# Patient Record
Sex: FEMALE | Race: WHITE | Employment: OTHER | URBAN - METROPOLITAN AREA
[De-identification: names, ages, dates, MRNs, and addresses within clinical notes are randomized per-mention and may not be internally consistent; named-entity substitution may affect disease eponyms.]

---

## 2017-03-08 ENCOUNTER — TELEPHONE (OUTPATIENT)
Dept: CARDIOLOGY CLINIC | Age: 62
End: 2017-03-08

## 2017-03-09 ENCOUNTER — OFFICE VISIT (OUTPATIENT)
Dept: INTERNAL MEDICINE CLINIC | Age: 62
End: 2017-03-09

## 2017-03-09 VITALS
DIASTOLIC BLOOD PRESSURE: 68 MMHG | WEIGHT: 215 LBS | RESPIRATION RATE: 15 BRPM | HEART RATE: 80 BPM | BODY MASS INDEX: 42.21 KG/M2 | OXYGEN SATURATION: 96 % | HEIGHT: 60 IN | SYSTOLIC BLOOD PRESSURE: 104 MMHG

## 2017-03-09 DIAGNOSIS — J44.1 ACUTE EXACERBATION OF CHRONIC OBSTRUCTIVE PULMONARY DISEASE (COPD) (HCC): Primary | Chronic | ICD-10-CM

## 2017-03-09 DIAGNOSIS — Z76.89 ENCOUNTER TO ESTABLISH CARE WITH NEW DOCTOR: ICD-10-CM

## 2017-03-09 DIAGNOSIS — I10 ESSENTIAL HYPERTENSION: ICD-10-CM

## 2017-03-09 DIAGNOSIS — B37.2 CANDIDAL INTERTRIGO: ICD-10-CM

## 2017-03-09 DIAGNOSIS — Z13.9 SCREENING: ICD-10-CM

## 2017-03-09 DIAGNOSIS — E78.00 PURE HYPERCHOLESTEROLEMIA: ICD-10-CM

## 2017-03-09 DIAGNOSIS — I25.10 CORONARY ARTERY DISEASE INVOLVING NATIVE CORONARY ARTERY OF NATIVE HEART WITHOUT ANGINA PECTORIS: ICD-10-CM

## 2017-03-09 DIAGNOSIS — E03.9 ACQUIRED HYPOTHYROIDISM: ICD-10-CM

## 2017-03-09 DIAGNOSIS — E11.8 TYPE 2 DIABETES MELLITUS WITH COMPLICATION, WITHOUT LONG-TERM CURRENT USE OF INSULIN (HCC): ICD-10-CM

## 2017-03-09 PROBLEM — E11.9 TYPE 2 DIABETES MELLITUS (HCC): Status: ACTIVE | Noted: 2017-03-09

## 2017-03-09 PROCEDURE — 3044F HG A1C LEVEL LT 7.0%: CPT | Performed by: NURSE PRACTITIONER

## 2017-03-09 PROCEDURE — 3017F COLORECTAL CA SCREEN DOC REV: CPT | Performed by: NURSE PRACTITIONER

## 2017-03-09 PROCEDURE — 1111F DSCHRG MED/CURRENT MED MERGE: CPT | Performed by: NURSE PRACTITIONER

## 2017-03-09 PROCEDURE — 3023F SPIROM DOC REV: CPT | Performed by: NURSE PRACTITIONER

## 2017-03-09 PROCEDURE — 1036F TOBACCO NON-USER: CPT | Performed by: NURSE PRACTITIONER

## 2017-03-09 PROCEDURE — G8926 SPIRO NO PERF OR DOC: HCPCS | Performed by: NURSE PRACTITIONER

## 2017-03-09 PROCEDURE — 3014F SCREEN MAMMO DOC REV: CPT | Performed by: NURSE PRACTITIONER

## 2017-03-09 PROCEDURE — G8417 CALC BMI ABV UP PARAM F/U: HCPCS | Performed by: NURSE PRACTITIONER

## 2017-03-09 PROCEDURE — G8427 DOCREV CUR MEDS BY ELIG CLIN: HCPCS | Performed by: NURSE PRACTITIONER

## 2017-03-09 PROCEDURE — 99205 OFFICE O/P NEW HI 60 MIN: CPT | Performed by: NURSE PRACTITIONER

## 2017-03-09 PROCEDURE — G8598 ASA/ANTIPLAT THER USED: HCPCS | Performed by: NURSE PRACTITIONER

## 2017-03-09 PROCEDURE — G8484 FLU IMMUNIZE NO ADMIN: HCPCS | Performed by: NURSE PRACTITIONER

## 2017-03-09 RX ORDER — CLOTRIMAZOLE 1 %
CREAM (GRAM) TOPICAL
Qty: 60 G | Refills: 0 | Status: SHIPPED | OUTPATIENT
Start: 2017-03-09 | End: 2017-08-28

## 2017-03-09 ASSESSMENT — ENCOUNTER SYMPTOMS
WHEEZING: 1
VOMITING: 0
DIARRHEA: 0
CHEST TIGHTNESS: 0
SHORTNESS OF BREATH: 0
CONSTIPATION: 1
COUGH: 0
BACK PAIN: 0
EYE DISCHARGE: 0
NAUSEA: 0
RHINORRHEA: 0

## 2017-03-13 ENCOUNTER — TELEPHONE (OUTPATIENT)
Dept: INTERNAL MEDICINE CLINIC | Age: 62
End: 2017-03-13

## 2017-03-17 ENCOUNTER — TELEPHONE (OUTPATIENT)
Dept: INTERNAL MEDICINE CLINIC | Age: 62
End: 2017-03-17

## 2017-03-17 DIAGNOSIS — E11.8 TYPE 2 DIABETES MELLITUS WITH COMPLICATION, WITHOUT LONG-TERM CURRENT USE OF INSULIN (HCC): Primary | ICD-10-CM

## 2017-03-17 RX ORDER — LORATADINE 10 MG/1
10 CAPSULE, LIQUID FILLED ORAL DAILY PRN
Qty: 30 CAPSULE | Refills: 0 | Status: SHIPPED | OUTPATIENT
Start: 2017-03-17 | End: 2017-04-10

## 2017-03-17 RX ORDER — AMITRIPTYLINE HYDROCHLORIDE 10 MG/1
10 TABLET, FILM COATED ORAL NIGHTLY
Qty: 30 TABLET | Refills: 0 | Status: SHIPPED | OUTPATIENT
Start: 2017-03-17 | End: 2017-04-10

## 2017-03-20 ENCOUNTER — TELEPHONE (OUTPATIENT)
Dept: INTERNAL MEDICINE CLINIC | Age: 62
End: 2017-03-20

## 2017-03-20 RX ORDER — GLUCOSAMINE HCL/CHONDROITIN SU 500-400 MG
CAPSULE ORAL
Qty: 35 STRIP | Refills: 3 | Status: SHIPPED | OUTPATIENT
Start: 2017-03-20 | End: 2017-04-10

## 2017-04-10 ENCOUNTER — OFFICE VISIT (OUTPATIENT)
Dept: INTERNAL MEDICINE CLINIC | Age: 62
End: 2017-04-10

## 2017-04-10 VITALS
HEART RATE: 86 BPM | DIASTOLIC BLOOD PRESSURE: 74 MMHG | BODY MASS INDEX: 43.59 KG/M2 | RESPIRATION RATE: 20 BRPM | SYSTOLIC BLOOD PRESSURE: 115 MMHG | HEIGHT: 60 IN | WEIGHT: 222 LBS

## 2017-04-10 DIAGNOSIS — J96.01 ACUTE RESPIRATORY FAILURE WITH HYPOXIA (HCC): ICD-10-CM

## 2017-04-10 DIAGNOSIS — Z87.891 H/O TOBACCO USE, PRESENTING HAZARDS TO HEALTH: ICD-10-CM

## 2017-04-10 DIAGNOSIS — M79.671 RIGHT FOOT PAIN: ICD-10-CM

## 2017-04-10 DIAGNOSIS — I10 ESSENTIAL HYPERTENSION: ICD-10-CM

## 2017-04-10 DIAGNOSIS — Z12.31 ENCOUNTER FOR SCREENING MAMMOGRAM FOR MALIGNANT NEOPLASM OF BREAST: ICD-10-CM

## 2017-04-10 DIAGNOSIS — E78.00 PURE HYPERCHOLESTEROLEMIA: ICD-10-CM

## 2017-04-10 DIAGNOSIS — Z90.710 H/O HYSTERECTOMY FOR BENIGN DISEASE: Primary | ICD-10-CM

## 2017-04-10 DIAGNOSIS — I25.9 ISCHEMIC HEART DISEASE DUE TO CORONARY ARTERY OBSTRUCTION (HCC): ICD-10-CM

## 2017-04-10 DIAGNOSIS — Z12.39 BREAST SCREENING: ICD-10-CM

## 2017-04-10 DIAGNOSIS — I24.0 ISCHEMIC HEART DISEASE DUE TO CORONARY ARTERY OBSTRUCTION (HCC): ICD-10-CM

## 2017-04-10 DIAGNOSIS — E11.8 TYPE 2 DIABETES MELLITUS WITH COMPLICATION, WITHOUT LONG-TERM CURRENT USE OF INSULIN (HCC): ICD-10-CM

## 2017-04-10 DIAGNOSIS — E03.9 ACQUIRED HYPOTHYROIDISM: ICD-10-CM

## 2017-04-10 DIAGNOSIS — J43.1 PANLOBULAR EMPHYSEMA (HCC): ICD-10-CM

## 2017-04-10 PROCEDURE — G8598 ASA/ANTIPLAT THER USED: HCPCS | Performed by: INTERNAL MEDICINE

## 2017-04-10 PROCEDURE — G8926 SPIRO NO PERF OR DOC: HCPCS | Performed by: INTERNAL MEDICINE

## 2017-04-10 PROCEDURE — G8417 CALC BMI ABV UP PARAM F/U: HCPCS | Performed by: INTERNAL MEDICINE

## 2017-04-10 PROCEDURE — 3044F HG A1C LEVEL LT 7.0%: CPT | Performed by: INTERNAL MEDICINE

## 2017-04-10 PROCEDURE — G8427 DOCREV CUR MEDS BY ELIG CLIN: HCPCS | Performed by: INTERNAL MEDICINE

## 2017-04-10 PROCEDURE — 3014F SCREEN MAMMO DOC REV: CPT | Performed by: INTERNAL MEDICINE

## 2017-04-10 PROCEDURE — 99215 OFFICE O/P EST HI 40 MIN: CPT | Performed by: INTERNAL MEDICINE

## 2017-04-10 PROCEDURE — 3017F COLORECTAL CA SCREEN DOC REV: CPT | Performed by: INTERNAL MEDICINE

## 2017-04-10 PROCEDURE — 1036F TOBACCO NON-USER: CPT | Performed by: INTERNAL MEDICINE

## 2017-04-10 PROCEDURE — 3023F SPIROM DOC REV: CPT | Performed by: INTERNAL MEDICINE

## 2017-06-14 ENCOUNTER — TELEPHONE (OUTPATIENT)
Dept: FAMILY MEDICINE CLINIC | Age: 62
End: 2017-06-14

## 2017-07-11 ENCOUNTER — TELEPHONE (OUTPATIENT)
Dept: CARDIOLOGY CLINIC | Age: 62
End: 2017-07-11

## 2017-07-11 RX ORDER — PRASUGREL 10 MG/1
10 TABLET, FILM COATED ORAL DAILY
Qty: 30 TABLET | Refills: 1 | Status: SHIPPED | OUTPATIENT
Start: 2017-07-11 | End: 2017-12-05 | Stop reason: SDUPTHER

## 2017-07-11 RX ORDER — CARVEDILOL 3.12 MG/1
3.12 TABLET ORAL 2 TIMES DAILY WITH MEALS
Qty: 60 TABLET | Refills: 1 | Status: SHIPPED | OUTPATIENT
Start: 2017-07-11 | End: 2017-12-05 | Stop reason: SDUPTHER

## 2017-07-11 RX ORDER — NITROGLYCERIN 0.4 MG/1
0.4 TABLET SUBLINGUAL EVERY 5 MIN PRN
Qty: 25 TABLET | Refills: 1 | Status: SHIPPED | OUTPATIENT
Start: 2017-07-11 | End: 2017-12-21 | Stop reason: SDUPTHER

## 2017-08-28 ENCOUNTER — OFFICE VISIT (OUTPATIENT)
Dept: INTERNAL MEDICINE CLINIC | Age: 62
End: 2017-08-28

## 2017-08-28 VITALS
SYSTOLIC BLOOD PRESSURE: 102 MMHG | BODY MASS INDEX: 43.98 KG/M2 | HEART RATE: 79 BPM | TEMPERATURE: 99.5 F | WEIGHT: 224 LBS | DIASTOLIC BLOOD PRESSURE: 69 MMHG | HEIGHT: 60 IN | RESPIRATION RATE: 15 BRPM | OXYGEN SATURATION: 94 %

## 2017-08-28 DIAGNOSIS — E03.9 ACQUIRED HYPOTHYROIDISM: ICD-10-CM

## 2017-08-28 DIAGNOSIS — I10 ESSENTIAL HYPERTENSION: ICD-10-CM

## 2017-08-28 DIAGNOSIS — J44.1 COPD EXACERBATION (HCC): ICD-10-CM

## 2017-08-28 DIAGNOSIS — Z12.31 ENCOUNTER FOR SCREENING MAMMOGRAM FOR MALIGNANT NEOPLASM OF BREAST: ICD-10-CM

## 2017-08-28 DIAGNOSIS — Z12.39 SCREENING BREAST EXAMINATION: ICD-10-CM

## 2017-08-28 DIAGNOSIS — J43.1 PANLOBULAR EMPHYSEMA (HCC): ICD-10-CM

## 2017-08-28 DIAGNOSIS — E11.8 TYPE 2 DIABETES MELLITUS WITH COMPLICATION, WITHOUT LONG-TERM CURRENT USE OF INSULIN (HCC): ICD-10-CM

## 2017-08-28 DIAGNOSIS — Z91.199 NONADHERENCE TO MEDICAL TREATMENT: ICD-10-CM

## 2017-08-28 DIAGNOSIS — Z12.4 SCREENING FOR CERVICAL CANCER: ICD-10-CM

## 2017-08-28 DIAGNOSIS — B96.89 BACTERIAL VAGINOSIS: Primary | ICD-10-CM

## 2017-08-28 DIAGNOSIS — N76.0 BACTERIAL VAGINOSIS: Primary | ICD-10-CM

## 2017-08-28 DIAGNOSIS — I25.9 ISCHEMIC HEART DISEASE DUE TO CORONARY ARTERY OBSTRUCTION (HCC): ICD-10-CM

## 2017-08-28 DIAGNOSIS — E78.00 PURE HYPERCHOLESTEROLEMIA: ICD-10-CM

## 2017-08-28 DIAGNOSIS — F33.1 MODERATE EPISODE OF RECURRENT MAJOR DEPRESSIVE DISORDER (HCC): ICD-10-CM

## 2017-08-28 DIAGNOSIS — I24.0 ISCHEMIC HEART DISEASE DUE TO CORONARY ARTERY OBSTRUCTION (HCC): ICD-10-CM

## 2017-08-28 LAB
CREATININE URINE POCT: 15
MICROALBUMIN/CREAT 24H UR: 5 MG/G{CREAT}
MICROALBUMIN/CREAT UR-RTO: NORMAL

## 2017-08-28 PROCEDURE — 3046F HEMOGLOBIN A1C LEVEL >9.0%: CPT | Performed by: INTERNAL MEDICINE

## 2017-08-28 PROCEDURE — G8427 DOCREV CUR MEDS BY ELIG CLIN: HCPCS | Performed by: INTERNAL MEDICINE

## 2017-08-28 PROCEDURE — 3014F SCREEN MAMMO DOC REV: CPT | Performed by: INTERNAL MEDICINE

## 2017-08-28 PROCEDURE — 3023F SPIROM DOC REV: CPT | Performed by: INTERNAL MEDICINE

## 2017-08-28 PROCEDURE — G8598 ASA/ANTIPLAT THER USED: HCPCS | Performed by: INTERNAL MEDICINE

## 2017-08-28 PROCEDURE — G8417 CALC BMI ABV UP PARAM F/U: HCPCS | Performed by: INTERNAL MEDICINE

## 2017-08-28 PROCEDURE — 1036F TOBACCO NON-USER: CPT | Performed by: INTERNAL MEDICINE

## 2017-08-28 PROCEDURE — 82044 UR ALBUMIN SEMIQUANTITATIVE: CPT | Performed by: INTERNAL MEDICINE

## 2017-08-28 PROCEDURE — G8926 SPIRO NO PERF OR DOC: HCPCS | Performed by: INTERNAL MEDICINE

## 2017-08-28 PROCEDURE — 99215 OFFICE O/P EST HI 40 MIN: CPT | Performed by: INTERNAL MEDICINE

## 2017-08-28 PROCEDURE — 3017F COLORECTAL CA SCREEN DOC REV: CPT | Performed by: INTERNAL MEDICINE

## 2017-08-28 RX ORDER — IBUPROFEN 800 MG/1
800 TABLET ORAL
COMMUNITY
Start: 2017-07-18 | End: 2017-08-28

## 2017-08-28 RX ORDER — CITALOPRAM 20 MG/1
20 TABLET ORAL DAILY
Qty: 30 TABLET | Refills: 11 | Status: SHIPPED | OUTPATIENT
Start: 2017-08-28 | End: 2017-12-21

## 2017-08-28 RX ORDER — LISINOPRIL 10 MG/1
10 TABLET ORAL NIGHTLY
Qty: 30 TABLET | Refills: 11 | Status: SHIPPED | OUTPATIENT
Start: 2017-08-28 | End: 2017-12-21 | Stop reason: SDUPTHER

## 2017-08-28 RX ORDER — ALBUTEROL SULFATE 90 UG/1
2 AEROSOL, METERED RESPIRATORY (INHALATION) EVERY 6 HOURS PRN
Qty: 1 INHALER | Refills: 11 | Status: SHIPPED | OUTPATIENT
Start: 2017-08-28 | End: 2017-12-21 | Stop reason: SDUPTHER

## 2017-08-28 RX ORDER — RANITIDINE 150 MG/1
TABLET ORAL
COMMUNITY
Start: 2017-02-02 | End: 2017-08-28

## 2017-08-28 RX ORDER — METRONIDAZOLE 500 MG/1
500 TABLET ORAL 3 TIMES DAILY
Qty: 30 TABLET | Refills: 0 | Status: SHIPPED | OUTPATIENT
Start: 2017-08-28 | End: 2017-09-07

## 2017-08-28 RX ORDER — FUROSEMIDE 40 MG/1
80 TABLET ORAL 2 TIMES DAILY
Qty: 60 TABLET | Refills: 3 | Status: SHIPPED | OUTPATIENT
Start: 2017-08-28 | End: 2017-12-21 | Stop reason: SDUPTHER

## 2017-08-28 RX ORDER — LEVOTHYROXINE SODIUM 0.05 MG/1
50 TABLET ORAL DAILY
Qty: 30 TABLET | Refills: 11 | Status: SHIPPED | OUTPATIENT
Start: 2017-08-28 | End: 2017-12-21 | Stop reason: SDUPTHER

## 2017-08-28 RX ORDER — POTASSIUM CHLORIDE 20 MEQ/1
20 TABLET, EXTENDED RELEASE ORAL 2 TIMES DAILY
Qty: 60 TABLET | Refills: 11 | Status: SHIPPED | OUTPATIENT
Start: 2017-08-28 | End: 2017-12-21 | Stop reason: SDUPTHER

## 2017-08-28 RX ORDER — GABAPENTIN 600 MG/1
600 TABLET ORAL
COMMUNITY
End: 2018-03-20

## 2017-08-28 ASSESSMENT — PATIENT HEALTH QUESTIONNAIRE - PHQ9
2. FEELING DOWN, DEPRESSED OR HOPELESS: 0
1. LITTLE INTEREST OR PLEASURE IN DOING THINGS: 0
SUM OF ALL RESPONSES TO PHQ QUESTIONS 1-9: 0
SUM OF ALL RESPONSES TO PHQ9 QUESTIONS 1 & 2: 0

## 2017-12-21 ENCOUNTER — OFFICE VISIT (OUTPATIENT)
Dept: INTERNAL MEDICINE CLINIC | Age: 62
End: 2017-12-21

## 2017-12-21 VITALS
HEART RATE: 69 BPM | OXYGEN SATURATION: 95 % | SYSTOLIC BLOOD PRESSURE: 122 MMHG | HEIGHT: 60 IN | DIASTOLIC BLOOD PRESSURE: 78 MMHG | WEIGHT: 210 LBS | BODY MASS INDEX: 41.23 KG/M2 | RESPIRATION RATE: 16 BRPM

## 2017-12-21 DIAGNOSIS — G47.33 OBSTRUCTIVE SLEEP APNEA SYNDROME: ICD-10-CM

## 2017-12-21 DIAGNOSIS — F33.1 MODERATE EPISODE OF RECURRENT MAJOR DEPRESSIVE DISORDER (HCC): ICD-10-CM

## 2017-12-21 DIAGNOSIS — R60.0 LOCALIZED EDEMA: ICD-10-CM

## 2017-12-21 DIAGNOSIS — E66.01 MORBID OBESITY WITH BMI OF 40.0-44.9, ADULT (HCC): ICD-10-CM

## 2017-12-21 DIAGNOSIS — I24.0 ISCHEMIC HEART DISEASE DUE TO CORONARY ARTERY OBSTRUCTION (HCC): ICD-10-CM

## 2017-12-21 DIAGNOSIS — E11.8 TYPE 2 DIABETES MELLITUS WITH COMPLICATION, WITHOUT LONG-TERM CURRENT USE OF INSULIN (HCC): ICD-10-CM

## 2017-12-21 DIAGNOSIS — I10 ESSENTIAL HYPERTENSION: ICD-10-CM

## 2017-12-21 DIAGNOSIS — E03.9 ACQUIRED HYPOTHYROIDISM: ICD-10-CM

## 2017-12-21 DIAGNOSIS — Z72.0 TOBACCO ABUSE: ICD-10-CM

## 2017-12-21 DIAGNOSIS — Z12.4 CERVICAL CANCER SCREENING: ICD-10-CM

## 2017-12-21 DIAGNOSIS — J44.1 COPD EXACERBATION (HCC): ICD-10-CM

## 2017-12-21 DIAGNOSIS — Z12.31 ENCOUNTER FOR SCREENING MAMMOGRAM FOR BREAST CANCER: Primary | ICD-10-CM

## 2017-12-21 DIAGNOSIS — I25.9 ISCHEMIC HEART DISEASE DUE TO CORONARY ARTERY OBSTRUCTION (HCC): ICD-10-CM

## 2017-12-21 DIAGNOSIS — J43.1 PANLOBULAR EMPHYSEMA (HCC): ICD-10-CM

## 2017-12-21 DIAGNOSIS — J01.90 ACUTE NON-RECURRENT SINUSITIS, UNSPECIFIED LOCATION: ICD-10-CM

## 2017-12-21 LAB
ALBUMIN SERPL-MCNC: 4.3 G/DL (ref 3.4–5)
ANION GAP SERPL CALCULATED.3IONS-SCNC: 12 MMOL/L (ref 3–16)
BUN BLDV-MCNC: 17 MG/DL (ref 7–20)
CALCIUM SERPL-MCNC: 9.8 MG/DL (ref 8.3–10.6)
CHLORIDE BLD-SCNC: 100 MMOL/L (ref 99–110)
CO2: 28 MMOL/L (ref 21–32)
CREAT SERPL-MCNC: 0.6 MG/DL (ref 0.6–1.2)
GFR AFRICAN AMERICAN: >60
GFR NON-AFRICAN AMERICAN: >60
GLUCOSE BLD-MCNC: 89 MG/DL (ref 70–99)
PHOSPHORUS: 3.6 MG/DL (ref 2.5–4.9)
POTASSIUM SERPL-SCNC: 4.1 MMOL/L (ref 3.5–5.1)
SODIUM BLD-SCNC: 140 MMOL/L (ref 136–145)

## 2017-12-21 PROCEDURE — 3017F COLORECTAL CA SCREEN DOC REV: CPT | Performed by: INTERNAL MEDICINE

## 2017-12-21 PROCEDURE — G8484 FLU IMMUNIZE NO ADMIN: HCPCS | Performed by: INTERNAL MEDICINE

## 2017-12-21 PROCEDURE — 1036F TOBACCO NON-USER: CPT | Performed by: INTERNAL MEDICINE

## 2017-12-21 PROCEDURE — 99215 OFFICE O/P EST HI 40 MIN: CPT | Performed by: INTERNAL MEDICINE

## 2017-12-21 PROCEDURE — 3023F SPIROM DOC REV: CPT | Performed by: INTERNAL MEDICINE

## 2017-12-21 PROCEDURE — G8417 CALC BMI ABV UP PARAM F/U: HCPCS | Performed by: INTERNAL MEDICINE

## 2017-12-21 PROCEDURE — 3014F SCREEN MAMMO DOC REV: CPT | Performed by: INTERNAL MEDICINE

## 2017-12-21 PROCEDURE — 3044F HG A1C LEVEL LT 7.0%: CPT | Performed by: INTERNAL MEDICINE

## 2017-12-21 PROCEDURE — G8427 DOCREV CUR MEDS BY ELIG CLIN: HCPCS | Performed by: INTERNAL MEDICINE

## 2017-12-21 PROCEDURE — G8598 ASA/ANTIPLAT THER USED: HCPCS | Performed by: INTERNAL MEDICINE

## 2017-12-21 PROCEDURE — G8926 SPIRO NO PERF OR DOC: HCPCS | Performed by: INTERNAL MEDICINE

## 2017-12-21 RX ORDER — AZITHROMYCIN 250 MG/1
TABLET, FILM COATED ORAL
Qty: 1 PACKET | Refills: 0 | Status: SHIPPED | OUTPATIENT
Start: 2017-12-21 | End: 2017-12-28

## 2017-12-21 RX ORDER — FUROSEMIDE 40 MG/1
40 TABLET ORAL 2 TIMES DAILY
Qty: 180 TABLET | Refills: 3 | Status: SHIPPED | OUTPATIENT
Start: 2017-12-21 | End: 2018-01-05 | Stop reason: SDUPTHER

## 2017-12-21 RX ORDER — NITROGLYCERIN 0.4 MG/1
0.4 TABLET SUBLINGUAL EVERY 5 MIN PRN
Qty: 25 TABLET | Refills: 1 | Status: SHIPPED | OUTPATIENT
Start: 2017-12-21 | End: 2018-07-24 | Stop reason: SDUPTHER

## 2017-12-21 RX ORDER — POTASSIUM CHLORIDE 20 MEQ/1
20 TABLET, EXTENDED RELEASE ORAL 2 TIMES DAILY
Qty: 90 TABLET | Refills: 3 | Status: SHIPPED | OUTPATIENT
Start: 2017-12-21 | End: 2018-01-05 | Stop reason: SDUPTHER

## 2017-12-21 RX ORDER — ATORVASTATIN CALCIUM 80 MG/1
80 TABLET, FILM COATED ORAL DAILY
Qty: 90 TABLET | Refills: 3 | Status: SHIPPED | OUTPATIENT
Start: 2017-12-21 | End: 2018-01-05 | Stop reason: SDUPTHER

## 2017-12-21 RX ORDER — CITALOPRAM 20 MG/1
20 TABLET ORAL DAILY
Qty: 30 TABLET | Refills: 11 | Status: SHIPPED | OUTPATIENT
Start: 2017-12-21 | End: 2018-03-21 | Stop reason: SDUPTHER

## 2017-12-21 RX ORDER — ESCITALOPRAM OXALATE 20 MG/1
20 TABLET ORAL DAILY
Qty: 30 TABLET | Refills: 3 | Status: SHIPPED | OUTPATIENT
Start: 2017-12-21 | End: 2018-03-20

## 2017-12-21 RX ORDER — LEVOTHYROXINE SODIUM 0.05 MG/1
50 TABLET ORAL DAILY
Qty: 30 TABLET | Refills: 11 | Status: SHIPPED | OUTPATIENT
Start: 2017-12-21 | End: 2018-03-21 | Stop reason: SDUPTHER

## 2017-12-21 RX ORDER — ALBUTEROL SULFATE 90 UG/1
2 AEROSOL, METERED RESPIRATORY (INHALATION) EVERY 6 HOURS PRN
Qty: 1 INHALER | Refills: 11 | Status: SHIPPED | OUTPATIENT
Start: 2017-12-21 | End: 2018-07-05 | Stop reason: SDUPTHER

## 2017-12-21 RX ORDER — LISINOPRIL 10 MG/1
10 TABLET ORAL NIGHTLY
Qty: 30 TABLET | Refills: 11 | Status: SHIPPED | OUTPATIENT
Start: 2017-12-21 | End: 2018-01-05 | Stop reason: SDUPTHER

## 2017-12-21 NOTE — PATIENT INSTRUCTIONS
Patient Education        Learning About Chronic Bronchitis  What is chronic bronchitis? Chronic bronchitis is long-term swelling and the buildup of mucus in the airways of your lungs. The airways (bronchial tubes) get inflamed and make a lot of mucus. This can narrow or block the airways, making it hard for you to breathe. It is a form of COPD (chronic obstructive pulmonary disease). Chronic bronchitis is usually caused by smoking. But chemical fumes, dust, or air pollution also can cause it over time. What can you expect when you have chronic bronchitis? Chronic bronchitis gets worse over time. You cannot undo the damage to your lungs. Over time, you may find that:  · You get short of breath even when you do simple things like get dressed or fix a meal.  · It is hard to eat or exercise. · You lose weight and feel weaker. Over many years, the swelling and mucus from chronic bronchitis make it more likely that you will get lung infections. But there are things you can do to prevent more damage and feel better. What are the symptoms? The main symptoms of chronic bronchitis are:  · A cough that will not go away. · Mucus that comes up when you cough. · Shortness of breath that gets worse when you exercise. At times, your symptoms may suddenly flare up and get much worse. This is a called an exacerbation (say \"egg-GLENN-er-BAY-ambrosio\"). When this happens, your usual symptoms quickly get worse and stay bad. This can be dangerous. You may have to go to the hospital.  How can you keep chronic bronchitis from getting worse? Don't smoke. That is the best way to keep chronic bronchitis from getting worse. If you already smoke, it is never too late to stop. If you need help quitting, talk to your doctor about stop-smoking programs and medicines. These can increase your chances of quitting for good. You can do other things to keep chronic bronchitis from getting worse:  · Avoid bad air.  Air pollution, chemical fumes, and dust also can make chronic bronchitis worse. · Get a flu shot every year. A shot may keep the flu from turning into something more serious, like pneumonia. A flu shot also may lower your chances of having a flare-up. · Get a pneumococcal shot. A shot can prevent some of the serious complications of pneumonia. Ask your doctor how often you should get this shot. How is chronic bronchitis treated? Chronic bronchitis is treated with medicines and oxygen. You also can take steps at home to stay healthy and keep your condition from getting worse. Medicines and oxygen therapy  · You may be taking medicines such as:  ¨ Bronchodilators. These help open your airways and make breathing easier. Bronchodilators are either short-acting (work for 6 to 9 hours) or long-acting (work for 24 hours). You inhale most bronchodilators, so they start to act quickly. Always carry your quick-relief inhaler with you in case you need it while you are away from home. ¨ Corticosteroids. These reduce airway inflammation. They come in pill or inhaled form. You must take these medicines every day for them to work well. ¨ Antibiotics. These medicines are used when you have a bacterial lung infection. · Take your medicines exactly as prescribed. Call your doctor if you think you are having a problem with your medicine. · Oxygen therapy boosts the amount of oxygen in your blood and helps you breathe easier. Use the flow rate your doctor has recommended, and do not change it without talking to your doctor first.  Other care at home  · If your doctor recommends it, get more exercise. Walking is a good choice. Bit by bit, increase the amount you walk every day. Try for at least 30 minutes on most days of the week. · Learn breathing methods-such as breathing through pursed lips-to help you become less short of breath.   · If your doctor has not set you up with a pulmonary rehabilitation program, talk to him or her about whether rehab plan that best meets your needs. You may want to attend a smoking cessation program to help you quit smoking. When you choose a program, look for one that has proven success. Ask your doctor for ideas. You will greatly increase your chances of success if you take medicine as well as get counseling or join a cessation program.  Some of the changes you feel when you first quit tobacco are uncomfortable. Your body will miss the nicotine at first, and you may feel short-tempered and grumpy. You may have trouble sleeping or concentrating. Medicine can help you deal with these symptoms. You may struggle with changing your smoking habits and rituals. The last step is the tricky one: Be prepared for the smoking urge to continue for a time. This is a lot to deal with, but keep at it. You will feel better. Follow-up care is a key part of your treatment and safety. Be sure to make and go to all appointments, and call your doctor if you are having problems. It's also a good idea to know your test results and keep a list of the medicines you take. How can you care for yourself at home? · Ask your family, friends, and coworkers for support. You have a better chance of quitting if you have help and support. · Join a support group, such as Nicotine Anonymous, for people who are trying to quit smoking. · Consider signing up for a smoking cessation program, such as the American [employer] Association's Freedom from Smoking program.  · Set a quit date. Pick your date carefully so that it is not right in the middle of a big deadline or stressful time. Once you quit, do not even take a puff. Get rid of all ashtrays and lighters after your last cigarette. Clean your house and your clothes so that they do not smell of smoke. · Learn how to be a nonsmoker. Think about ways you can avoid those things that make you reach for a cigarette. ¨ Avoid situations that put you at greatest risk for smoking.  For some people, it is hard to have a

## 2017-12-21 NOTE — PROGRESS NOTES
CONSTITUTIONAL: [x] All Symptoms Negative  [] Fever     [] Chills     [] Weight Loss  [] Fatigue     [] Sweating     [] Weakness  Comments:     EYE: [x] All Symptoms Negative  [] Blurred Vision     [] Double Vision     [] Light Sensitivity  [] Eye Pain     [] Eye Discharge     [] Eye Redness  Comments:     GASTROINTESTINAL: [x] All Symptoms Negative  [] Heart Burn     [] Nausea     [] Vomiting  [] Abdominal Pain     Diarrhea     [] Constipation  [] Blood in Stool     [] Black Tarry Stool  Comments:     ENDO: [x] All Symptoms Negative  [] Easy Bruising     [] Increased Thirst  Comments:     SKIN: [x] All Symptoms Negative  [] Rash     [] Itching  Comments:     NEUROLOGICAL: [x] All Symptoms Negative  [] Dizziness     [] Tingling     [] Tremor  [] Sensory Change     [] Speech Change     [] Focal Weakness  [] Seizures     [] LOC  Comments:     CARDIOVASCULAR: [x] All Symptoms Negative  [] Chest Pain     [] Palpitations     [] Orthopnea  [] Cramps When Walking     [] Leg Swelling  Comments:     URINARY: [x] All Symptoms Negative  [] Pain/Burning     [] Urgency     [] Frequency  [] Blood in Urine     [] Flank Pain  Comments:     PSYCHIATRIC: [x] All Symptoms Negative  [] Nervous/Anxious     [] Suicidal Ideas     [] Substance Use/Abuse  [] Hallucinations     [] Nervous/Anxious     [] Insomnia  [] Memory Loss  Comments:     HENT: [x] All Symptoms Negative  [] Headache     [] Hearing Loss     [] Ringing     [] Ear Pain  [] Ear Discharge     [] Nose Bleed     [] Congestion  [] Stridor     [] Sore Throat  Comments:     RESPIRATORY: [x] All Symptoms Negative  [] Cough     [] Bloody Mucous     [] Sputum Production  [] Shortness of Breath     [] Wheezing  Comments:     MUSKOSKELETAL: [x] All Symptoms Negative  [] Muscle Aches     [] Neck Pain     [] Back Pain  [] Joint Pain     [] Falls  Comments:

## 2017-12-21 NOTE — PROGRESS NOTES
Chief Complaint   Patient presents with    6 Month Follow-Up     htn,DM, hld-med refill-order for eye  and Jacey Freed    Back Pain     upper back-when coughing       HPI: Here for management of multiple chronic conditions as per the active problems list below, which I reviewed and updated with the patient today. States doing well with no new concerns except if noted below. I have reviewed the chart notes available from myself and other providers. I have addressed all active problems listed below, and created or updated the problems list as needed. C/o nonprod cough x months that hurts upper chest and upper back    Doesn't feel like depression medication is working well any more. Patient Active Problem List   Diagnosis    Tobacco abuse    Type 2 diabetes mellitus ([address]    Essential hypertension    Pure hypercholesterolemia    Acquired hypothyroidism    Ischemic heart disease due to coronary artery obstruction (HCC)    Panlobular emphysema (HCC)    Moderate episode of recurrent major depressive disorder ([address]    Obstructive sleep apnea syndrome       Panlobular emphysema ([address]  Despite current URI, no change in breathing. Using inhalers and oxygen mostly at bedtime.      Type 2 diabetes mellitus ([address]  States adherent    Acquired hypothyroidism  asx      Discussed all labs, other tests, and imaging if available   Lab Results   Component Value Date    WBC 20.0 (H) 03/05/2017    HGB 13.2 03/05/2017    HCT 41.8 03/05/2017    MCV 83.8 03/05/2017     03/05/2017    LYMPHOPCT 7.1 03/05/2017    RBC 4.99 03/05/2017    MCH 26.5 03/05/2017    MCHC 31.7 03/05/2017    RDW 14.1 03/05/2017     Lab Results   Component Value Date     12/21/2017    K 4.1 12/21/2017     12/21/2017    CO2 28 12/21/2017    BUN 17 12/21/2017    CREATININE 0.6 12/21/2017    GLUCOSE 89 12/21/2017    CALCIUM 9.8 12/21/2017    LABALBU 4.3 12/21/2017    LABGLOM >60 12/21/2017    GFRAA >60 12/21/2017     Lab Results Day 1, and take 1 tab (250 mg) on days 2 through 5. 12/21/17 12/31/17 Yes Colby Mcdaniel MD   prasugrel (EFFIENT) 10 MG TABS TAKE 1 TABLET BY MOUTH DAILY 12/7/17  Yes Kaylee Cason MD   carvedilol (COREG) 3.125 MG tablet TAKE 1 TABLET BY MOUTH TWICE A DAY WITH MEALS 12/7/17  Yes Kaylee Cason MD   gabapentin (NEURONTIN) 600 MG tablet Take 600 mg by mouth   Yes Historical Provider, MD   aspirin 81 MG chewable tablet Take 1 tablet by mouth daily 3/7/17   MD BUCK Flood  CONSTITUTIONAL: [x] All Symptoms Negative  [] Fever     [] Chills     [] Weight Loss  [] Fatigue     [] Sweating     [] Weakness  Comments:     EYE: [x] All Symptoms Negative  [] Blurred Vision     [] Double Vision     [] Light Sensitivity  [] Eye Pain     [] Eye Discharge     [] Eye Redness  Comments:     GASTROINTESTINAL: [x] All Symptoms Negative  [] Heart Burn     [] Nausea     [] Vomiting  [] Abdominal Pain     Diarrhea     [] Constipation  [] Blood in Stool     [] Black Tarry Stool  Comments:     ENDO: [x] All Symptoms Negative  [] Easy Bruising     [] Increased Thirst  Comments:     SKIN: [x] All Symptoms Negative  [] Rash     [] Itching  Comments:     NEUROLOGICAL: [x] All Symptoms Negative  [] Dizziness     [] Tingling     [] Tremor  [] Sensory Change     [] Speech Change     [] Focal Weakness  [] Seizures     [] LOC  Comments:     CARDIOVASCULAR: [x] All Symptoms Negative  [] Chest Pain     [] Palpitations     [] Orthopnea  [] Cramps When Walking     [] Leg Swelling  Comments:     URINARY: [x] All Symptoms Negative  [] Pain/Burning     [] Urgency     [] Frequency  [] Blood in Urine     [] Flank Pain  Comments:     PSYCHIATRIC: [x] All Symptoms Negative  [] Nervous/Anxious     [] Suicidal Ideas     [] Substance Use/Abuse  [] Hallucinations     [] Nervous/Anxious     [] Insomnia  [] Memory Loss  Comments:     HENT: [x] All Symptoms Negative  [] Headache     [] Hearing Loss     [] Ringing     [] Ear Pain  [] Ear Discharge     [] Nose m)   Wt 210 lb (95.3 kg)   SpO2 95%   Breastfeeding? No   BMI 41.01 kg/m²   Body mass index is 41.01 kg/m². Physical Exam   Constitutional: She is oriented to person, place, and time and well-developed, well-nourished, and in no distress. No distress. HENT:   Head: Normocephalic and atraumatic. Nose: Nose normal.   Mouth/Throat: No oropharyngeal exudate. Eyes: Conjunctivae and EOM are normal. Pupils are equal, round, and reactive to light. Right eye exhibits no discharge. Left eye exhibits no discharge. No scleral icterus. Neck: Neck supple. No JVD present. No tracheal deviation present. No thyromegaly present. Cardiovascular: Normal rate, regular rhythm, normal heart sounds and intact distal pulses. Exam reveals no gallop and no friction rub. No murmur heard. Pulmonary/Chest: Effort normal. No respiratory distress. She has wheezes. She has no rales. She exhibits no tenderness. Abdominal: Soft. Bowel sounds are normal. She exhibits no distension and no mass. There is no tenderness. There is no rebound and no guarding. Musculoskeletal: Normal range of motion. She exhibits no edema or tenderness. Lymphadenopathy:     She has no cervical adenopathy. Neurological: She is alert and oriented to person, place, and time. She has normal reflexes. No cranial nerve deficit. She exhibits normal muscle tone. Gait normal. Coordination normal. GCS score is 15. Skin: Skin is warm and dry. No rash noted. No erythema. No pallor.    Psychiatric: Mood, memory, affect and judgment normal.       ASSESSMENT AND PLANS:      Except as noted below, all chronic problems have been reviewed and are stable to continue medications or other therapy as previously documented in the patient's chart, with changes per orders or documentation below:        Assessment and Plan: Patient received counseling and, if relevant, printed instructions for all symptoms listed in CC and HPI, as well as for all diagnoses brought onto today's visit note below. Typical counseling includes, but is not limited to, non pharmacologic measures to manage listed symptoms and conditions; appropriate use, risks and benefits for all prescribed medications; potential interactions between medications both prescribed and OTC; diet; exercise; healthy behaviors; and goal setting to improve health. Pt.or responsible party was involved in shared decision making and had opportunity to have all questions answered. 1. COPD exacerbation (HCC)  - albuterol sulfate HFA (PROAIR HFA) 108 (90 Base) MCG/ACT inhaler; Inhale 2 puffs into the lungs every 6 hours as needed for Wheezing  Dispense: 1 Inhaler; Refill: 11  - mometasone-formoterol (DULERA) 200-5 MCG/ACT inhaler; Inhale 2 puffs into the lungs every 12 hours  Dispense: 1 Inhaler; Refill: 11  - azithromycin (ZITHROMAX) 250 MG tablet; Take 2 tabs (500 mg) on Day 1, and take 1 tab (250 mg) on days 2 through 5. Dispense: 1 packet; Refill: 0    2. Type 2 diabetes mellitus with complication, without long-term current use of insulin ([facility])  - lisinopril (PRINIVIL;ZESTRIL) 10 MG tablet; Take 1 tablet by mouth nightly  Dispense: 30 tablet; Refill: 11  - metFORMIN (GLUCOPHAGE) 1000 MG tablet; Take 1 tablet by mouth 2 times daily (with meals)  Dispense: 180 tablet; Refill: 3  - RENAL FUNCTION PANEL; Future    3. Essential hypertension  - lisinopril (PRINIVIL;ZESTRIL) 10 MG tablet; Take 1 tablet by mouth nightly  Dispense: 30 tablet; Refill: 11    4. Moderate episode of recurrent major depressive disorder (HCC)replace citalopram with escitalopram  -- escitalopram (LEXAPRO) 20 MG tablet; Take 1 tablet by mouth daily  Dispense: 30 tablet; Refill: 3    5. Ischemic heart disease due to coronary artery obstruction ([facility])  - levothyroxine (SYNTHROID) 50 MCG tablet; Take 1 tablet by mouth Daily  Dispense: 30 tablet; Refill: 11  - furosemide (LASIX) 40 MG tablet; Take 1 tablet by mouth 2 times daily  Dispense: 180 tablet;  Refill: asx         Relevant Medications    levothyroxine (SYNTHROID) 50 MCG tablet    Panlobular emphysema ([address]     Despite current URI, no change in breathing. Using inhalers and oxygen mostly at bedtime. Relevant Medications    methylPREDNISolone sodium (SOLU-MEDROL) injection 125 mg (Completed)    ipratropium-albuterol (DUONEB) nebulizer solution 1 ampule (Completed)    ipratropium-albuterol (DUONEB) nebulizer solution 1 ampule (Completed)    albuterol sulfate HFA (PROAIR HFA) 108 (90 Base) MCG/ACT inhaler    mometasone-formoterol (DULERA) 200-5 MCG/ACT inhaler    Moderate episode of recurrent major depressive disorder (HCC)    Relevant Medications    citalopram (CELEXA) 20 MG tablet    escitalopram (LEXAPRO) 20 MG tablet    Obstructive sleep apnea syndrome    Relevant Orders    [facility]        Orders Placed This Encounter   Procedures    MARIMAR Digital Screen Bilateral [[id number]]     Standing Status:   Future     Standing Expiration Date:   12/21/2018    RENAL FUNCTION PANEL     Standing Status:   Future     Number of Occurrences:   1     Standing Expiration Date:   12/21/2018   2708  Sid Miranda     Referral Priority:   Routine     Referral Type:   Consult for Advice and Opinion     Referral Reason:   Specialty Services Required     Requested Specialty:   Pulmonology     Number of Visits Requested:   1   Emmie Duran MD     Referral Priority:   Routine     Referral Type:   Consult for Advice and Opinion     Referral Reason:   Specialty Services Required     Referred to Provider:   Vic Krueger MD     Requested Specialty:   Gynecology     Number of Visits Requested:   1       I have reconciled the medications in chart with what patient reports to be taking, and reviewed action/ side effects and how to take any new medications. Patient/caregiver understands purpose and side effects.   A complete  list of medications was provided in their after-visit

## 2018-01-05 ENCOUNTER — OFFICE VISIT (OUTPATIENT)
Dept: CARDIOLOGY CLINIC | Age: 63
End: 2018-01-05

## 2018-01-05 VITALS
WEIGHT: 209.4 LBS | HEART RATE: 69 BPM | DIASTOLIC BLOOD PRESSURE: 70 MMHG | SYSTOLIC BLOOD PRESSURE: 110 MMHG | BODY MASS INDEX: 42.21 KG/M2 | OXYGEN SATURATION: 96 % | HEIGHT: 59 IN

## 2018-01-05 DIAGNOSIS — I10 ESSENTIAL HYPERTENSION: ICD-10-CM

## 2018-01-05 DIAGNOSIS — I25.10 CORONARY ARTERY DISEASE INVOLVING NATIVE CORONARY ARTERY OF NATIVE HEART WITHOUT ANGINA PECTORIS: Primary | ICD-10-CM

## 2018-01-05 DIAGNOSIS — Z72.0 TOBACCO ABUSE: ICD-10-CM

## 2018-01-05 DIAGNOSIS — E78.5 HYPERLIPIDEMIA LDL GOAL <70: ICD-10-CM

## 2018-01-05 DIAGNOSIS — G47.33 OBSTRUCTIVE SLEEP APNEA SYNDROME: ICD-10-CM

## 2018-01-05 PROCEDURE — 99214 OFFICE O/P EST MOD 30 MIN: CPT | Performed by: NURSE PRACTITIONER

## 2018-01-05 PROCEDURE — G8417 CALC BMI ABV UP PARAM F/U: HCPCS | Performed by: NURSE PRACTITIONER

## 2018-01-05 PROCEDURE — G8598 ASA/ANTIPLAT THER USED: HCPCS | Performed by: NURSE PRACTITIONER

## 2018-01-05 PROCEDURE — 4004F PT TOBACCO SCREEN RCVD TLK: CPT | Performed by: NURSE PRACTITIONER

## 2018-01-05 PROCEDURE — 3017F COLORECTAL CA SCREEN DOC REV: CPT | Performed by: NURSE PRACTITIONER

## 2018-01-05 PROCEDURE — G8427 DOCREV CUR MEDS BY ELIG CLIN: HCPCS | Performed by: NURSE PRACTITIONER

## 2018-01-05 PROCEDURE — 3014F SCREEN MAMMO DOC REV: CPT | Performed by: NURSE PRACTITIONER

## 2018-01-05 PROCEDURE — G8484 FLU IMMUNIZE NO ADMIN: HCPCS | Performed by: NURSE PRACTITIONER

## 2018-01-05 RX ORDER — PRASUGREL 10 MG/1
10 TABLET, FILM COATED ORAL DAILY
Qty: 30 TABLET | Refills: 5 | Status: SHIPPED | OUTPATIENT
Start: 2018-01-05 | End: 2018-03-21 | Stop reason: SDUPTHER

## 2018-01-05 RX ORDER — POTASSIUM CHLORIDE 20 MEQ/1
20 TABLET, EXTENDED RELEASE ORAL 2 TIMES DAILY
Qty: 180 TABLET | Refills: 2 | Status: SHIPPED | OUTPATIENT
Start: 2018-01-05 | End: 2019-09-18

## 2018-01-05 RX ORDER — LISINOPRIL 10 MG/1
10 TABLET ORAL NIGHTLY
Qty: 90 TABLET | Refills: 2 | Status: SHIPPED | OUTPATIENT
Start: 2018-01-05 | End: 2018-03-21 | Stop reason: SDUPTHER

## 2018-01-05 RX ORDER — FUROSEMIDE 40 MG/1
40 TABLET ORAL 2 TIMES DAILY
Qty: 180 TABLET | Refills: 2 | Status: SHIPPED | OUTPATIENT
Start: 2018-01-05 | End: 2018-03-21 | Stop reason: SDUPTHER

## 2018-01-05 RX ORDER — ATORVASTATIN CALCIUM 80 MG/1
80 TABLET, FILM COATED ORAL DAILY
Qty: 90 TABLET | Refills: 2 | Status: SHIPPED | OUTPATIENT
Start: 2018-01-05 | End: 2018-03-21 | Stop reason: SDUPTHER

## 2018-01-05 RX ORDER — ASPIRIN 81 MG/1
81 TABLET, CHEWABLE ORAL DAILY
Qty: 90 TABLET | Refills: 2 | Status: SHIPPED | OUTPATIENT
Start: 2018-01-05 | End: 2018-07-24 | Stop reason: SDUPTHER

## 2018-01-05 RX ORDER — CARVEDILOL 3.12 MG/1
3.12 TABLET ORAL 2 TIMES DAILY WITH MEALS
Qty: 180 TABLET | Refills: 2 | Status: SHIPPED | OUTPATIENT
Start: 2018-01-05 | End: 2018-03-21 | Stop reason: SDUPTHER

## 2018-01-05 NOTE — PROGRESS NOTES
[facility]  Office Visit    Meli Thomas  1955 January 5, 2018    CC:   Chief Complaint   Patient presents with    Follow-Up from [facility]     12/28/17 for nausea and congestion, h/o CHF AND 2 STENTS    Shortness of Breath     pt has COPD, on O2 at 3L     Other     deniee CP,palpitations,dizziness,fatigue and edema     HPI:  The patient is 58 y.o. female with a past medical history significant for CAD/prior stenting, HTN, DM, obesity, KATELYN and COPD with continued tobacco use who is here for follow up. Last seen in hospital in 3/2017 with exacerbation of her COPD. Seen in ED in late 12/2017 with exacerbation of her COPD and placed on abx and discharged. Overall doing okay from cardiac standpoint. Since seen in ED in late Dec 2017 and placed on abx feeling much better. Continues to smoke, trying to cut back. Has chronic SOB with stairs and uses O2 at home at night. She is going to be scheduling sleep study (had previously been on CPAP). Cough productive white phlegm (chronic). Chronically sleeps with HOB elevated. Denies chest pain/discomfort, PND,  palpitations, further dizziness, syncope, edema ,weight change or claudication. No regular exercise but active around the house. Review of Systems:  Constitutional: Denies  fatigue, weakness, night sweats or fever. HEENT: Denies new visual changes, ringing in ears, nosebleeds, nasal congestion  Respiratory: Denies new or change in SOB, PND, orthopnea or cough. Cardiovascular: see HPI  GI: Denies N/V, diarrhea, constipation, abdominal pain, change in bowel habits, melena or hematochezia  : Denies urinary frequency, urgency, incontinence, hematuria or dysuria. Skin: Denies rash, hives, or cyanosis  Musculoskeletal: Denies joint or muscle aches/pain  Neurological: Denies syncope or TIA-like symptoms.   Psychiatric: Denies anxiety, insomnia or depression     Past Medical History:   Diagnosis Date    Asthma     CAD in the past    4. Essential hypertension  -controlled  -continue medical management    5. Hyperlipidemia LDL goal <70  -on statin  -LDL 98 on last lipids from 3/2017    6. Diabetes Mellitus    7. Morbid obesity    8. COPD  -uses O2 at home at night      Plan:  Continue ASA, Effient, statin, carvedilol, lasix, lisinopril, potassium  Discussed low fat/low sodium diet, weight loss and reinforced regular aerobic exercise. Labs from 12/2017 and 3/2017 reviewed  Check echo in interest of LV function  Emphasized smoking cessation and discussed strategies for smoking cessation (> 5 minutes of counseling given)  Follow up with Dr. Maninder Rodriguez in 4-6 months or sooner if needed    Return in about 4 months (around 5/5/2018) for 4-6 months with Dr. Maninder Rodriguez. Thanks for allowing me to participate in the care of this patient.       Bart Ramirez, [address], [address], [address] [place] [address]  Office: [phone]  Fax: [phone]

## 2018-01-08 RX ORDER — BUDESONIDE AND FORMOTEROL FUMARATE DIHYDRATE 80; 4.5 UG/1; UG/1
2 AEROSOL RESPIRATORY (INHALATION) 2 TIMES DAILY
Qty: 1 INHALER | Refills: 11 | Status: SHIPPED | OUTPATIENT
Start: 2018-01-08 | End: 2019-02-01 | Stop reason: SDUPTHER

## 2018-01-24 ENCOUNTER — TELEPHONE (OUTPATIENT)
Dept: CARDIOLOGY CLINIC | Age: 63
End: 2018-01-24

## 2018-01-24 NOTE — TELEPHONE ENCOUNTER
Javi from [employer] called just wanting to let Lenora Alvarado, [facility] know that Zarina William missed her appt for her Echo on 1/16/18. Javi can not reschedule that because that Zarina William does not have a working phone.

## 2018-03-20 ENCOUNTER — OFFICE VISIT (OUTPATIENT)
Dept: INTERNAL MEDICINE CLINIC | Age: 63
End: 2018-03-20

## 2018-03-20 VITALS
BODY MASS INDEX: 44.19 KG/M2 | SYSTOLIC BLOOD PRESSURE: 114 MMHG | WEIGHT: 204.8 LBS | HEART RATE: 90 BPM | DIASTOLIC BLOOD PRESSURE: 80 MMHG | OXYGEN SATURATION: 95 % | RESPIRATION RATE: 16 BRPM | HEIGHT: 57 IN

## 2018-03-20 DIAGNOSIS — Z63.8 STRESS DUE TO FAMILY TENSION: ICD-10-CM

## 2018-03-20 DIAGNOSIS — Z23 NEED FOR PROPHYLACTIC VACCINATION AND INOCULATION AGAINST VARICELLA: ICD-10-CM

## 2018-03-20 DIAGNOSIS — Z23 NEED FOR PROPHYLACTIC VACCINATION AGAINST DIPHTHERIA-TETANUS-PERTUSSIS (DTP): ICD-10-CM

## 2018-03-20 DIAGNOSIS — E11.8 TYPE 2 DIABETES MELLITUS WITH COMPLICATION, WITHOUT LONG-TERM CURRENT USE OF INSULIN (HCC): Primary | ICD-10-CM

## 2018-03-20 DIAGNOSIS — Z90.710 H/O TOTAL HYSTERECTOMY: ICD-10-CM

## 2018-03-20 DIAGNOSIS — E03.9 ACQUIRED HYPOTHYROIDISM: ICD-10-CM

## 2018-03-20 DIAGNOSIS — I24.0 ISCHEMIC HEART DISEASE DUE TO CORONARY ARTERY OBSTRUCTION (HCC): ICD-10-CM

## 2018-03-20 DIAGNOSIS — Z87.891 PERSONAL HISTORY OF TOBACCO USE, PRESENTING HAZARDS TO HEALTH: ICD-10-CM

## 2018-03-20 DIAGNOSIS — I10 ESSENTIAL HYPERTENSION: ICD-10-CM

## 2018-03-20 DIAGNOSIS — Z12.31 ENCOUNTER FOR SCREENING MAMMOGRAM FOR BREAST CANCER: ICD-10-CM

## 2018-03-20 DIAGNOSIS — F33.1 MODERATE EPISODE OF RECURRENT MAJOR DEPRESSIVE DISORDER (HCC): ICD-10-CM

## 2018-03-20 DIAGNOSIS — Z13.220 SCREENING FOR HYPERLIPIDEMIA: ICD-10-CM

## 2018-03-20 DIAGNOSIS — I25.9 ISCHEMIC HEART DISEASE DUE TO CORONARY ARTERY OBSTRUCTION (HCC): ICD-10-CM

## 2018-03-20 LAB — HBA1C MFR BLD: 5.9 %

## 2018-03-20 PROCEDURE — G8427 DOCREV CUR MEDS BY ELIG CLIN: HCPCS | Performed by: INTERNAL MEDICINE

## 2018-03-20 PROCEDURE — 99406 BEHAV CHNG SMOKING 3-10 MIN: CPT | Performed by: INTERNAL MEDICINE

## 2018-03-20 PROCEDURE — G8484 FLU IMMUNIZE NO ADMIN: HCPCS | Performed by: INTERNAL MEDICINE

## 2018-03-20 PROCEDURE — 4004F PT TOBACCO SCREEN RCVD TLK: CPT | Performed by: INTERNAL MEDICINE

## 2018-03-20 PROCEDURE — 83036 HEMOGLOBIN GLYCOSYLATED A1C: CPT | Performed by: INTERNAL MEDICINE

## 2018-03-20 PROCEDURE — G8598 ASA/ANTIPLAT THER USED: HCPCS | Performed by: INTERNAL MEDICINE

## 2018-03-20 PROCEDURE — 3017F COLORECTAL CA SCREEN DOC REV: CPT | Performed by: INTERNAL MEDICINE

## 2018-03-20 PROCEDURE — 3044F HG A1C LEVEL LT 7.0%: CPT | Performed by: INTERNAL MEDICINE

## 2018-03-20 PROCEDURE — 99214 OFFICE O/P EST MOD 30 MIN: CPT | Performed by: INTERNAL MEDICINE

## 2018-03-20 PROCEDURE — G8417 CALC BMI ABV UP PARAM F/U: HCPCS | Performed by: INTERNAL MEDICINE

## 2018-03-20 PROCEDURE — 3014F SCREEN MAMMO DOC REV: CPT | Performed by: INTERNAL MEDICINE

## 2018-03-20 SDOH — SOCIAL STABILITY - SOCIAL INSECURITY: OTHER SPECIFIED PROBLEMS RELATED TO PRIMARY SUPPORT GROUP: Z63.8

## 2018-03-20 NOTE — PROGRESS NOTES
Chief Complaint   Patient presents with    3 Month Follow-Up     diabetes    Health Maintenance     had a hysterectomy and doesn't believe she needs a pap smear       HPI: Here for management of multiple chronic conditions as per the active problems list below, which I reviewed and updated with the patient today. States doing well with no new concerns except if noted below. She is feeling very stressed by current living situation, drugs all around her, including brother whom she expects to find dead someday soon (lives with him). Is looking for another place to live. Smoking about the same (5 cigs/day) and doesn't think she can quit soon. I have reviewed the chart notes available from myself and other providers. I have addressed all active problems listed below, and created or updated the problems list as needed. Patient Active Problem List   Diagnosis    Tobacco abuse    Type 2 diabetes mellitus ([address]    Essential hypertension    Pure hypercholesterolemia    Acquired hypothyroidism    Ischemic heart disease due to coronary artery obstruction (HCC)    Panlobular emphysema (HCC)    Moderate episode of recurrent major depressive disorder [facility]    Obstructive sleep apnea syndrome    H/O total hysterectomy       No problem-specific Assessment & Plan notes found for this encounter.       Discussed all labs, other tests, and imaging if available   Lab Results   Component Value Date    WBC 8.4 12/28/2017    HGB 13.3 12/28/2017    HCT 39.3 12/28/2017    MCV 85.1 12/28/2017     12/28/2017    LYMPHOPCT 34.6 12/28/2017    RBC 4.62 12/28/2017    MCH 28.7 12/28/2017    MCHC 33.8 12/28/2017    RDW 14.0 12/28/2017     Lab Results   Component Value Date     12/28/2017    K 3.8 12/28/2017     12/28/2017    CO2 25 12/28/2017    BUN 14 12/28/2017    CREATININE 0.6 12/28/2017    GLUCOSE 89 12/28/2017    CALCIUM 9.4 12/28/2017    PROT 6.7 12/28/2017    LABALBU 3.9 12/28/2017    BILITOT 0.3

## 2018-03-21 DIAGNOSIS — I10 ESSENTIAL HYPERTENSION: ICD-10-CM

## 2018-03-21 DIAGNOSIS — I25.9 ISCHEMIC HEART DISEASE DUE TO CORONARY ARTERY OBSTRUCTION (HCC): ICD-10-CM

## 2018-03-21 DIAGNOSIS — E78.5 HYPERLIPIDEMIA LDL GOAL <70: ICD-10-CM

## 2018-03-21 DIAGNOSIS — F33.1 MODERATE EPISODE OF RECURRENT MAJOR DEPRESSIVE DISORDER (HCC): ICD-10-CM

## 2018-03-21 DIAGNOSIS — I25.10 CORONARY ARTERY DISEASE INVOLVING NATIVE CORONARY ARTERY OF NATIVE HEART WITHOUT ANGINA PECTORIS: ICD-10-CM

## 2018-03-21 DIAGNOSIS — I24.0 ISCHEMIC HEART DISEASE DUE TO CORONARY ARTERY OBSTRUCTION (HCC): ICD-10-CM

## 2018-03-22 RX ORDER — ATORVASTATIN CALCIUM 80 MG/1
80 TABLET, FILM COATED ORAL DAILY
Qty: 90 TABLET | Refills: 2 | Status: SHIPPED | OUTPATIENT
Start: 2018-03-22 | End: 2018-07-24 | Stop reason: SDUPTHER

## 2018-03-22 RX ORDER — CARVEDILOL 3.12 MG/1
3.12 TABLET ORAL 2 TIMES DAILY WITH MEALS
Qty: 180 TABLET | Refills: 2 | Status: SHIPPED | OUTPATIENT
Start: 2018-03-22 | End: 2018-07-24 | Stop reason: SDUPTHER

## 2018-03-22 RX ORDER — LISINOPRIL 10 MG/1
10 TABLET ORAL NIGHTLY
Qty: 90 TABLET | Refills: 2 | Status: SHIPPED | OUTPATIENT
Start: 2018-03-22 | End: 2019-03-30 | Stop reason: SDUPTHER

## 2018-03-22 RX ORDER — PRASUGREL 10 MG/1
10 TABLET, FILM COATED ORAL DAILY
Qty: 30 TABLET | Refills: 5 | Status: SHIPPED | OUTPATIENT
Start: 2018-03-22 | End: 2018-07-24 | Stop reason: ALTCHOICE

## 2018-03-22 RX ORDER — CITALOPRAM 20 MG/1
20 TABLET ORAL DAILY
Qty: 30 TABLET | Refills: 11 | Status: SHIPPED | OUTPATIENT
Start: 2018-03-22 | End: 2019-03-30 | Stop reason: SDUPTHER

## 2018-03-22 RX ORDER — FUROSEMIDE 40 MG/1
40 TABLET ORAL 2 TIMES DAILY
Qty: 180 TABLET | Refills: 2 | Status: SHIPPED | OUTPATIENT
Start: 2018-03-22 | End: 2018-07-24 | Stop reason: SDUPTHER

## 2018-03-22 RX ORDER — LEVOTHYROXINE SODIUM 0.05 MG/1
50 TABLET ORAL DAILY
Qty: 30 TABLET | Refills: 11 | Status: SHIPPED | OUTPATIENT
Start: 2018-03-22 | End: 2019-03-30 | Stop reason: SDUPTHER

## 2018-03-22 RX ORDER — FAMOTIDINE 20 MG/1
20 TABLET, FILM COATED ORAL 2 TIMES DAILY
Qty: 60 TABLET | Refills: 0 | Status: SHIPPED | OUTPATIENT
Start: 2018-03-22 | End: 2018-05-26 | Stop reason: SDUPTHER

## 2018-05-09 NOTE — LETTER
[address]. [place] [address]  Phone: [phone]  Fax: [phone]    Bobby Morales CNP        January 5, 2018     Ana Escobedo, [address] [facility] [address]    Patient: Charlie Torres  MR Number: [id number]  YOB: 1955  Date of Visit: 1/5/2018    Dear Dr. Perez Agent:    Thank you for the request for consultation for [facility] VIS. HPI:  The patient is 58 y.o. female with a past medical history significant for CAD/prior stenting, HTN, DM, obesity, KATELYN and COPD with continued tobacco use who is here for follow up. Last seen in hospital in 3/2017 with exacerbation of her COPD. Seen in ED in late 12/2017 with exacerbation of her COPD and placed on abx and discharged. Overall doing okay from cardiac standpoint. Since seen in ED in late Dec 2017 and placed on abx feeling much better. Continues to smoke, trying to cut back. Has chronic SOB with stairs and uses O2 at home at night. She is going to be scheduling sleep study (had previously been on CPAP). Cough productive white phlegm (chronic). Chronically sleeps with HOB elevated. Denies chest pain/discomfort, PND,  palpitations, further dizziness, syncope, edema ,weight change or claudication. No regular exercise but active around the house. Assessment:    1. Coronary artery disease involving native coronary artery of native heart without angina pectoris  -6/2016: MI and stenting in [place]; ANNALEE to RCA  -6/2016: staged procedure with stent LAD  -continue ASA, statin, coreg and Effient  -risk factor modification  -no recurrent angina    2. Tobacco abuse  -smoking cessation emphasized    3. Obstructive sleep apnea syndrome  -awaiting sleep study; has had CPAP in the past    4. Essential hypertension  -controlled  -continue medical management    5.  Hyperlipidemia LDL goal <70  -on statin  -LDL 98 on last lipids from 3/2017 (1) bedfast

## 2018-05-29 RX ORDER — FAMOTIDINE 20 MG/1
20 TABLET, FILM COATED ORAL 2 TIMES DAILY
Qty: 60 TABLET | Refills: 5 | Status: SHIPPED | OUTPATIENT
Start: 2018-05-29 | End: 2018-12-03 | Stop reason: SDUPTHER

## 2018-07-05 DIAGNOSIS — J44.1 COPD EXACERBATION (HCC): ICD-10-CM

## 2018-07-24 ENCOUNTER — OFFICE VISIT (OUTPATIENT)
Dept: CARDIOLOGY CLINIC | Age: 63
End: 2018-07-24

## 2018-07-24 VITALS
HEIGHT: 57 IN | BODY MASS INDEX: 43.58 KG/M2 | SYSTOLIC BLOOD PRESSURE: 120 MMHG | HEART RATE: 74 BPM | OXYGEN SATURATION: 97 % | DIASTOLIC BLOOD PRESSURE: 78 MMHG | WEIGHT: 202 LBS

## 2018-07-24 DIAGNOSIS — Z72.0 TOBACCO ABUSE: ICD-10-CM

## 2018-07-24 DIAGNOSIS — G47.33 OBSTRUCTIVE SLEEP APNEA SYNDROME: ICD-10-CM

## 2018-07-24 DIAGNOSIS — I25.5 ISCHEMIC CARDIOMYOPATHY: ICD-10-CM

## 2018-07-24 DIAGNOSIS — I25.10 CORONARY ARTERY DISEASE INVOLVING NATIVE CORONARY ARTERY OF NATIVE HEART WITHOUT ANGINA PECTORIS: Primary | ICD-10-CM

## 2018-07-24 DIAGNOSIS — E78.5 HYPERLIPIDEMIA LDL GOAL <70: ICD-10-CM

## 2018-07-24 DIAGNOSIS — I10 ESSENTIAL HYPERTENSION: ICD-10-CM

## 2018-07-24 PROCEDURE — G8417 CALC BMI ABV UP PARAM F/U: HCPCS | Performed by: INTERNAL MEDICINE

## 2018-07-24 PROCEDURE — G8598 ASA/ANTIPLAT THER USED: HCPCS | Performed by: INTERNAL MEDICINE

## 2018-07-24 PROCEDURE — 93000 ELECTROCARDIOGRAM COMPLETE: CPT | Performed by: INTERNAL MEDICINE

## 2018-07-24 PROCEDURE — 3017F COLORECTAL CA SCREEN DOC REV: CPT | Performed by: INTERNAL MEDICINE

## 2018-07-24 PROCEDURE — 99214 OFFICE O/P EST MOD 30 MIN: CPT | Performed by: INTERNAL MEDICINE

## 2018-07-24 PROCEDURE — G8427 DOCREV CUR MEDS BY ELIG CLIN: HCPCS | Performed by: INTERNAL MEDICINE

## 2018-07-24 PROCEDURE — 4004F PT TOBACCO SCREEN RCVD TLK: CPT | Performed by: INTERNAL MEDICINE

## 2018-07-24 RX ORDER — PRASUGREL 10 MG/1
10 TABLET, FILM COATED ORAL DAILY
Qty: 900 TABLET | Refills: 5 | Status: CANCELLED | OUTPATIENT
Start: 2018-07-24

## 2018-07-24 RX ORDER — NITROGLYCERIN 0.4 MG/1
0.4 TABLET SUBLINGUAL EVERY 5 MIN PRN
Qty: 25 TABLET | Refills: 1 | Status: SHIPPED | OUTPATIENT
Start: 2018-07-24 | End: 2019-10-09 | Stop reason: SDUPTHER

## 2018-07-24 RX ORDER — FUROSEMIDE 40 MG/1
40 TABLET ORAL 2 TIMES DAILY
Qty: 180 TABLET | Refills: 2 | Status: SHIPPED | OUTPATIENT
Start: 2018-07-24 | End: 2019-01-29 | Stop reason: SDUPTHER

## 2018-07-24 RX ORDER — CARVEDILOL 3.12 MG/1
3.12 TABLET ORAL 2 TIMES DAILY WITH MEALS
Qty: 180 TABLET | Refills: 2 | Status: SHIPPED | OUTPATIENT
Start: 2018-07-24 | End: 2019-01-29 | Stop reason: SDUPTHER

## 2018-07-24 RX ORDER — ASPIRIN 81 MG/1
81 TABLET, CHEWABLE ORAL DAILY
Qty: 90 TABLET | Refills: 2 | Status: ON HOLD | OUTPATIENT
Start: 2018-07-24 | End: 2021-03-22 | Stop reason: HOSPADM

## 2018-07-24 RX ORDER — ATORVASTATIN CALCIUM 80 MG/1
80 TABLET, FILM COATED ORAL DAILY
Qty: 90 TABLET | Refills: 2 | Status: SHIPPED | OUTPATIENT
Start: 2018-07-24 | End: 2019-09-18

## 2018-07-24 RX ORDER — CLOPIDOGREL BISULFATE 75 MG/1
75 TABLET ORAL DAILY
Qty: 30 TABLET | Refills: 3 | Status: SHIPPED | OUTPATIENT
Start: 2018-07-24 | End: 2019-09-18 | Stop reason: SDUPTHER

## 2018-07-24 NOTE — PROGRESS NOTES
[address]  Office Visit    Alayna Najera  1955 July 24, 2018    CC:   Chief Complaint   Patient presents with    Coronary Artery Disease     HTN, HLD    Chest Pain     every now and then, comes and goes    Medication Refill     orders pending     HPI:  The patient is 58 y.o. female with a past medical history significant for CAD/prior stenting, HTN, DM, obesity, KATELYN and COPD with continued tobacco use who is here for follow up. Last seen in hospital in 3/2017 with exacerbation of her COPD. Seen in ED in late 12/2017 with exacerbation of her COPD and placed on abx and discharged. Patient is here for 6 month f/u. She was last seen by JUAN, Daniel Sanchez and doing well. Today, she denies any chest pain. She doesn't participate in any formal exercise. Says she hasn't been taking Atorvastatin, \"I thought I didn't need it. \" Denies chest pain/discomfort, PND,  palpitations, dizziness, syncope, edema ,weight change or claudication. Review of Systems:  Constitutional: Denies  fatigue, weakness, night sweats or fever. HEENT: Denies new visual changes, ringing in ears, nosebleeds, nasal congestion  Respiratory: Denies new or change in SOB, PND, orthopnea or cough. Cardiovascular: see HPI  GI: Denies N/V, diarrhea, constipation, abdominal pain, change in bowel habits, melena or hematochezia  : Denies urinary frequency, urgency, incontinence, hematuria or dysuria. Skin: Denies rash, hives, or cyanosis  Musculoskeletal: Denies joint or muscle aches/pain  Neurological: Denies syncope or TIA-like symptoms.   Psychiatric: Denies anxiety, insomnia or depression     Past Medical History:   Diagnosis Date    Asthma     CAD (coronary artery disease)     CHF [facility]congestive heart failure) ([facility])     COPD (chronic obstructive pulmonary disease) ([facility])     Depression     Diabetes mellitus ([facility] [address]     Hyperlipidemia     Hypertension     Hypothyroidism     Sleep apnea     Substance 89 12/28/2017    CALCIUM 9.4 12/28/2017      Lab Results   Component Value Date    WBC 8.4 12/28/2017    HGB 13.3 12/28/2017    HCT 39.3 12/28/2017    MCV 85.1 12/28/2017     12/28/2017 6/2016 [facility]):  Cardiac cath: 80% LAD, 50% ramus and occluded RCA  She underwent ANNALEE to RCA and in separate procedure stent to LAD. Assessment/Plan:    CAD  6/2016: MI and stenting in [place]; ANNALEE to RCA  6/2016: staged procedure with stent LAD  No recurrent angina   continue ASA, statin, coreg and Effient  Once Effient completed, switch to Plavix     Essential hypertension  Controlled  Continue medical management    Hyperlipidemia LDL goal <70  Has not been taking statin  Emphasized importance of medication compliance  Encouraged to resume Atorvastatin   Lipid panel in 6 months     Tobacco abuse  Smoking cessation emphasized    KATELYN  Non compliant with CPAP    Ischemic Cardiomyopathy  Echo to evaluate LV function      Follow up in 6 months       Thanks for allowing me to participate in the care of this patient.       Reynaldo Louis MD  [facility], [address] [place] [address] [address], [address] [address]  Office: [phone]  Fax: [phone]

## 2018-07-24 NOTE — PATIENT INSTRUCTIONS
time unless the doctor told you to. Statins can interact with other medicines. · Always tell your doctor if you think you are having a side effect. If side effects are a problem with one medicine, a different one may be used. · Keep making the lifestyle changes your doctor suggests. Eat heart-healthy foods, be active, don't smoke, and stay at a healthy weight. · Talk to your doctor about avoiding grapefruit juice if you take statins. Grapefruit juice can raise the level of this medicine in your blood. This could increase side effects. When should you call for help? Watch closely for changes in your health, and be sure to contact your doctor if:    · You think you are having problems with your medicine.     · You have aches or muscle pain. Where can you learn more? Go to [email or web address]. org and sign in to your [employer] account. Enter R358 in the Nitro PDF box to learn more about \"Statins: Care Instructions. \"     If you do not have an account, please click on the \"Sign Up Now\" link. Current as of: May 10, 2017  Content Version: 11.6  © 1528-1968 [employer]. Care instructions adapted under license by [facility] "[employer] [facility] ([facility]). If you have questions about a medical condition or this instruction, always ask your healthcare professional. [address] any warranty or liability for your use of this information.

## 2018-07-30 ENCOUNTER — OFFICE VISIT (OUTPATIENT)
Dept: INTERNAL MEDICINE CLINIC | Age: 63
End: 2018-07-30

## 2018-07-30 VITALS
HEART RATE: 88 BPM | OXYGEN SATURATION: 94 % | RESPIRATION RATE: 16 BRPM | HEIGHT: 57 IN | DIASTOLIC BLOOD PRESSURE: 82 MMHG | BODY MASS INDEX: 43.58 KG/M2 | SYSTOLIC BLOOD PRESSURE: 120 MMHG | WEIGHT: 202 LBS

## 2018-07-30 DIAGNOSIS — Z72.0 TOBACCO ABUSE: ICD-10-CM

## 2018-07-30 DIAGNOSIS — I25.9 ISCHEMIC HEART DISEASE DUE TO CORONARY ARTERY OBSTRUCTION (HCC): ICD-10-CM

## 2018-07-30 DIAGNOSIS — Z13.220 SCREENING FOR HYPERLIPIDEMIA: ICD-10-CM

## 2018-07-30 DIAGNOSIS — E11.8 TYPE 2 DIABETES MELLITUS WITH COMPLICATION, WITHOUT LONG-TERM CURRENT USE OF INSULIN (HCC): Primary | ICD-10-CM

## 2018-07-30 DIAGNOSIS — J43.1 PANLOBULAR EMPHYSEMA (HCC): ICD-10-CM

## 2018-07-30 DIAGNOSIS — F17.200 TOBACCO USE DISORDER: ICD-10-CM

## 2018-07-30 DIAGNOSIS — E03.9 ACQUIRED HYPOTHYROIDISM: ICD-10-CM

## 2018-07-30 DIAGNOSIS — Z12.31 ENCOUNTER FOR SCREENING MAMMOGRAM FOR BREAST CANCER: ICD-10-CM

## 2018-07-30 DIAGNOSIS — I24.0 ISCHEMIC HEART DISEASE DUE TO CORONARY ARTERY OBSTRUCTION (HCC): ICD-10-CM

## 2018-07-30 DIAGNOSIS — I10 ESSENTIAL HYPERTENSION: ICD-10-CM

## 2018-07-30 DIAGNOSIS — E78.00 PURE HYPERCHOLESTEROLEMIA: ICD-10-CM

## 2018-07-30 LAB
CHOLESTEROL, FASTING: 201 MG/DL (ref 0–199)
HDLC SERPL-MCNC: 55 MG/DL (ref 40–60)
LDL CHOLESTEROL CALCULATED: 120 MG/DL
TRIGLYCERIDE, FASTING: 130 MG/DL (ref 0–150)
VLDLC SERPL CALC-MCNC: 26 MG/DL

## 2018-07-30 PROCEDURE — 3017F COLORECTAL CA SCREEN DOC REV: CPT | Performed by: INTERNAL MEDICINE

## 2018-07-30 PROCEDURE — 99214 OFFICE O/P EST MOD 30 MIN: CPT | Performed by: INTERNAL MEDICINE

## 2018-07-30 PROCEDURE — 3023F SPIROM DOC REV: CPT | Performed by: INTERNAL MEDICINE

## 2018-07-30 PROCEDURE — 99406 BEHAV CHNG SMOKING 3-10 MIN: CPT | Performed by: INTERNAL MEDICINE

## 2018-07-30 PROCEDURE — 2022F DILAT RTA XM EVC RTNOPTHY: CPT | Performed by: INTERNAL MEDICINE

## 2018-07-30 PROCEDURE — G8417 CALC BMI ABV UP PARAM F/U: HCPCS | Performed by: INTERNAL MEDICINE

## 2018-07-30 PROCEDURE — G8427 DOCREV CUR MEDS BY ELIG CLIN: HCPCS | Performed by: INTERNAL MEDICINE

## 2018-07-30 PROCEDURE — 3044F HG A1C LEVEL LT 7.0%: CPT | Performed by: INTERNAL MEDICINE

## 2018-07-30 PROCEDURE — G8598 ASA/ANTIPLAT THER USED: HCPCS | Performed by: INTERNAL MEDICINE

## 2018-07-30 PROCEDURE — 4004F PT TOBACCO SCREEN RCVD TLK: CPT | Performed by: INTERNAL MEDICINE

## 2018-07-30 PROCEDURE — G8926 SPIRO NO PERF OR DOC: HCPCS | Performed by: INTERNAL MEDICINE

## 2018-07-30 NOTE — PROGRESS NOTES
Chief Complaint   Patient presents with    Diabetes    Health Maintenance     needs eye exam and mammo       HPI: Here for diabetes followup and  management of multiple chronic conditions as per the active problems list below, which I reviewed and updated with the patient today. States doing well with no new concerns except if noted below. Still has not completed blood work ordered at March appointment and now has duplicate order for lipids from Dr. Ray Epperson. I have reviewed the chart notes available from myself and other providers. I have addressed all active problems listed below, and created or updated the problems list as needed. Patient Active Problem List   Diagnosis    Personal history of tobacco use, presenting hazards to health    Type 2 diabetes mellitus ([facility] [address]    Essential hypertension    Pure hypercholesterolemia    Acquired hypothyroidism    Ischemic heart disease due to coronary artery obstruction (HCC)    Panlobular emphysema (HCC)    Moderate episode of recurrent major depressive disorder ([facility] [facility]    Obstructive sleep apnea syndrome    H/O total hysterectomy    Tobacco abuse       No problem-specific Assessment & Plan notes found for this encounter.       Discussed all labs, other tests, and imaging if available   Lab Results   Component Value Date    WBC 8.4 12/28/2017    HGB 13.3 12/28/2017    HCT 39.3 12/28/2017    MCV 85.1 12/28/2017     12/28/2017    LYMPHOPCT 34.6 12/28/2017    RBC 4.62 12/28/2017    MCH 28.7 12/28/2017    MCHC 33.8 12/28/2017    RDW 14.0 12/28/2017     Lab Results   Component Value Date     12/28/2017    K 3.8 12/28/2017     12/28/2017    CO2 25 12/28/2017    BUN 14 12/28/2017    CREATININE 0.6 12/28/2017    GLUCOSE 89 12/28/2017    CALCIUM 9.4 12/28/2017    PROT 6.7 12/28/2017    LABALBU 3.9 12/28/2017    BILITOT 0.3 12/28/2017    ALKPHOS 66 12/28/2017    AST 17 12/28/2017    ALT 15 12/28/2017    LABGLOM >60 12/28/2017    GFRAA >60 chloride (KLOR-CON M) 20 MEQ extended release tablet Take 1 tablet by mouth 2 times daily 1/5/18  Yes Hortencia Thompson Brood, APRN - CNP   metFORMIN (GLUCOPHAGE) 1000 MG tablet Take 1 tablet by mouth 2 times daily (with meals) 12/21/17  Yes MD BUCK Dickson  CONSTITUTIONAL: [x] All Symptoms Negative  [] Fever     [] Chills     [] Weight Loss  [] Fatigue     [] Sweating     [] Weakness  Comments:     EYE: [] All Symptoms Negative  [] Blurred Vision     [] Double Vision     [] Light Sensitivity  [] Eye Pain     [] Eye Discharge     [] Eye Redness  Comments:  Have to go to eye dr    GASTROINTESTINAL: [x] All Symptoms Negative  [] Heart Burn     [] Nausea     [] Vomiting  [] Abdominal Pain     Diarrhea     [] Constipation  [] Blood in Stool     [] Black Tarry Stool  Comments:     ENDO: [] All Symptoms Negative  [] Easy Bruising     [] Increased Thirst  Comments: like always    SKIN: [] All Symptoms Negative  [] Rash     [] Itching  Comments:     NEUROLOGICAL: [x] All Symptoms Negative  [] Dizziness     [] Tingling     [] Tremor  [] Sensory Change     [] Speech Change     [] Focal Weakness  [] Seizures     [] LOC  Comments:     CARDIOVASCULAR: [x] All Symptoms Negative  [] Chest Pain     [] Palpitations     [] Orthopnea  [] Cramps When Walking     [x] Leg Swelling  Comments:     URINARY: [x] All Symptoms Negative  [] Pain/Burning     [] Urgency     [] Frequency  [] Blood in Urine     [] Flank Pain  Comments:     PSYCHIATRIC: [] All Symptoms Negative  [] Nervous/Anxious     [] Suicidal Ideas     [] Substance Use/Abuse  [] Hallucinations     [] Nervous/Anxious     [] Insomnia  [] Memory Loss  Comments: Depression    HENT: [x] All Symptoms Negative  [] Headache     [] Hearing Loss     [] Ringing     [] Ear Pain  [] Ear Discharge     [] Nose Bleed     [] Congestion  [] Stridor     [] Sore Throat  Comments:     RESPIRATORY: [x] All Sympto ms Negative  [] Cough     [] Bloody Mucous     [] Sputum Production  [] kg/m². Physical Exam   Constitutional: She is oriented to person, place, and time and well-developed, well-nourished, and in no distress. No distress. HENT:   Head: Normocephalic and atraumatic. Nose: Nose normal.   Mouth/Throat: No oropharyngeal exudate. Eyes: Conjunctivae and EOM are normal. Pupils are equal, round, and reactive to light. Right eye exhibits no discharge. Left eye exhibits no discharge. No scleral icterus. Neck: Neck supple. No JVD present. No tracheal deviation present. No thyromegaly present. Cardiovascular: Normal rate, regular rhythm, normal heart sounds and intact distal pulses. Exam reveals no gallop and no friction rub. No murmur heard. Pulmonary/Chest: Effort normal and breath sounds normal. No respiratory distress. She has no wheezes. She has no rales. She exhibits no tenderness. Abdominal: Soft. Bowel sounds are normal. She exhibits no distension and no mass. There is no tenderness. There is no rebound and no guarding. Musculoskeletal: Normal range of motion. She exhibits no edema or tenderness. Lymphadenopathy:     She has no cervical adenopathy. Neurological: She is alert and oriented to person, place, and time. She has normal reflexes. No cranial nerve deficit. She exhibits normal muscle tone. Gait normal. Coordination normal. GCS score is 15. Skin: Skin is warm and dry. No rash noted. No erythema. No pallor. Psychiatric: Mood, memory, affect and judgment normal.        ASSESSMENT AND PLANS:      Except as noted below, all chronic problems have been reviewed and are stable to continue medications or other therapy as previously documented in the patient's chart, with changes per orders or documentation below:        Assessment and Plan: Patient received counseling and, if relevant, printed instructions for all symptoms listed in CC and HPI, as well as for all diagnoses brought onto today's visit note below.  Typical counseling includes, but is not limited to,

## 2018-10-08 DIAGNOSIS — I25.10 CORONARY ARTERY DISEASE INVOLVING NATIVE CORONARY ARTERY OF NATIVE HEART WITHOUT ANGINA PECTORIS: ICD-10-CM

## 2018-10-10 RX ORDER — ASPIRIN 81 MG/1
81 TABLET, CHEWABLE ORAL DAILY
Qty: 90 TABLET | Refills: 2 | Status: SHIPPED | OUTPATIENT
Start: 2018-10-10 | End: 2019-01-29 | Stop reason: SDUPTHER

## 2018-11-05 DIAGNOSIS — I25.10 CORONARY ARTERY DISEASE INVOLVING NATIVE CORONARY ARTERY OF NATIVE HEART WITHOUT ANGINA PECTORIS: ICD-10-CM

## 2018-11-05 RX ORDER — PRASUGREL 10 MG/1
10 TABLET, FILM COATED ORAL DAILY
Qty: 30 TABLET | OUTPATIENT
Start: 2018-11-05

## 2018-11-05 NOTE — TELEPHONE ENCOUNTER
Patient requesting a medication refill.   Medication: prasurgrel  Dosage: 10 mg  Frequency: 1 tab daily

## 2018-11-14 DIAGNOSIS — I25.10 CORONARY ARTERY DISEASE INVOLVING NATIVE CORONARY ARTERY OF NATIVE HEART WITHOUT ANGINA PECTORIS: ICD-10-CM

## 2018-11-14 DIAGNOSIS — I10 ESSENTIAL HYPERTENSION: ICD-10-CM

## 2018-11-19 ENCOUNTER — APPOINTMENT (OUTPATIENT)
Dept: GENERAL RADIOLOGY | Age: 63
End: 2018-11-19
Payer: COMMERCIAL

## 2018-11-19 ENCOUNTER — HOSPITAL ENCOUNTER (EMERGENCY)
Age: 63
Discharge: HOME | End: 2018-11-19
Attending: EMERGENCY MEDICINE
Payer: COMMERCIAL

## 2018-11-19 VITALS
DIASTOLIC BLOOD PRESSURE: 74 MMHG | BODY MASS INDEX: 44.94 KG/M2 | OXYGEN SATURATION: 95 % | SYSTOLIC BLOOD PRESSURE: 119 MMHG | RESPIRATION RATE: 14 BRPM | TEMPERATURE: 98.9 F | WEIGHT: 207.67 LBS | HEART RATE: 66 BPM

## 2018-11-19 DIAGNOSIS — R07.9 CHEST PAIN, UNSPECIFIED TYPE: ICD-10-CM

## 2018-11-19 DIAGNOSIS — J18.9 LINGULAR PNEUMONIA: ICD-10-CM

## 2018-11-19 DIAGNOSIS — J44.1 COPD EXACERBATION (HCC): Primary | ICD-10-CM

## 2018-11-19 LAB
A/G RATIO: 1.3 (ref 1.1–2.2)
ALBUMIN SERPL-MCNC: 4.2 G/DL (ref 3.4–5)
ALP BLD-CCNC: 87 U/L (ref 40–129)
ALT SERPL-CCNC: 27 U/L (ref 10–40)
ANION GAP SERPL CALCULATED.3IONS-SCNC: 13 MMOL/L (ref 3–16)
AST SERPL-CCNC: 19 U/L (ref 15–37)
BASOPHILS ABSOLUTE: 0.1 K/UL (ref 0–0.2)
BASOPHILS RELATIVE PERCENT: 0.5 %
BILIRUB SERPL-MCNC: <0.2 MG/DL (ref 0–1)
BUN BLDV-MCNC: 21 MG/DL (ref 7–20)
CALCIUM SERPL-MCNC: 9.8 MG/DL (ref 8.3–10.6)
CHLORIDE BLD-SCNC: 99 MMOL/L (ref 99–110)
CO2: 28 MMOL/L (ref 21–32)
CREAT SERPL-MCNC: 0.7 MG/DL (ref 0.6–1.2)
EOSINOPHILS ABSOLUTE: 0.3 K/UL (ref 0–0.6)
EOSINOPHILS RELATIVE PERCENT: 2.4 %
GFR AFRICAN AMERICAN: >60
GFR NON-AFRICAN AMERICAN: >60
GLOBULIN: 3.2 G/DL
GLUCOSE BLD-MCNC: 112 MG/DL (ref 70–99)
HCT VFR BLD CALC: 42.1 % (ref 36–48)
HEMOGLOBIN: 13.9 G/DL (ref 12–16)
LACTIC ACID: 0.7 MMOL/L (ref 0.4–2)
LYMPHOCYTES ABSOLUTE: 2.3 K/UL (ref 1–5.1)
LYMPHOCYTES RELATIVE PERCENT: 20.1 %
MCH RBC QN AUTO: 28.5 PG (ref 26–34)
MCHC RBC AUTO-ENTMCNC: 32.9 G/DL (ref 31–36)
MCV RBC AUTO: 86.4 FL (ref 80–100)
MONOCYTES ABSOLUTE: 0.7 K/UL (ref 0–1.3)
MONOCYTES RELATIVE PERCENT: 6.6 %
NEUTROPHILS ABSOLUTE: 7.9 K/UL (ref 1.7–7.7)
NEUTROPHILS RELATIVE PERCENT: 70.4 %
PDW BLD-RTO: 14 % (ref 12.4–15.4)
PLATELET # BLD: 345 K/UL (ref 135–450)
PMV BLD AUTO: 8.7 FL (ref 5–10.5)
POTASSIUM SERPL-SCNC: 4 MMOL/L (ref 3.5–5.1)
RBC # BLD: 4.88 M/UL (ref 4–5.2)
SODIUM BLD-SCNC: 140 MMOL/L (ref 136–145)
TOTAL PROTEIN: 7.4 G/DL (ref 6.4–8.2)
TROPONIN: <0.01 NG/ML
WBC # BLD: 11.2 K/UL (ref 4–11)

## 2018-11-19 PROCEDURE — 6360000002 HC RX W HCPCS: Performed by: EMERGENCY MEDICINE

## 2018-11-19 PROCEDURE — 84484 ASSAY OF TROPONIN QUANT: CPT

## 2018-11-19 PROCEDURE — 2700000000 HC OXYGEN THERAPY PER DAY

## 2018-11-19 PROCEDURE — 83605 ASSAY OF LACTIC ACID: CPT

## 2018-11-19 PROCEDURE — 2580000003 HC RX 258: Performed by: EMERGENCY MEDICINE

## 2018-11-19 PROCEDURE — 6370000000 HC RX 637 (ALT 250 FOR IP): Performed by: EMERGENCY MEDICINE

## 2018-11-19 PROCEDURE — 80053 COMPREHEN METABOLIC PANEL: CPT

## 2018-11-19 PROCEDURE — 94640 AIRWAY INHALATION TREATMENT: CPT

## 2018-11-19 PROCEDURE — 96374 THER/PROPH/DIAG INJ IV PUSH: CPT

## 2018-11-19 PROCEDURE — 93005 ELECTROCARDIOGRAM TRACING: CPT | Performed by: EMERGENCY MEDICINE

## 2018-11-19 PROCEDURE — 99285 EMERGENCY DEPT VISIT HI MDM: CPT

## 2018-11-19 PROCEDURE — 94664 DEMO&/EVAL PT USE INHALER: CPT

## 2018-11-19 PROCEDURE — 71046 X-RAY EXAM CHEST 2 VIEWS: CPT

## 2018-11-19 PROCEDURE — 96361 HYDRATE IV INFUSION ADD-ON: CPT

## 2018-11-19 PROCEDURE — 93010 ELECTROCARDIOGRAM REPORT: CPT | Performed by: INTERNAL MEDICINE

## 2018-11-19 PROCEDURE — 85025 COMPLETE CBC W/AUTO DIFF WBC: CPT

## 2018-11-19 RX ORDER — AZITHROMYCIN 250 MG/1
250 TABLET, FILM COATED ORAL SEE ADMIN INSTRUCTIONS
Qty: 6 TABLET | Refills: 0 | Status: SHIPPED | OUTPATIENT
Start: 2018-11-19 | End: 2018-11-24

## 2018-11-19 RX ORDER — 0.9 % SODIUM CHLORIDE 0.9 %
500 INTRAVENOUS SOLUTION INTRAVENOUS ONCE
Status: COMPLETED | OUTPATIENT
Start: 2018-11-19 | End: 2018-11-19

## 2018-11-19 RX ORDER — AZITHROMYCIN 500 MG/1
500 TABLET, FILM COATED ORAL ONCE
Status: COMPLETED | OUTPATIENT
Start: 2018-11-19 | End: 2018-11-19

## 2018-11-19 RX ORDER — METHYLPREDNISOLONE SODIUM SUCCINATE 125 MG/2ML
125 INJECTION, POWDER, LYOPHILIZED, FOR SOLUTION INTRAMUSCULAR; INTRAVENOUS ONCE
Status: COMPLETED | OUTPATIENT
Start: 2018-11-19 | End: 2018-11-19

## 2018-11-19 RX ORDER — ALBUTEROL SULFATE 2.5 MG/3ML
2.5 SOLUTION RESPIRATORY (INHALATION) ONCE
Status: COMPLETED | OUTPATIENT
Start: 2018-11-19 | End: 2018-11-19

## 2018-11-19 RX ORDER — IPRATROPIUM BROMIDE AND ALBUTEROL SULFATE 2.5; .5 MG/3ML; MG/3ML
1 SOLUTION RESPIRATORY (INHALATION) ONCE
Status: COMPLETED | OUTPATIENT
Start: 2018-11-19 | End: 2018-11-19

## 2018-11-19 RX ORDER — PREDNISONE 10 MG/1
60 TABLET ORAL DAILY
Qty: 30 TABLET | Refills: 0 | Status: SHIPPED | OUTPATIENT
Start: 2018-11-19 | End: 2018-11-24

## 2018-11-19 RX ADMIN — AZITHROMYCIN 500 MG: 500 TABLET, FILM COATED ORAL at 18:10

## 2018-11-19 RX ADMIN — IPRATROPIUM BROMIDE AND ALBUTEROL SULFATE 1 AMPULE: .5; 3 SOLUTION RESPIRATORY (INHALATION) at 16:32

## 2018-11-19 RX ADMIN — SODIUM CHLORIDE 500 ML: 9 INJECTION, SOLUTION INTRAVENOUS at 16:28

## 2018-11-19 RX ADMIN — METHYLPREDNISOLONE SODIUM SUCCINATE 125 MG: 125 INJECTION, POWDER, FOR SOLUTION INTRAMUSCULAR; INTRAVENOUS at 16:28

## 2018-11-19 RX ADMIN — ALBUTEROL SULFATE 2.5 MG: 2.5 SOLUTION RESPIRATORY (INHALATION) at 16:32

## 2018-11-19 ASSESSMENT — ENCOUNTER SYMPTOMS
CHEST TIGHTNESS: 1
SHORTNESS OF BREATH: 1
NAUSEA: 1
VOMITING: 0
SORE THROAT: 0
ABDOMINAL PAIN: 0
DIARRHEA: 0
COUGH: 1

## 2018-11-21 LAB
EKG ATRIAL RATE: 82 BPM
EKG DIAGNOSIS: NORMAL
EKG P AXIS: 52 DEGREES
EKG P-R INTERVAL: 158 MS
EKG Q-T INTERVAL: 398 MS
EKG QRS DURATION: 72 MS
EKG QTC CALCULATION (BAZETT): 464 MS
EKG R AXIS: -22 DEGREES
EKG T AXIS: 9 DEGREES
EKG VENTRICULAR RATE: 82 BPM

## 2018-12-03 DIAGNOSIS — I10 ESSENTIAL HYPERTENSION: ICD-10-CM

## 2018-12-03 DIAGNOSIS — I25.10 CORONARY ARTERY DISEASE INVOLVING NATIVE CORONARY ARTERY OF NATIVE HEART WITHOUT ANGINA PECTORIS: ICD-10-CM

## 2018-12-03 RX ORDER — FAMOTIDINE 20 MG/1
TABLET, FILM COATED ORAL
Qty: 60 TABLET | Refills: 5 | Status: ON HOLD | OUTPATIENT
Start: 2018-12-03 | End: 2021-03-02 | Stop reason: SDUPTHER

## 2018-12-05 RX ORDER — FUROSEMIDE 40 MG/1
40 TABLET ORAL 2 TIMES DAILY
Qty: 60 TABLET | Refills: 2 | Status: SHIPPED | OUTPATIENT
Start: 2018-12-05 | End: 2019-05-30 | Stop reason: SDUPTHER

## 2018-12-12 DIAGNOSIS — I10 ESSENTIAL HYPERTENSION: ICD-10-CM

## 2018-12-12 DIAGNOSIS — I25.10 CORONARY ARTERY DISEASE INVOLVING NATIVE CORONARY ARTERY OF NATIVE HEART WITHOUT ANGINA PECTORIS: ICD-10-CM

## 2018-12-14 RX ORDER — CARVEDILOL 3.12 MG/1
3.12 TABLET ORAL 2 TIMES DAILY WITH MEALS
Qty: 180 TABLET | Refills: 1 | Status: SHIPPED | OUTPATIENT
Start: 2018-12-14 | End: 2019-03-30 | Stop reason: SDUPTHER

## 2019-01-29 ENCOUNTER — HOSPITAL ENCOUNTER (OUTPATIENT)
Dept: GENERAL RADIOLOGY | Age: 64
Discharge: HOME | End: 2019-01-29
Payer: COMMERCIAL

## 2019-01-29 ENCOUNTER — OFFICE VISIT (OUTPATIENT)
Dept: INTERNAL MEDICINE CLINIC | Age: 64
End: 2019-01-29
Payer: COMMERCIAL

## 2019-01-29 ENCOUNTER — HOSPITAL ENCOUNTER (OUTPATIENT)
Age: 64
Discharge: HOME | End: 2019-01-29
Payer: COMMERCIAL

## 2019-01-29 VITALS
BODY MASS INDEX: 45.01 KG/M2 | WEIGHT: 208 LBS | DIASTOLIC BLOOD PRESSURE: 83 MMHG | TEMPERATURE: 96.6 F | RESPIRATION RATE: 16 BRPM | SYSTOLIC BLOOD PRESSURE: 127 MMHG | OXYGEN SATURATION: 95 % | HEART RATE: 80 BPM

## 2019-01-29 DIAGNOSIS — Z72.0 TOBACCO ABUSE: ICD-10-CM

## 2019-01-29 DIAGNOSIS — I25.9 ISCHEMIC HEART DISEASE DUE TO CORONARY ARTERY OBSTRUCTION (HCC): ICD-10-CM

## 2019-01-29 DIAGNOSIS — R93.89 ABNORMAL CHEST X-RAY: ICD-10-CM

## 2019-01-29 DIAGNOSIS — E11.8 TYPE 2 DIABETES MELLITUS WITH COMPLICATION, WITHOUT LONG-TERM CURRENT USE OF INSULIN (HCC): Primary | ICD-10-CM

## 2019-01-29 DIAGNOSIS — J44.1 COPD EXACERBATION (HCC): ICD-10-CM

## 2019-01-29 DIAGNOSIS — E66.01 MORBID OBESITY WITH BMI OF 40.0-44.9, ADULT (HCC): ICD-10-CM

## 2019-01-29 DIAGNOSIS — E03.9 ACQUIRED HYPOTHYROIDISM: ICD-10-CM

## 2019-01-29 DIAGNOSIS — E11.42 DIABETIC POLYNEUROPATHY ASSOCIATED WITH TYPE 2 DIABETES MELLITUS (HCC): ICD-10-CM

## 2019-01-29 DIAGNOSIS — I10 ESSENTIAL HYPERTENSION: ICD-10-CM

## 2019-01-29 DIAGNOSIS — F33.1 MODERATE EPISODE OF RECURRENT MAJOR DEPRESSIVE DISORDER (HCC): ICD-10-CM

## 2019-01-29 DIAGNOSIS — J43.1 PANLOBULAR EMPHYSEMA (HCC): ICD-10-CM

## 2019-01-29 DIAGNOSIS — E78.00 PURE HYPERCHOLESTEROLEMIA: ICD-10-CM

## 2019-01-29 DIAGNOSIS — G47.33 OBSTRUCTIVE SLEEP APNEA SYNDROME: ICD-10-CM

## 2019-01-29 DIAGNOSIS — I24.0 ISCHEMIC HEART DISEASE DUE TO CORONARY ARTERY OBSTRUCTION (HCC): ICD-10-CM

## 2019-01-29 LAB
CREATININE URINE POCT: 80.7
HBA1C MFR BLD: 5.6 %
MICROALBUMIN/CREAT 24H UR: 300 MG/G{CREAT}
MICROALBUMIN/CREAT UR-RTO: 371.7

## 2019-01-29 PROCEDURE — 4004F PT TOBACCO SCREEN RCVD TLK: CPT | Performed by: INTERNAL MEDICINE

## 2019-01-29 PROCEDURE — G8926 SPIRO NO PERF OR DOC: HCPCS | Performed by: INTERNAL MEDICINE

## 2019-01-29 PROCEDURE — 82044 UR ALBUMIN SEMIQUANTITATIVE: CPT | Performed by: INTERNAL MEDICINE

## 2019-01-29 PROCEDURE — 99215 OFFICE O/P EST HI 40 MIN: CPT | Performed by: INTERNAL MEDICINE

## 2019-01-29 PROCEDURE — 90682 RIV4 VACC RECOMBINANT DNA IM: CPT | Performed by: INTERNAL MEDICINE

## 2019-01-29 PROCEDURE — 99406 BEHAV CHNG SMOKING 3-10 MIN: CPT | Performed by: INTERNAL MEDICINE

## 2019-01-29 PROCEDURE — 3023F SPIROM DOC REV: CPT | Performed by: INTERNAL MEDICINE

## 2019-01-29 PROCEDURE — 2022F DILAT RTA XM EVC RTNOPTHY: CPT | Performed by: INTERNAL MEDICINE

## 2019-01-29 PROCEDURE — G0008 ADMIN INFLUENZA VIRUS VAC: HCPCS | Performed by: INTERNAL MEDICINE

## 2019-01-29 PROCEDURE — 3017F COLORECTAL CA SCREEN DOC REV: CPT | Performed by: INTERNAL MEDICINE

## 2019-01-29 PROCEDURE — G8417 CALC BMI ABV UP PARAM F/U: HCPCS | Performed by: INTERNAL MEDICINE

## 2019-01-29 PROCEDURE — G8484 FLU IMMUNIZE NO ADMIN: HCPCS | Performed by: INTERNAL MEDICINE

## 2019-01-29 PROCEDURE — G8427 DOCREV CUR MEDS BY ELIG CLIN: HCPCS | Performed by: INTERNAL MEDICINE

## 2019-01-29 PROCEDURE — 3044F HG A1C LEVEL LT 7.0%: CPT | Performed by: INTERNAL MEDICINE

## 2019-01-29 PROCEDURE — 71046 X-RAY EXAM CHEST 2 VIEWS: CPT

## 2019-01-29 PROCEDURE — 83036 HEMOGLOBIN GLYCOSYLATED A1C: CPT | Performed by: INTERNAL MEDICINE

## 2019-01-29 PROCEDURE — G8598 ASA/ANTIPLAT THER USED: HCPCS | Performed by: INTERNAL MEDICINE

## 2019-01-29 RX ORDER — GABAPENTIN 800 MG/1
800 TABLET ORAL 3 TIMES DAILY
Status: ON HOLD | COMMUNITY
End: 2021-03-02 | Stop reason: HOSPADM

## 2019-01-29 RX ORDER — LEVOFLOXACIN 500 MG/1
500 TABLET, FILM COATED ORAL DAILY
Qty: 10 TABLET | Refills: 0 | Status: SHIPPED | OUTPATIENT
Start: 2019-01-29 | End: 2019-02-08

## 2019-01-29 ASSESSMENT — ENCOUNTER SYMPTOMS
STRIDOR: 0
VOMITING: 0
DIARRHEA: 0
COUGH: 1
PHOTOPHOBIA: 0
EYE DISCHARGE: 0
SORE THROAT: 0
EYE PAIN: 0
BLOOD IN STOOL: 0
CONSTIPATION: 0
ABDOMINAL PAIN: 0
BACK PAIN: 0
SHORTNESS OF BREATH: 0
NAUSEA: 0
EYE REDNESS: 0
WHEEZING: 0

## 2019-02-01 ENCOUNTER — OFFICE VISIT (OUTPATIENT)
Dept: CARDIOLOGY CLINIC | Age: 64
End: 2019-02-01
Payer: COMMERCIAL

## 2019-02-01 VITALS
WEIGHT: 208 LBS | SYSTOLIC BLOOD PRESSURE: 110 MMHG | OXYGEN SATURATION: 96 % | HEIGHT: 57 IN | BODY MASS INDEX: 44.88 KG/M2 | HEART RATE: 76 BPM | DIASTOLIC BLOOD PRESSURE: 68 MMHG

## 2019-02-01 DIAGNOSIS — I10 ESSENTIAL HYPERTENSION: ICD-10-CM

## 2019-02-01 DIAGNOSIS — I25.5 ISCHEMIC CARDIOMYOPATHY: ICD-10-CM

## 2019-02-01 DIAGNOSIS — G47.33 OSA (OBSTRUCTIVE SLEEP APNEA): ICD-10-CM

## 2019-02-01 DIAGNOSIS — I25.9 ISCHEMIC HEART DISEASE DUE TO CORONARY ARTERY OBSTRUCTION (HCC): Primary | ICD-10-CM

## 2019-02-01 DIAGNOSIS — I24.0 ISCHEMIC HEART DISEASE DUE TO CORONARY ARTERY OBSTRUCTION (HCC): Primary | ICD-10-CM

## 2019-02-01 DIAGNOSIS — E78.2 MIXED HYPERLIPIDEMIA: ICD-10-CM

## 2019-02-01 DIAGNOSIS — Z72.0 TOBACCO ABUSE: ICD-10-CM

## 2019-02-01 PROCEDURE — 4004F PT TOBACCO SCREEN RCVD TLK: CPT | Performed by: INTERNAL MEDICINE

## 2019-02-01 PROCEDURE — 3017F COLORECTAL CA SCREEN DOC REV: CPT | Performed by: INTERNAL MEDICINE

## 2019-02-01 PROCEDURE — G8482 FLU IMMUNIZE ORDER/ADMIN: HCPCS | Performed by: INTERNAL MEDICINE

## 2019-02-01 PROCEDURE — G8427 DOCREV CUR MEDS BY ELIG CLIN: HCPCS | Performed by: INTERNAL MEDICINE

## 2019-02-01 PROCEDURE — 99214 OFFICE O/P EST MOD 30 MIN: CPT | Performed by: INTERNAL MEDICINE

## 2019-02-01 PROCEDURE — G8598 ASA/ANTIPLAT THER USED: HCPCS | Performed by: INTERNAL MEDICINE

## 2019-02-01 PROCEDURE — G8417 CALC BMI ABV UP PARAM F/U: HCPCS | Performed by: INTERNAL MEDICINE

## 2019-02-04 RX ORDER — DILTIAZEM HYDROCHLORIDE 60 MG/1
2 TABLET, FILM COATED ORAL 2 TIMES DAILY
Qty: 1 INHALER | Refills: 11 | Status: ON HOLD | OUTPATIENT
Start: 2019-02-04 | End: 2021-03-02 | Stop reason: SDUPTHER

## 2019-03-20 ENCOUNTER — TELEPHONE (OUTPATIENT)
Dept: INTERNAL MEDICINE CLINIC | Age: 64
End: 2019-03-20

## 2019-03-30 DIAGNOSIS — I24.0 ISCHEMIC HEART DISEASE DUE TO CORONARY ARTERY OBSTRUCTION (HCC): ICD-10-CM

## 2019-03-30 DIAGNOSIS — F33.1 MODERATE EPISODE OF RECURRENT MAJOR DEPRESSIVE DISORDER (HCC): ICD-10-CM

## 2019-03-30 DIAGNOSIS — I10 ESSENTIAL HYPERTENSION: ICD-10-CM

## 2019-03-30 DIAGNOSIS — I25.10 CORONARY ARTERY DISEASE INVOLVING NATIVE CORONARY ARTERY OF NATIVE HEART WITHOUT ANGINA PECTORIS: ICD-10-CM

## 2019-03-30 DIAGNOSIS — I25.9 ISCHEMIC HEART DISEASE DUE TO CORONARY ARTERY OBSTRUCTION (HCC): ICD-10-CM

## 2019-04-01 RX ORDER — LISINOPRIL 10 MG/1
10 TABLET ORAL NIGHTLY
Qty: 90 TABLET | Refills: 2 | Status: ON HOLD | OUTPATIENT
Start: 2019-04-01 | End: 2021-03-02 | Stop reason: HOSPADM

## 2019-04-01 RX ORDER — CITALOPRAM 20 MG/1
20 TABLET ORAL DAILY
Qty: 30 TABLET | Refills: 11 | Status: ON HOLD | OUTPATIENT
Start: 2019-04-01 | End: 2021-03-02 | Stop reason: SDUPTHER

## 2019-04-01 RX ORDER — LEVOTHYROXINE SODIUM 0.05 MG/1
50 TABLET ORAL DAILY
Qty: 30 TABLET | Refills: 11 | Status: ON HOLD | OUTPATIENT
Start: 2019-04-01 | End: 2021-03-02 | Stop reason: SDUPTHER

## 2019-04-01 RX ORDER — CARVEDILOL 3.12 MG/1
3.12 TABLET ORAL 2 TIMES DAILY WITH MEALS
Qty: 180 TABLET | Refills: 1 | Status: SHIPPED | OUTPATIENT
Start: 2019-04-01 | End: 2020-02-21

## 2019-04-01 NOTE — TELEPHONE ENCOUNTER
Patient requesting a medication refill.   Medication: multiple meds  Last filled on: 3/18/19  [facility]  Next office visit: 5/30/19  Last regular office visit: 1/29/19

## 2019-04-10 ENCOUNTER — TELEPHONE (OUTPATIENT)
Dept: INTERNAL MEDICINE CLINIC | Age: 64
End: 2019-04-10

## 2019-04-10 DIAGNOSIS — E11.8 TYPE 2 DIABETES MELLITUS WITH COMPLICATION, WITHOUT LONG-TERM CURRENT USE OF INSULIN (HCC): ICD-10-CM

## 2019-04-10 NOTE — TELEPHONE ENCOUNTER
Patient requesting a medication refill.   Medication metformin  Dosage 1000mg  FrequencyBID with meals  Last filled on 12/21/17  [address]  Next office visit5/30/19  Last regular office visit1/29/19

## 2019-04-10 NOTE — TELEPHONE ENCOUNTER
Patient requesting a medication refill.   Medication:metFORMIN (GLUCOPHAGE) 1000 MG tablet  Pharmacy:[facility], [address] [facility]  Last office visit: 01/29/2019  Next office visit: 5/30/2019

## 2019-05-30 ENCOUNTER — OFFICE VISIT (OUTPATIENT)
Dept: INTERNAL MEDICINE CLINIC | Age: 64
End: 2019-05-30
Payer: COMMERCIAL

## 2019-05-30 VITALS
HEIGHT: 60 IN | TEMPERATURE: 97.1 F | SYSTOLIC BLOOD PRESSURE: 119 MMHG | DIASTOLIC BLOOD PRESSURE: 76 MMHG | WEIGHT: 211 LBS | BODY MASS INDEX: 41.43 KG/M2 | HEART RATE: 76 BPM | OXYGEN SATURATION: 96 %

## 2019-05-30 DIAGNOSIS — E11.8 TYPE 2 DIABETES MELLITUS WITH COMPLICATION, WITHOUT LONG-TERM CURRENT USE OF INSULIN (HCC): ICD-10-CM

## 2019-05-30 DIAGNOSIS — E03.9 ACQUIRED HYPOTHYROIDISM: ICD-10-CM

## 2019-05-30 DIAGNOSIS — I10 ESSENTIAL HYPERTENSION: ICD-10-CM

## 2019-05-30 DIAGNOSIS — F33.1 MODERATE EPISODE OF RECURRENT MAJOR DEPRESSIVE DISORDER (HCC): ICD-10-CM

## 2019-05-30 DIAGNOSIS — I24.0 ISCHEMIC HEART DISEASE DUE TO CORONARY ARTERY OBSTRUCTION (HCC): ICD-10-CM

## 2019-05-30 DIAGNOSIS — E78.00 PURE HYPERCHOLESTEROLEMIA: ICD-10-CM

## 2019-05-30 DIAGNOSIS — I25.10 CORONARY ARTERY DISEASE INVOLVING NATIVE CORONARY ARTERY OF NATIVE HEART WITHOUT ANGINA PECTORIS: ICD-10-CM

## 2019-05-30 DIAGNOSIS — J43.1 PANLOBULAR EMPHYSEMA (HCC): Primary | ICD-10-CM

## 2019-05-30 DIAGNOSIS — I25.9 ISCHEMIC HEART DISEASE DUE TO CORONARY ARTERY OBSTRUCTION (HCC): ICD-10-CM

## 2019-05-30 DIAGNOSIS — Z72.0 TOBACCO ABUSE: ICD-10-CM

## 2019-05-30 DIAGNOSIS — E66.01 MORBID OBESITY WITH BMI OF 40.0-44.9, ADULT (HCC): ICD-10-CM

## 2019-05-30 LAB
A/G RATIO: 1.4 (ref 1.1–2.2)
ALBUMIN SERPL-MCNC: 4.2 G/DL (ref 3.4–5)
ALP BLD-CCNC: 83 U/L (ref 40–129)
ALT SERPL-CCNC: 13 U/L (ref 10–40)
ANION GAP SERPL CALCULATED.3IONS-SCNC: 16 MMOL/L (ref 3–16)
AST SERPL-CCNC: 13 U/L (ref 15–37)
BILIRUB SERPL-MCNC: 0.5 MG/DL (ref 0–1)
BUN BLDV-MCNC: 13 MG/DL (ref 7–20)
CALCIUM SERPL-MCNC: 9.6 MG/DL (ref 8.3–10.6)
CHLORIDE BLD-SCNC: 103 MMOL/L (ref 99–110)
CHOLESTEROL, TOTAL: 242 MG/DL (ref 0–199)
CO2: 27 MMOL/L (ref 21–32)
CREAT SERPL-MCNC: 0.7 MG/DL (ref 0.6–1.2)
GFR AFRICAN AMERICAN: >60
GFR NON-AFRICAN AMERICAN: >60
GLOBULIN: 2.9 G/DL
GLUCOSE BLD-MCNC: 89 MG/DL (ref 70–99)
HBA1C MFR BLD: 6 %
HDLC SERPL-MCNC: 49 MG/DL (ref 40–60)
LDL CHOLESTEROL CALCULATED: 164 MG/DL
POTASSIUM SERPL-SCNC: 4.5 MMOL/L (ref 3.5–5.1)
SODIUM BLD-SCNC: 146 MMOL/L (ref 136–145)
TOTAL PROTEIN: 7.1 G/DL (ref 6.4–8.2)
TRIGL SERPL-MCNC: 147 MG/DL (ref 0–150)
TSH REFLEX FT4: 2.44 UIU/ML (ref 0.27–4.2)
VLDLC SERPL CALC-MCNC: 29 MG/DL

## 2019-05-30 PROCEDURE — 4004F PT TOBACCO SCREEN RCVD TLK: CPT | Performed by: INTERNAL MEDICINE

## 2019-05-30 PROCEDURE — 3017F COLORECTAL CA SCREEN DOC REV: CPT | Performed by: INTERNAL MEDICINE

## 2019-05-30 PROCEDURE — 3044F HG A1C LEVEL LT 7.0%: CPT | Performed by: INTERNAL MEDICINE

## 2019-05-30 PROCEDURE — 83036 HEMOGLOBIN GLYCOSYLATED A1C: CPT | Performed by: INTERNAL MEDICINE

## 2019-05-30 PROCEDURE — G8598 ASA/ANTIPLAT THER USED: HCPCS | Performed by: INTERNAL MEDICINE

## 2019-05-30 PROCEDURE — 99215 OFFICE O/P EST HI 40 MIN: CPT | Performed by: INTERNAL MEDICINE

## 2019-05-30 PROCEDURE — G8926 SPIRO NO PERF OR DOC: HCPCS | Performed by: INTERNAL MEDICINE

## 2019-05-30 PROCEDURE — 3023F SPIROM DOC REV: CPT | Performed by: INTERNAL MEDICINE

## 2019-05-30 PROCEDURE — G8417 CALC BMI ABV UP PARAM F/U: HCPCS | Performed by: INTERNAL MEDICINE

## 2019-05-30 PROCEDURE — G8427 DOCREV CUR MEDS BY ELIG CLIN: HCPCS | Performed by: INTERNAL MEDICINE

## 2019-05-30 PROCEDURE — 2022F DILAT RTA XM EVC RTNOPTHY: CPT | Performed by: INTERNAL MEDICINE

## 2019-05-30 ASSESSMENT — ENCOUNTER SYMPTOMS
SHORTNESS OF BREATH: 1
SINUS PAIN: 0
EYE ITCHING: 0
PHOTOPHOBIA: 0
EYE PAIN: 0
CHOKING: 0
VOICE CHANGE: 0
COLOR CHANGE: 0
RHINORRHEA: 0
DIARRHEA: 0
ANAL BLEEDING: 0
SORE THROAT: 0
STRIDOR: 0
RECTAL PAIN: 0
GASTROINTESTINAL NEGATIVE: 1
VOMITING: 0
NAUSEA: 0
BLOOD IN STOOL: 0
EYE DISCHARGE: 0
WHEEZING: 0
CHEST TIGHTNESS: 0
BACK PAIN: 0
TROUBLE SWALLOWING: 0
APNEA: 0
COUGH: 0
SINUS PRESSURE: 0
ALLERGIC/IMMUNOLOGIC NEGATIVE: 1
ABDOMINAL DISTENTION: 0
FACIAL SWELLING: 0
ABDOMINAL PAIN: 0
EYE REDNESS: 0
CONSTIPATION: 0
EYES NEGATIVE: 1

## 2019-05-30 NOTE — PROGRESS NOTES
Review of Systems   Constitutional: Negative. Negative for activity change, appetite change, chills, diaphoresis, fatigue, fever and unexpected weight change. HENT: Negative. Negative for congestion, dental problem, drooling, ear discharge, ear pain, facial swelling, hearing loss, mouth sores, nosebleeds, postnasal drip, rhinorrhea, sinus pressure, sinus pain, sneezing, sore throat, tinnitus, trouble swallowing and voice change. Eyes: Negative. Negative for photophobia, pain, discharge, redness, itching and visual disturbance. BLURRED VISION   Respiratory: Positive for shortness of breath (COPD). Negative for apnea, cough, choking, chest tightness, wheezing and stridor. Cardiovascular: Positive for leg swelling (HURT SO BAD - SOMETIMES CAN' T WALK). Negative for chest pain and palpitations. Gastrointestinal: Negative. Negative for abdominal distention, abdominal pain, anal bleeding, blood in stool, constipation, diarrhea, nausea, rectal pain and vomiting. Endocrine: Negative. Negative for cold intolerance, heat intolerance, polydipsia, polyphagia and polyuria. Genitourinary: Negative. Negative for decreased urine volume, difficulty urinating, dyspareunia, dysuria, enuresis, flank pain, frequency, genital sores, hematuria, menstrual problem, pelvic pain, urgency, vaginal bleeding, vaginal discharge and vaginal pain. Musculoskeletal: Negative. Negative for arthralgias, back pain, gait problem, joint swelling, myalgias, neck pain and neck stiffness. Skin: Negative. Negative for color change, pallor, rash and wound. Allergic/Immunologic: Negative. Negative for environmental allergies, food allergies and immunocompromised state. Neurological: Negative. Negative for dizziness, tremors, seizures, syncope, facial asymmetry, speech difficulty, weakness, light-headedness, numbness and headaches. Hematological: Negative. Negative for adenopathy. Does not bruise/bleed easily.

## 2019-05-30 NOTE — PROGRESS NOTES
20 MG tablet TAKE 1 TABLET BY MOUTH TWICE A DAY 12/3/18  Yes Florentino Real MD   aspirin 81 MG chewable tablet Take 1 tablet by mouth daily 18  Yes Nayan Birmingham MD   atorvastatin (LIPITOR) 80 MG tablet Take 1 tablet by mouth daily 18  Yes Nayan Birmingham MD   clopidogrel (PLAVIX) 75 MG tablet Take 1 tablet by mouth daily 18  Yes Nayan Birmingham MD   VENTOLIN  (90 Base) MCG/ACT inhaler INHALE 2 PUFFS EVERY 6 HOURS AS NEEDED FOR WHEEZING 18  Yes Florentino Real MD   potassium chloride (KLOR-CON M) 20 MEQ extended release tablet Take 1 tablet by mouth 2 times daily 18  Yes TERESA Sevilla - CNP   furosemide (LASIX) 40 MG tablet TAKE 1 TABLET BY MOUTH TWICE A DAY 19   Nayan Birmingham MD   Spacer/Aero-Holding Chambers (E-Z SPACER) ALBA 1 Device by Does not apply route daily 19   Florentino Real MD   nitroGLYCERIN (NITROSTAT) 0.4 MG SL tablet Place 1 tablet under the tongue every 5 minutes as needed for Chest pain up to max of 3 total doses. If no relief after 1 dose, call 911. 18   Nayan Birmingham MD       ROS: 12 systems reviewed, as documented by MA    Past Medical History:   Diagnosis Date    Asthma     CAD (coronary artery disease)     [facility]     COPD [facility]chronic obstructive pulmonary disease) [facility]     Depression     Diabetes mellitus [facility]     Hyperlipidemia     Hypertension     Hypothyroidism     Morbid obesity with BMI of 40.0-44.9, adult ([facility] 2019    Sleep apnea     Substance abuse ([facility]     Thyroid disease     Type 2 diabetes mellitus without complication (HCC)        Past Surgical History:   Procedure Laterality Date    ADENOIDECTOMY      CARDIAC SURGERY       SECTION      x3    CORONARY ANGIOPLASTY WITH STENT PLACEMENT      EYE SURGERY      HERNIA REPAIR      HYSTERECTOMY      SINUS SURGERY      TONSILLECTOMY         Social History     Socioeconomic History    Marital status:       Spouse name: Not on file    Number of children: Not on file    Years of education: Not on file    Highest education level: Not on file   Occupational History    Not on file   Social Needs    Financial resource strain: Not on file    Food insecurity:     Worry: Not on file     Inability: Not on file    Transportation needs:     Medical: Not on file     Non-medical: Not on file   Tobacco Use    Smoking status: Current Every Day Smoker     Packs/day: 0.50     Years: 45.00     Pack years: 22.50     Types: Cigarettes    Smokeless tobacco: Current User   Substance and Sexual Activity    Alcohol use: No    Drug use: No    Sexual activity: Not Currently   Lifestyle    Physical activity:     Days per week: Not on file     Minutes per session: Not on file    Stress: Not on file   Relationships    Social connections:     Talks on phone: Not on file     Gets together: Not on file     Attends Mosque service: Not on file     Active member of club or organization: Not on file     Attends meetings of clubs or organizations: Not on file     Relationship status: Not on file    Intimate partner violence:     Fear of current or ex partner: Not on file     Emotionally abused: Not on file     Physically abused: Not on file     Forced sexual activity: Not on file   Other Topics Concern    Not on file   Social History Narrative    Not on file       Family History   Problem Relation Age of Onset    Heart Disease Mother     High Blood Pressure Mother     Diabetes Mother     Heart Disease Father     High Blood Pressure Father     Diabetes Father          Health Maintenance Due   Topic Date Due    Diabetic retinal exam  11/12/1965    Breast cancer screen  11/12/2005         /76 (Site: Right Upper Arm, Position: Sitting, Cuff Size: Large Adult)   Pulse 76   Temp 97.1 °F (36.2 °C) (Oral)   Ht 5' (1.524 m)   Wt 211 lb (95.7 kg)   SpO2 96%   BMI 41.21 kg/m²   Body mass index is 41.21 kg/m².   Physical Exam 1 ampule (Completed)    ipratropium-albuterol (DUONEB) nebulizer solution 1 ampule (Completed)    ipratropium-albuterol (DUONEB) nebulizer solution 1 ampule (Completed)    VENTOLIN  (90 Base) MCG/ACT inhaler    albuterol (PROVENTIL) nebulizer solution 2.5 mg (Completed)    ipratropium-albuterol (DUONEB) nebulizer solution 1 ampule (Completed)    methylPREDNISolone sodium (SOLU-MEDROL) injection 125 mg (Completed)    Spacer/Aero-Holding Chambers (E-Z SPACER) ALBA    SYMBICORT 80-4.5 MCG/ACT AERO    Morbid obesity with BMI of 40.0-44.9, adult (HCC)    Relevant Medications    metFORMIN (GLUCOPHAGE) 1000 MG tablet    Moderate episode of recurrent major depressive disorder (HCC)    Relevant Medications    citalopram (CELEXA) 20 MG tablet    Ischemic heart disease due to coronary artery obstruction (HCC)    Relevant Medications    aspirin chewable tablet 324 mg (Completed)    nitroglycerin (NITRO-BID) 2 % ointment 1 inch (Completed)    aspirin 81 MG chewable tablet    nitroGLYCERIN (NITROSTAT) 0.4 MG SL tablet    atorvastatin (LIPITOR) 80 MG tablet    carvedilol (COREG) 3.125 MG tablet    lisinopril (PRINIVIL;ZESTRIL) 10 MG tablet    levothyroxine (SYNTHROID) 50 MCG tablet    furosemide (LASIX) 40 MG tablet    Essential hypertension    Relevant Medications    aspirin chewable tablet 324 mg (Completed)    nitroglycerin (NITRO-BID) 2 % ointment 1 inch (Completed)    aspirin 81 MG chewable tablet    nitroGLYCERIN (NITROSTAT) 0.4 MG SL tablet    atorvastatin (LIPITOR) 80 MG tablet    carvedilol (COREG) 3.125 MG tablet    lisinopril (PRINIVIL;ZESTRIL) 10 MG tablet    furosemide (LASIX) 40 MG tablet    Other Relevant Orders    COMPREHENSIVE METABOLIC PANEL (Completed)    Acquired hypothyroidism    Relevant Medications    levothyroxine (SYNTHROID) 50 MCG tablet    Other Relevant Orders    TSH WITH REFLEX TO FT4 (Completed)        Orders Placed This Encounter   Procedures    TSH WITH REFLEX TO FT4     Standing Status: Future     Number of Occurrences:   1     Standing Expiration Date:   5/30/2020    COMPREHENSIVE METABOLIC PANEL     Standing Status:   Future     Number of Occurrences:   1     Standing Expiration Date:   5/30/2020    LIPID PANEL     Standing Status:   Future     Number of Occurrences:   1     Standing Expiration Date:   5/30/2020     Order Specific Question:   Is Patient Fasting?/# of Hours     Answer:   unk    POCT glycosylated hemoglobin (Hb A1C)       I have reconciled the medications in chart with what patient reports to be taking, andreviewed action/ sideeffects and how to take any new medications. Patient/caregiver understands purpose and side effects. A complete  list of medications was provided in their after-visit summary. No follow-ups on file. Time basedbilling: I spent over 40 minutes with this patient, and as is the nature of primary care and typical for my extended visits, over 50 percent of this visit was spent on counseling and coordination ofcare.

## 2019-05-31 RX ORDER — FUROSEMIDE 40 MG/1
TABLET ORAL
Qty: 60 TABLET | Refills: 2 | Status: SHIPPED | OUTPATIENT
Start: 2019-05-31 | End: 2019-09-18 | Stop reason: SDUPTHER

## 2019-06-06 DIAGNOSIS — J44.1 COPD EXACERBATION (HCC): ICD-10-CM

## 2019-06-06 NOTE — TELEPHONE ENCOUNTER
Requested Prescriptions     Pending Prescriptions Disp Refills    albuterol sulfate  (90 Base) MCG/ACT inhaler [Pharmacy Med Name: ALBUTEROL AER HFA MG INHALANT]       Sig: INHALE 2 PUFFS EVERY 6 HOURS AS NEEDED FOR WHEEZING   Patient requesting a medication refill.     [facility]  Next office visit: 12/3/19  Last regular office visit: 5/3/19

## 2019-06-07 RX ORDER — ALBUTEROL SULFATE 90 UG/1
AEROSOL, METERED RESPIRATORY (INHALATION)
Qty: 1 INHALER | Refills: 11 | Status: SHIPPED | OUTPATIENT
Start: 2019-06-07 | End: 2019-09-18 | Stop reason: SDUPTHER

## 2019-06-13 ENCOUNTER — TELEPHONE (OUTPATIENT)
Dept: INTERNAL MEDICINE CLINIC | Age: 64
End: 2019-06-13

## 2019-06-19 ENCOUNTER — TELEPHONE (OUTPATIENT)
Dept: INTERNAL MEDICINE CLINIC | Age: 64
End: 2019-06-19

## 2019-06-19 NOTE — TELEPHONE ENCOUNTER
Patient states paper from Dr. Marianne Caldwell for patient's diabetic shoes, that is being faxed over to company from office is not getting to company, they are not receiving the fax and company need paper in order for patient to get shoes.  Best call back number [phone]

## 2019-06-20 ENCOUNTER — TELEPHONE (OUTPATIENT)
Dept: INTERNAL MEDICINE CLINIC | Age: 64
End: 2019-06-20

## 2019-06-20 DIAGNOSIS — E11.8 TYPE 2 DIABETES MELLITUS WITH COMPLICATION, WITHOUT LONG-TERM CURRENT USE OF INSULIN (HCC): Primary | ICD-10-CM

## 2019-06-20 NOTE — TELEPHONE ENCOUNTER
Patient called and stated she needs a referral to the podiatrist, Katharina Hoffman DPM.  She had one from Dr. Harry Wood in 2017.    Patient was seen in this office on 5/30/19

## 2019-08-02 ENCOUNTER — TELEPHONE (OUTPATIENT)
Dept: CARDIOLOGY CLINIC | Age: 64
End: 2019-08-02

## 2019-08-02 NOTE — TELEPHONE ENCOUNTER
Spoke with patient's sister regarding chest pain. She stated the chest pain woke her from sleep and felt like a pressure on her chest. She did take a nitro and the pain resolved and she has not had a recurrence. This was 2-3 days ago. She stated the patient does not experience any exertional chest pain. Instructed to continue to monitor and call the office with worsening symptoms and/or exertional chest pain. If the pain does not resolve she can proceed to ED. Sister did want the patient to be seen in office.  Scheduled appointment with Rafael Milian 8/14/19 at 11:20 am.

## 2019-08-14 DIAGNOSIS — I25.10 CORONARY ARTERY DISEASE INVOLVING NATIVE CORONARY ARTERY OF NATIVE HEART WITHOUT ANGINA PECTORIS: ICD-10-CM

## 2019-08-15 RX ORDER — ASPIRIN 81 MG
TABLET,CHEWABLE ORAL
Qty: 90 TABLET | Refills: 2 | Status: SHIPPED | OUTPATIENT
Start: 2019-08-15 | End: 2019-09-18

## 2019-09-18 ENCOUNTER — HOSPITAL ENCOUNTER (EMERGENCY)
Age: 64
Discharge: HOME | End: 2019-09-18
Attending: EMERGENCY MEDICINE
Payer: COMMERCIAL

## 2019-09-18 ENCOUNTER — APPOINTMENT (OUTPATIENT)
Dept: GENERAL RADIOLOGY | Age: 64
End: 2019-09-18
Payer: COMMERCIAL

## 2019-09-18 ENCOUNTER — APPOINTMENT (OUTPATIENT)
Dept: CT IMAGING | Age: 64
End: 2019-09-18
Payer: COMMERCIAL

## 2019-09-18 VITALS
DIASTOLIC BLOOD PRESSURE: 98 MMHG | HEART RATE: 69 BPM | SYSTOLIC BLOOD PRESSURE: 162 MMHG | OXYGEN SATURATION: 100 % | WEIGHT: 217.44 LBS | BODY MASS INDEX: 42.69 KG/M2 | TEMPERATURE: 97.5 F | HEIGHT: 60 IN | RESPIRATION RATE: 24 BRPM

## 2019-09-18 DIAGNOSIS — I10 ESSENTIAL HYPERTENSION: ICD-10-CM

## 2019-09-18 DIAGNOSIS — J44.1 COPD EXACERBATION (HCC): Primary | ICD-10-CM

## 2019-09-18 DIAGNOSIS — I25.10 CORONARY ARTERY DISEASE INVOLVING NATIVE CORONARY ARTERY OF NATIVE HEART WITHOUT ANGINA PECTORIS: ICD-10-CM

## 2019-09-18 LAB
ANION GAP SERPL CALCULATED.3IONS-SCNC: 10 MMOL/L (ref 3–16)
BASOPHILS ABSOLUTE: 0 K/UL (ref 0–0.2)
BASOPHILS RELATIVE PERCENT: 0.3 %
BUN BLDV-MCNC: 17 MG/DL (ref 7–20)
CALCIUM SERPL-MCNC: 9.7 MG/DL (ref 8.3–10.6)
CHLORIDE BLD-SCNC: 105 MMOL/L (ref 99–110)
CO2: 30 MMOL/L (ref 21–32)
CREAT SERPL-MCNC: 0.9 MG/DL (ref 0.6–1.2)
EOSINOPHILS ABSOLUTE: 0.4 K/UL (ref 0–0.6)
EOSINOPHILS RELATIVE PERCENT: 4 %
GFR AFRICAN AMERICAN: >60
GFR NON-AFRICAN AMERICAN: >60
GLUCOSE BLD-MCNC: 125 MG/DL (ref 70–99)
HCT VFR BLD CALC: 39.6 % (ref 36–48)
HEMOGLOBIN: 13 G/DL (ref 12–16)
LYMPHOCYTES ABSOLUTE: 2.6 K/UL (ref 1–5.1)
LYMPHOCYTES RELATIVE PERCENT: 29.5 %
MCH RBC QN AUTO: 28.7 PG (ref 26–34)
MCHC RBC AUTO-ENTMCNC: 32.9 G/DL (ref 31–36)
MCV RBC AUTO: 87.1 FL (ref 80–100)
MONOCYTES ABSOLUTE: 0.8 K/UL (ref 0–1.3)
MONOCYTES RELATIVE PERCENT: 9.2 %
NEUTROPHILS ABSOLUTE: 5.1 K/UL (ref 1.7–7.7)
NEUTROPHILS RELATIVE PERCENT: 57 %
PDW BLD-RTO: 13.2 % (ref 12.4–15.4)
PLATELET # BLD: 287 K/UL (ref 135–450)
PMV BLD AUTO: 8.9 FL (ref 5–10.5)
POTASSIUM SERPL-SCNC: 3.8 MMOL/L (ref 3.5–5.1)
PRO-BNP: 108 PG/ML (ref 0–124)
RBC # BLD: 4.54 M/UL (ref 4–5.2)
SODIUM BLD-SCNC: 145 MMOL/L (ref 136–145)
TROPONIN: <0.01 NG/ML
WBC # BLD: 8.9 K/UL (ref 4–11)

## 2019-09-18 PROCEDURE — 99285 EMERGENCY DEPT VISIT HI MDM: CPT

## 2019-09-18 PROCEDURE — 6370000000 HC RX 637 (ALT 250 FOR IP): Performed by: EMERGENCY MEDICINE

## 2019-09-18 PROCEDURE — 36415 COLL VENOUS BLD VENIPUNCTURE: CPT

## 2019-09-18 PROCEDURE — 80048 BASIC METABOLIC PNL TOTAL CA: CPT

## 2019-09-18 PROCEDURE — 83880 ASSAY OF NATRIURETIC PEPTIDE: CPT

## 2019-09-18 PROCEDURE — 84484 ASSAY OF TROPONIN QUANT: CPT

## 2019-09-18 PROCEDURE — 71260 CT THORAX DX C+: CPT

## 2019-09-18 PROCEDURE — 85025 COMPLETE CBC W/AUTO DIFF WBC: CPT

## 2019-09-18 PROCEDURE — 71046 X-RAY EXAM CHEST 2 VIEWS: CPT

## 2019-09-18 PROCEDURE — 6360000004 HC RX CONTRAST MEDICATION: Performed by: EMERGENCY MEDICINE

## 2019-09-18 RX ORDER — FUROSEMIDE 40 MG/1
TABLET ORAL
Qty: 30 TABLET | Refills: 0 | Status: SHIPPED | OUTPATIENT
Start: 2019-09-18 | End: 2019-10-09 | Stop reason: SDUPTHER

## 2019-09-18 RX ORDER — CLOPIDOGREL BISULFATE 75 MG/1
75 TABLET ORAL DAILY
Qty: 30 TABLET | Refills: 0 | Status: ON HOLD | OUTPATIENT
Start: 2019-09-18 | End: 2021-03-02 | Stop reason: HOSPADM

## 2019-09-18 RX ORDER — IPRATROPIUM BROMIDE AND ALBUTEROL SULFATE 2.5; .5 MG/3ML; MG/3ML
2 SOLUTION RESPIRATORY (INHALATION) ONCE
Status: COMPLETED | OUTPATIENT
Start: 2019-09-18 | End: 2019-09-18

## 2019-09-18 RX ORDER — PREDNISONE 20 MG/1
60 TABLET ORAL ONCE
Status: COMPLETED | OUTPATIENT
Start: 2019-09-18 | End: 2019-09-18

## 2019-09-18 RX ORDER — PREDNISONE 20 MG/1
60 TABLET ORAL DAILY
Qty: 12 TABLET | Refills: 0 | Status: SHIPPED | OUTPATIENT
Start: 2019-09-18 | End: 2019-09-22

## 2019-09-18 RX ORDER — ALBUTEROL SULFATE 90 UG/1
AEROSOL, METERED RESPIRATORY (INHALATION)
Qty: 1 INHALER | Refills: 11 | Status: ON HOLD | OUTPATIENT
Start: 2019-09-18 | End: 2021-03-02 | Stop reason: SDUPTHER

## 2019-09-18 RX ORDER — AZITHROMYCIN 250 MG/1
TABLET, FILM COATED ORAL
Qty: 1 PACKET | Refills: 0 | Status: SHIPPED | OUTPATIENT
Start: 2019-09-18 | End: 2019-09-28

## 2019-09-18 RX ADMIN — IOVERSOL 100 ML: 678 INJECTION INTRA-ARTERIAL; INTRAVENOUS at 08:09

## 2019-09-18 RX ADMIN — IPRATROPIUM BROMIDE AND ALBUTEROL SULFATE 2 AMPULE: .5; 3 SOLUTION RESPIRATORY (INHALATION) at 08:29

## 2019-09-18 RX ADMIN — IPRATROPIUM BROMIDE AND ALBUTEROL SULFATE 2 AMPULE: .5; 3 SOLUTION RESPIRATORY (INHALATION) at 07:33

## 2019-09-18 RX ADMIN — PREDNISONE 60 MG: 20 TABLET ORAL at 07:33

## 2019-09-18 ASSESSMENT — ENCOUNTER SYMPTOMS
VOMITING: 0
COUGH: 1
TROUBLE SWALLOWING: 0
NAUSEA: 0
SHORTNESS OF BREATH: 1
ABDOMINAL PAIN: 0
SPUTUM PRODUCTION: 1
WHEEZING: 1
FACIAL SWELLING: 0
COLOR CHANGE: 0
RHINORRHEA: 1
VOICE CHANGE: 0
STRIDOR: 0

## 2019-09-18 NOTE — ED PROVIDER NOTES
by Does not apply route daily    SYMBICORT 80-4.5 MCG/ACT AERO    INHALE 2 PUFFS INTO THE LUNGS 2 TIMES DAILY       ALLERGIES     Oxycodone-acetaminophen; Tramadol; Codeine; and Hydrocodone-acetaminophen    FAMILY HISTORY       Family History   Problem Relation Age of Onset    Heart Disease Mother     High Blood Pressure Mother     Diabetes Mother     Heart Disease Father     High Blood Pressure Father     Diabetes Father           SOCIAL HISTORY       Social History     Socioeconomic History    Marital status:       Spouse name: None    Number of children: None    Years of education: None    Highest education level: None   Occupational History    None   Social Needs    Financial resource strain: None    Food insecurity:     Worry: None     Inability: None    Transportation needs:     Medical: None     Non-medical: None   Tobacco Use    Smoking status: Current Every Day Smoker     Packs/day: 0.50     Years: 45.00     Pack years: 22.50     Types: Cigarettes    Smokeless tobacco: Current User   Substance and Sexual Activity    Alcohol use: No    Drug use: No    Sexual activity: Not Currently   Lifestyle    Physical activity:     Days per week: None     Minutes per session: None    Stress: None   Relationships    Social connections:     Talks on phone: None     Gets together: None     Attends Religion service: None     Active member of club or organization: None     Attends meetings of clubs or organizations: None     Relationship status: None    Intimate partner violence:     Fear of current or ex partner: None     Emotionally abused: None     Physically abused: None     Forced sexual activity: None   Other Topics Concern    None   Social History Narrative    None         PHYSICAL EXAM    (up to 7 for level 4, 8 or more for level 5)     ED Triage Vitals [09/18/19 0703]   BP Temp Temp Source Pulse Resp SpO2 Height Weight   (!) 147/72 97.5 °F (36.4 °C) Oral 73 20 94 % 5' (1.524 m) 217 lb

## 2019-09-20 ENCOUNTER — TELEPHONE (OUTPATIENT)
Dept: INTERNAL MEDICINE CLINIC | Age: 64
End: 2019-09-20

## 2019-09-21 DIAGNOSIS — I10 ESSENTIAL HYPERTENSION: ICD-10-CM

## 2019-09-21 DIAGNOSIS — I25.10 CORONARY ARTERY DISEASE INVOLVING NATIVE CORONARY ARTERY OF NATIVE HEART WITHOUT ANGINA PECTORIS: ICD-10-CM

## 2019-09-23 RX ORDER — FUROSEMIDE 40 MG/1
TABLET ORAL
Qty: 60 TABLET | Refills: 1 | Status: SHIPPED | OUTPATIENT
Start: 2019-09-23 | End: 2019-09-30 | Stop reason: SDUPTHER

## 2019-09-30 ENCOUNTER — HOSPITAL ENCOUNTER (INPATIENT)
Age: 64
LOS: 4 days | Discharge: HOME | DRG: 190 | End: 2019-10-04
Attending: EMERGENCY MEDICINE | Admitting: INTERNAL MEDICINE
Payer: COMMERCIAL

## 2019-09-30 ENCOUNTER — APPOINTMENT (OUTPATIENT)
Dept: GENERAL RADIOLOGY | Age: 64
DRG: 190 | End: 2019-09-30
Payer: COMMERCIAL

## 2019-09-30 DIAGNOSIS — R07.9 CHEST PAIN, UNSPECIFIED TYPE: Primary | ICD-10-CM

## 2019-09-30 DIAGNOSIS — E11.42 DIABETIC POLYNEUROPATHY ASSOCIATED WITH TYPE 2 DIABETES MELLITUS (HCC): ICD-10-CM

## 2019-09-30 DIAGNOSIS — J44.1 COPD WITH ACUTE EXACERBATION (HCC): ICD-10-CM

## 2019-09-30 PROBLEM — I50.33 ACUTE ON CHRONIC HEART FAILURE WITH NORMAL EJECTION FRACTION (HCC): Status: ACTIVE | Noted: 2019-09-30

## 2019-09-30 PROBLEM — I50.9 ACUTE ON CHRONIC HEART FAILURE (HCC): Status: ACTIVE | Noted: 2019-09-30

## 2019-09-30 LAB
A/G RATIO: 0.9 (ref 1.1–2.2)
ALBUMIN SERPL-MCNC: 3.2 G/DL (ref 3.4–5)
ALP BLD-CCNC: 75 U/L (ref 40–129)
ALT SERPL-CCNC: 14 U/L (ref 10–40)
ANION GAP SERPL CALCULATED.3IONS-SCNC: 14 MMOL/L (ref 3–16)
AST SERPL-CCNC: 11 U/L (ref 15–37)
BASOPHILS ABSOLUTE: 0 K/UL (ref 0–0.2)
BASOPHILS RELATIVE PERCENT: 0.4 %
BILIRUB SERPL-MCNC: 0.3 MG/DL (ref 0–1)
BUN BLDV-MCNC: 12 MG/DL (ref 7–20)
CALCIUM SERPL-MCNC: 9 MG/DL (ref 8.3–10.6)
CHLORIDE BLD-SCNC: 102 MMOL/L (ref 99–110)
CO2: 25 MMOL/L (ref 21–32)
CREAT SERPL-MCNC: 0.6 MG/DL (ref 0.6–1.2)
EKG ATRIAL RATE: 71 BPM
EKG DIAGNOSIS: NORMAL
EKG P AXIS: 62 DEGREES
EKG P-R INTERVAL: 160 MS
EKG Q-T INTERVAL: 410 MS
EKG QRS DURATION: 70 MS
EKG QTC CALCULATION (BAZETT): 445 MS
EKG R AXIS: -32 DEGREES
EKG T AXIS: 32 DEGREES
EKG VENTRICULAR RATE: 71 BPM
EOSINOPHILS ABSOLUTE: 0.2 K/UL (ref 0–0.6)
EOSINOPHILS RELATIVE PERCENT: 2.3 %
GFR AFRICAN AMERICAN: >60
GFR NON-AFRICAN AMERICAN: >60
GLOBULIN: 3.5 G/DL
GLUCOSE BLD-MCNC: 127 MG/DL (ref 70–99)
GLUCOSE BLD-MCNC: 148 MG/DL (ref 70–99)
GLUCOSE BLD-MCNC: 257 MG/DL (ref 70–99)
HCT VFR BLD CALC: 37.7 % (ref 36–48)
HEMOGLOBIN: 12.4 G/DL (ref 12–16)
LYMPHOCYTES ABSOLUTE: 1.6 K/UL (ref 1–5.1)
LYMPHOCYTES RELATIVE PERCENT: 15 %
MCH RBC QN AUTO: 28.5 PG (ref 26–34)
MCHC RBC AUTO-ENTMCNC: 33 G/DL (ref 31–36)
MCV RBC AUTO: 86.4 FL (ref 80–100)
MONOCYTES ABSOLUTE: 0.8 K/UL (ref 0–1.3)
MONOCYTES RELATIVE PERCENT: 7.8 %
NEUTROPHILS ABSOLUTE: 8 K/UL (ref 1.7–7.7)
NEUTROPHILS RELATIVE PERCENT: 74.5 %
PDW BLD-RTO: 13.1 % (ref 12.4–15.4)
PERFORMED ON: ABNORMAL
PERFORMED ON: ABNORMAL
PLATELET # BLD: 271 K/UL (ref 135–450)
PMV BLD AUTO: 9.4 FL (ref 5–10.5)
POTASSIUM SERPL-SCNC: 3.8 MMOL/L (ref 3.5–5.1)
PRO-BNP: 53 PG/ML (ref 0–124)
RBC # BLD: 4.37 M/UL (ref 4–5.2)
SODIUM BLD-SCNC: 141 MMOL/L (ref 136–145)
TOTAL PROTEIN: 6.7 G/DL (ref 6.4–8.2)
TROPONIN: <0.01 NG/ML
TROPONIN: <0.01 NG/ML
WBC # BLD: 10.8 K/UL (ref 4–11)

## 2019-09-30 PROCEDURE — 94640 AIRWAY INHALATION TREATMENT: CPT

## 2019-09-30 PROCEDURE — 1200000000 HC SEMI PRIVATE

## 2019-09-30 PROCEDURE — 6370000000 HC RX 637 (ALT 250 FOR IP): Performed by: INTERNAL MEDICINE

## 2019-09-30 PROCEDURE — 6360000002 HC RX W HCPCS: Performed by: PHYSICIAN ASSISTANT

## 2019-09-30 PROCEDURE — 80053 COMPREHEN METABOLIC PANEL: CPT

## 2019-09-30 PROCEDURE — 85025 COMPLETE CBC W/AUTO DIFF WBC: CPT

## 2019-09-30 PROCEDURE — 93010 ELECTROCARDIOGRAM REPORT: CPT | Performed by: INTERNAL MEDICINE

## 2019-09-30 PROCEDURE — 96374 THER/PROPH/DIAG INJ IV PUSH: CPT

## 2019-09-30 PROCEDURE — 71046 X-RAY EXAM CHEST 2 VIEWS: CPT

## 2019-09-30 PROCEDURE — 6370000000 HC RX 637 (ALT 250 FOR IP): Performed by: PHYSICIAN ASSISTANT

## 2019-09-30 PROCEDURE — 93005 ELECTROCARDIOGRAM TRACING: CPT | Performed by: PHYSICIAN ASSISTANT

## 2019-09-30 PROCEDURE — 99285 EMERGENCY DEPT VISIT HI MDM: CPT

## 2019-09-30 PROCEDURE — 2700000000 HC OXYGEN THERAPY PER DAY

## 2019-09-30 PROCEDURE — 84484 ASSAY OF TROPONIN QUANT: CPT

## 2019-09-30 PROCEDURE — 83880 ASSAY OF NATRIURETIC PEPTIDE: CPT

## 2019-09-30 PROCEDURE — 36415 COLL VENOUS BLD VENIPUNCTURE: CPT

## 2019-09-30 PROCEDURE — 2580000003 HC RX 258: Performed by: INTERNAL MEDICINE

## 2019-09-30 PROCEDURE — 94762 N-INVAS EAR/PLS OXIMTRY CONT: CPT

## 2019-09-30 PROCEDURE — 83036 HEMOGLOBIN GLYCOSYLATED A1C: CPT

## 2019-09-30 PROCEDURE — 6360000002 HC RX W HCPCS: Performed by: INTERNAL MEDICINE

## 2019-09-30 RX ORDER — DEXTROSE MONOHYDRATE 25 G/50ML
12.5 INJECTION, SOLUTION INTRAVENOUS PRN
Status: DISCONTINUED | OUTPATIENT
Start: 2019-09-30 | End: 2019-10-04 | Stop reason: HOSPADM

## 2019-09-30 RX ORDER — FUROSEMIDE 40 MG/1
1 TABLET ORAL 2 TIMES DAILY
Status: CANCELLED | OUTPATIENT
Start: 2019-09-30

## 2019-09-30 RX ORDER — CARVEDILOL 3.12 MG/1
3.12 TABLET ORAL 2 TIMES DAILY WITH MEALS
Status: DISCONTINUED | OUTPATIENT
Start: 2019-09-30 | End: 2019-10-04 | Stop reason: HOSPADM

## 2019-09-30 RX ORDER — IPRATROPIUM BROMIDE AND ALBUTEROL SULFATE 2.5; .5 MG/3ML; MG/3ML
1 SOLUTION RESPIRATORY (INHALATION)
Status: DISCONTINUED | OUTPATIENT
Start: 2019-09-30 | End: 2019-10-04 | Stop reason: HOSPADM

## 2019-09-30 RX ORDER — ATORVASTATIN CALCIUM 40 MG/1
40 TABLET, FILM COATED ORAL NIGHTLY
Status: DISCONTINUED | OUTPATIENT
Start: 2019-09-30 | End: 2019-10-04 | Stop reason: HOSPADM

## 2019-09-30 RX ORDER — LISINOPRIL 10 MG/1
10 TABLET ORAL NIGHTLY
Status: DISCONTINUED | OUTPATIENT
Start: 2019-09-30 | End: 2019-10-04 | Stop reason: HOSPADM

## 2019-09-30 RX ORDER — DEXTROSE MONOHYDRATE 50 MG/ML
100 INJECTION, SOLUTION INTRAVENOUS PRN
Status: DISCONTINUED | OUTPATIENT
Start: 2019-09-30 | End: 2019-10-04 | Stop reason: HOSPADM

## 2019-09-30 RX ORDER — LISINOPRIL 5 MG/1
2.5 TABLET ORAL DAILY
Status: DISCONTINUED | OUTPATIENT
Start: 2019-10-01 | End: 2019-10-04 | Stop reason: HOSPADM

## 2019-09-30 RX ORDER — CLOPIDOGREL BISULFATE 75 MG/1
75 TABLET ORAL DAILY
Status: DISCONTINUED | OUTPATIENT
Start: 2019-10-01 | End: 2019-10-04 | Stop reason: HOSPADM

## 2019-09-30 RX ORDER — METHYLPREDNISOLONE SODIUM SUCCINATE 125 MG/2ML
125 INJECTION, POWDER, LYOPHILIZED, FOR SOLUTION INTRAMUSCULAR; INTRAVENOUS ONCE
Status: COMPLETED | OUTPATIENT
Start: 2019-09-30 | End: 2019-09-30

## 2019-09-30 RX ORDER — FUROSEMIDE 10 MG/ML
40 INJECTION INTRAMUSCULAR; INTRAVENOUS 2 TIMES DAILY
Status: DISCONTINUED | OUTPATIENT
Start: 2019-09-30 | End: 2019-10-04 | Stop reason: HOSPADM

## 2019-09-30 RX ORDER — SODIUM CHLORIDE 0.9 % (FLUSH) 0.9 %
10 SYRINGE (ML) INJECTION EVERY 12 HOURS SCHEDULED
Status: DISCONTINUED | OUTPATIENT
Start: 2019-09-30 | End: 2019-10-04 | Stop reason: HOSPADM

## 2019-09-30 RX ORDER — ONDANSETRON 2 MG/ML
4 INJECTION INTRAMUSCULAR; INTRAVENOUS EVERY 6 HOURS PRN
Status: DISCONTINUED | OUTPATIENT
Start: 2019-09-30 | End: 2019-10-04 | Stop reason: HOSPADM

## 2019-09-30 RX ORDER — GABAPENTIN 400 MG/1
800 CAPSULE ORAL 3 TIMES DAILY
Status: DISCONTINUED | OUTPATIENT
Start: 2019-09-30 | End: 2019-10-04 | Stop reason: HOSPADM

## 2019-09-30 RX ORDER — FAMOTIDINE 20 MG/1
20 TABLET, FILM COATED ORAL 2 TIMES DAILY
Status: DISCONTINUED | OUTPATIENT
Start: 2019-09-30 | End: 2019-10-04 | Stop reason: HOSPADM

## 2019-09-30 RX ORDER — ASPIRIN 81 MG/1
81 TABLET, CHEWABLE ORAL DAILY
Status: DISCONTINUED | OUTPATIENT
Start: 2019-10-01 | End: 2019-10-04 | Stop reason: HOSPADM

## 2019-09-30 RX ORDER — SODIUM CHLORIDE 0.9 % (FLUSH) 0.9 %
10 SYRINGE (ML) INJECTION PRN
Status: DISCONTINUED | OUTPATIENT
Start: 2019-09-30 | End: 2019-10-04 | Stop reason: HOSPADM

## 2019-09-30 RX ORDER — PREDNISONE 20 MG/1
40 TABLET ORAL DAILY
Status: DISCONTINUED | OUTPATIENT
Start: 2019-09-30 | End: 2019-10-04 | Stop reason: HOSPADM

## 2019-09-30 RX ORDER — NICOTINE POLACRILEX 4 MG
15 LOZENGE BUCCAL PRN
Status: DISCONTINUED | OUTPATIENT
Start: 2019-09-30 | End: 2019-10-04 | Stop reason: HOSPADM

## 2019-09-30 RX ORDER — LEVOTHYROXINE SODIUM 0.05 MG/1
50 TABLET ORAL
Status: DISCONTINUED | OUTPATIENT
Start: 2019-10-01 | End: 2019-10-04 | Stop reason: HOSPADM

## 2019-09-30 RX ORDER — CITALOPRAM 20 MG/1
20 TABLET ORAL DAILY
Status: DISCONTINUED | OUTPATIENT
Start: 2019-10-01 | End: 2019-10-04 | Stop reason: HOSPADM

## 2019-09-30 RX ORDER — IPRATROPIUM BROMIDE AND ALBUTEROL SULFATE 2.5; .5 MG/3ML; MG/3ML
3 SOLUTION RESPIRATORY (INHALATION) ONCE
Status: COMPLETED | OUTPATIENT
Start: 2019-09-30 | End: 2019-09-30

## 2019-09-30 RX ADMIN — FUROSEMIDE 40 MG: 10 INJECTION, SOLUTION INTRAMUSCULAR; INTRAVENOUS at 18:13

## 2019-09-30 RX ADMIN — PREDNISONE 40 MG: 20 TABLET ORAL at 18:13

## 2019-09-30 RX ADMIN — FAMOTIDINE 20 MG: 20 TABLET ORAL at 20:50

## 2019-09-30 RX ADMIN — INSULIN LISPRO 2 UNITS: 100 INJECTION, SOLUTION INTRAVENOUS; SUBCUTANEOUS at 20:55

## 2019-09-30 RX ADMIN — CARVEDILOL 3.12 MG: 3.12 TABLET, FILM COATED ORAL at 18:13

## 2019-09-30 RX ADMIN — IPRATROPIUM BROMIDE AND ALBUTEROL SULFATE 3 AMPULE: .5; 3 SOLUTION RESPIRATORY (INHALATION) at 13:29

## 2019-09-30 RX ADMIN — ATORVASTATIN CALCIUM 40 MG: 40 TABLET, FILM COATED ORAL at 20:50

## 2019-09-30 RX ADMIN — LISINOPRIL 10 MG: 10 TABLET ORAL at 20:50

## 2019-09-30 RX ADMIN — IPRATROPIUM BROMIDE AND ALBUTEROL SULFATE 1 AMPULE: .5; 3 SOLUTION RESPIRATORY (INHALATION) at 20:03

## 2019-09-30 RX ADMIN — SODIUM CHLORIDE, PRESERVATIVE FREE 10 ML: 5 INJECTION INTRAVENOUS at 20:50

## 2019-09-30 RX ADMIN — MOMETASONE FUROATE AND FORMOTEROL FUMARATE DIHYDRATE 2 PUFF: 100; 5 AEROSOL RESPIRATORY (INHALATION) at 20:03

## 2019-09-30 RX ADMIN — METHYLPREDNISOLONE SODIUM SUCCINATE 125 MG: 125 INJECTION, POWDER, FOR SOLUTION INTRAMUSCULAR; INTRAVENOUS at 13:10

## 2019-09-30 RX ADMIN — INSULIN LISPRO 1 UNITS: 100 INJECTION, SOLUTION INTRAVENOUS; SUBCUTANEOUS at 18:13

## 2019-09-30 RX ADMIN — GABAPENTIN 800 MG: 400 CAPSULE ORAL at 20:50

## 2019-09-30 ASSESSMENT — ENCOUNTER SYMPTOMS
SHORTNESS OF BREATH: 1
COUGH: 1
NAUSEA: 0
VOMITING: 0
ABDOMINAL PAIN: 0

## 2019-09-30 ASSESSMENT — PAIN SCALES - GENERAL
PAINLEVEL_OUTOF10: 0

## 2019-09-30 ASSESSMENT — HEART SCORE: ECG: 0

## 2019-10-01 LAB
ANION GAP SERPL CALCULATED.3IONS-SCNC: 15 MMOL/L (ref 3–16)
BUN BLDV-MCNC: 17 MG/DL (ref 7–20)
CALCIUM SERPL-MCNC: 9.3 MG/DL (ref 8.3–10.6)
CHLORIDE BLD-SCNC: 103 MMOL/L (ref 99–110)
CHOLESTEROL, TOTAL: 259 MG/DL (ref 0–199)
CO2: 25 MMOL/L (ref 21–32)
CREAT SERPL-MCNC: 0.7 MG/DL (ref 0.6–1.2)
ESTIMATED AVERAGE GLUCOSE: 128.4 MG/DL
ESTIMATED AVERAGE GLUCOSE: 131.2 MG/DL
GFR AFRICAN AMERICAN: >60
GFR NON-AFRICAN AMERICAN: >60
GLUCOSE BLD-MCNC: 139 MG/DL (ref 70–99)
GLUCOSE BLD-MCNC: 162 MG/DL (ref 70–99)
GLUCOSE BLD-MCNC: 166 MG/DL (ref 70–99)
GLUCOSE BLD-MCNC: 289 MG/DL (ref 70–99)
GLUCOSE BLD-MCNC: 86 MG/DL (ref 70–99)
HBA1C MFR BLD: 6.1 %
HBA1C MFR BLD: 6.2 %
HCT VFR BLD CALC: 41.2 % (ref 36–48)
HDLC SERPL-MCNC: 61 MG/DL (ref 40–60)
HEMOGLOBIN: 13.6 G/DL (ref 12–16)
LDL CHOLESTEROL CALCULATED: 170 MG/DL
LV EF: 60 %
LVEF MODALITY: NORMAL
MAGNESIUM: 1.9 MG/DL (ref 1.8–2.4)
MCH RBC QN AUTO: 28.1 PG (ref 26–34)
MCHC RBC AUTO-ENTMCNC: 32.9 G/DL (ref 31–36)
MCV RBC AUTO: 85.4 FL (ref 80–100)
PDW BLD-RTO: 13.3 % (ref 12.4–15.4)
PERFORMED ON: ABNORMAL
PERFORMED ON: NORMAL
PLATELET # BLD: 309 K/UL (ref 135–450)
PMV BLD AUTO: 9.2 FL (ref 5–10.5)
POTASSIUM SERPL-SCNC: 3.7 MMOL/L (ref 3.5–5.1)
RBC # BLD: 4.83 M/UL (ref 4–5.2)
SODIUM BLD-SCNC: 143 MMOL/L (ref 136–145)
TRIGL SERPL-MCNC: 138 MG/DL (ref 0–150)
TROPONIN: <0.01 NG/ML
VLDLC SERPL CALC-MCNC: 28 MG/DL
WBC # BLD: 21.6 K/UL (ref 4–11)

## 2019-10-01 PROCEDURE — 6360000002 HC RX W HCPCS: Performed by: INTERNAL MEDICINE

## 2019-10-01 PROCEDURE — 2700000000 HC OXYGEN THERAPY PER DAY

## 2019-10-01 PROCEDURE — 94761 N-INVAS EAR/PLS OXIMETRY MLT: CPT

## 2019-10-01 PROCEDURE — 94640 AIRWAY INHALATION TREATMENT: CPT

## 2019-10-01 PROCEDURE — 6370000000 HC RX 637 (ALT 250 FOR IP): Performed by: HOSPITALIST

## 2019-10-01 PROCEDURE — 93306 TTE W/DOPPLER COMPLETE: CPT

## 2019-10-01 PROCEDURE — 6370000000 HC RX 637 (ALT 250 FOR IP): Performed by: INTERNAL MEDICINE

## 2019-10-01 PROCEDURE — 80048 BASIC METABOLIC PNL TOTAL CA: CPT

## 2019-10-01 PROCEDURE — 36415 COLL VENOUS BLD VENIPUNCTURE: CPT

## 2019-10-01 PROCEDURE — 2580000003 HC RX 258: Performed by: INTERNAL MEDICINE

## 2019-10-01 PROCEDURE — 99223 1ST HOSP IP/OBS HIGH 75: CPT | Performed by: INTERNAL MEDICINE

## 2019-10-01 PROCEDURE — 80061 LIPID PANEL: CPT

## 2019-10-01 PROCEDURE — 83735 ASSAY OF MAGNESIUM: CPT

## 2019-10-01 PROCEDURE — 85027 COMPLETE CBC AUTOMATED: CPT

## 2019-10-01 PROCEDURE — 83036 HEMOGLOBIN GLYCOSYLATED A1C: CPT

## 2019-10-01 PROCEDURE — 1200000000 HC SEMI PRIVATE

## 2019-10-01 RX ORDER — INSULIN GLARGINE 100 [IU]/ML
5 INJECTION, SOLUTION SUBCUTANEOUS NIGHTLY
Status: DISCONTINUED | OUTPATIENT
Start: 2019-10-01 | End: 2019-10-04 | Stop reason: HOSPADM

## 2019-10-01 RX ADMIN — CITALOPRAM HYDROBROMIDE 20 MG: 20 TABLET ORAL at 09:44

## 2019-10-01 RX ADMIN — ATORVASTATIN CALCIUM 40 MG: 40 TABLET, FILM COATED ORAL at 22:10

## 2019-10-01 RX ADMIN — GABAPENTIN 800 MG: 400 CAPSULE ORAL at 14:45

## 2019-10-01 RX ADMIN — INSULIN LISPRO 3 UNITS: 100 INJECTION, SOLUTION INTRAVENOUS; SUBCUTANEOUS at 17:54

## 2019-10-01 RX ADMIN — INSULIN GLARGINE 5 UNITS: 100 INJECTION, SOLUTION SUBCUTANEOUS at 22:10

## 2019-10-01 RX ADMIN — FUROSEMIDE 40 MG: 10 INJECTION, SOLUTION INTRAMUSCULAR; INTRAVENOUS at 16:17

## 2019-10-01 RX ADMIN — IPRATROPIUM BROMIDE AND ALBUTEROL SULFATE 1 AMPULE: .5; 3 SOLUTION RESPIRATORY (INHALATION) at 20:06

## 2019-10-01 RX ADMIN — CLOPIDOGREL BISULFATE 75 MG: 75 TABLET ORAL at 09:44

## 2019-10-01 RX ADMIN — LISINOPRIL 2.5 MG: 5 TABLET ORAL at 09:44

## 2019-10-01 RX ADMIN — INSULIN LISPRO 1 UNITS: 100 INJECTION, SOLUTION INTRAVENOUS; SUBCUTANEOUS at 09:53

## 2019-10-01 RX ADMIN — FAMOTIDINE 20 MG: 20 TABLET ORAL at 22:10

## 2019-10-01 RX ADMIN — IPRATROPIUM BROMIDE AND ALBUTEROL SULFATE 1 AMPULE: .5; 3 SOLUTION RESPIRATORY (INHALATION) at 15:46

## 2019-10-01 RX ADMIN — MOMETASONE FUROATE AND FORMOTEROL FUMARATE DIHYDRATE 2 PUFF: 100; 5 AEROSOL RESPIRATORY (INHALATION) at 20:06

## 2019-10-01 RX ADMIN — IPRATROPIUM BROMIDE AND ALBUTEROL SULFATE 1 AMPULE: .5; 3 SOLUTION RESPIRATORY (INHALATION) at 12:07

## 2019-10-01 RX ADMIN — LEVOTHYROXINE SODIUM 50 MCG: 50 TABLET ORAL at 05:10

## 2019-10-01 RX ADMIN — MOMETASONE FUROATE AND FORMOTEROL FUMARATE DIHYDRATE 2 PUFF: 100; 5 AEROSOL RESPIRATORY (INHALATION) at 08:08

## 2019-10-01 RX ADMIN — FUROSEMIDE 40 MG: 10 INJECTION, SOLUTION INTRAMUSCULAR; INTRAVENOUS at 09:45

## 2019-10-01 RX ADMIN — GABAPENTIN 800 MG: 400 CAPSULE ORAL at 09:44

## 2019-10-01 RX ADMIN — PREDNISONE 40 MG: 20 TABLET ORAL at 09:44

## 2019-10-01 RX ADMIN — CARVEDILOL 3.12 MG: 3.12 TABLET, FILM COATED ORAL at 09:44

## 2019-10-01 RX ADMIN — LISINOPRIL 10 MG: 10 TABLET ORAL at 22:10

## 2019-10-01 RX ADMIN — FAMOTIDINE 20 MG: 20 TABLET ORAL at 09:45

## 2019-10-01 RX ADMIN — SODIUM CHLORIDE, PRESERVATIVE FREE 10 ML: 5 INJECTION INTRAVENOUS at 22:11

## 2019-10-01 RX ADMIN — IPRATROPIUM BROMIDE AND ALBUTEROL SULFATE 1 AMPULE: .5; 3 SOLUTION RESPIRATORY (INHALATION) at 08:08

## 2019-10-01 RX ADMIN — CARVEDILOL 3.12 MG: 3.12 TABLET, FILM COATED ORAL at 16:17

## 2019-10-01 RX ADMIN — GABAPENTIN 800 MG: 400 CAPSULE ORAL at 22:10

## 2019-10-01 RX ADMIN — ASPIRIN 81 MG 81 MG: 81 TABLET ORAL at 09:44

## 2019-10-01 ASSESSMENT — PAIN SCALES - GENERAL
PAINLEVEL_OUTOF10: 0
PAINLEVEL_OUTOF10: 0
PAINLEVEL_OUTOF10: 5

## 2019-10-01 ASSESSMENT — PAIN DESCRIPTION - PROGRESSION: CLINICAL_PROGRESSION: NOT CHANGED

## 2019-10-01 ASSESSMENT — PAIN DESCRIPTION - LOCATION: LOCATION: FOOT

## 2019-10-01 ASSESSMENT — PAIN DESCRIPTION - DESCRIPTORS: DESCRIPTORS: BURNING

## 2019-10-01 ASSESSMENT — PAIN DESCRIPTION - FREQUENCY: FREQUENCY: CONTINUOUS

## 2019-10-01 ASSESSMENT — PAIN - FUNCTIONAL ASSESSMENT: PAIN_FUNCTIONAL_ASSESSMENT: ACTIVITIES ARE NOT PREVENTED

## 2019-10-01 ASSESSMENT — PAIN DESCRIPTION - PAIN TYPE: TYPE: CHRONIC PAIN

## 2019-10-01 ASSESSMENT — PAIN DESCRIPTION - ONSET: ONSET: ON-GOING

## 2019-10-01 ASSESSMENT — PAIN DESCRIPTION - ORIENTATION: ORIENTATION: RIGHT

## 2019-10-02 LAB
ANION GAP SERPL CALCULATED.3IONS-SCNC: 15 MMOL/L (ref 3–16)
BUN BLDV-MCNC: 19 MG/DL (ref 7–20)
CALCIUM SERPL-MCNC: 9.1 MG/DL (ref 8.3–10.6)
CHLORIDE BLD-SCNC: 101 MMOL/L (ref 99–110)
CO2: 29 MMOL/L (ref 21–32)
CREAT SERPL-MCNC: 0.8 MG/DL (ref 0.6–1.2)
GFR AFRICAN AMERICAN: >60
GFR NON-AFRICAN AMERICAN: >60
GLUCOSE BLD-MCNC: 100 MG/DL (ref 70–99)
GLUCOSE BLD-MCNC: 108 MG/DL (ref 70–99)
GLUCOSE BLD-MCNC: 125 MG/DL (ref 70–99)
GLUCOSE BLD-MCNC: 127 MG/DL (ref 70–99)
GLUCOSE BLD-MCNC: 132 MG/DL (ref 70–99)
GLUCOSE BLD-MCNC: 158 MG/DL (ref 70–99)
HCT VFR BLD CALC: 38.1 % (ref 36–48)
HEMOGLOBIN: 12.4 G/DL (ref 12–16)
MAGNESIUM: 1.9 MG/DL (ref 1.8–2.4)
MCH RBC QN AUTO: 28.1 PG (ref 26–34)
MCHC RBC AUTO-ENTMCNC: 32.6 G/DL (ref 31–36)
MCV RBC AUTO: 86.3 FL (ref 80–100)
PDW BLD-RTO: 13.4 % (ref 12.4–15.4)
PERFORMED ON: ABNORMAL
PLATELET # BLD: 308 K/UL (ref 135–450)
PMV BLD AUTO: 9.1 FL (ref 5–10.5)
POTASSIUM SERPL-SCNC: 3.7 MMOL/L (ref 3.5–5.1)
PRO-BNP: 330 PG/ML (ref 0–124)
RBC # BLD: 4.41 M/UL (ref 4–5.2)
SODIUM BLD-SCNC: 145 MMOL/L (ref 136–145)
WBC # BLD: 20.7 K/UL (ref 4–11)

## 2019-10-02 PROCEDURE — 6370000000 HC RX 637 (ALT 250 FOR IP): Performed by: INTERNAL MEDICINE

## 2019-10-02 PROCEDURE — 94760 N-INVAS EAR/PLS OXIMETRY 1: CPT

## 2019-10-02 PROCEDURE — 6370000000 HC RX 637 (ALT 250 FOR IP): Performed by: HOSPITALIST

## 2019-10-02 PROCEDURE — 2580000003 HC RX 258: Performed by: INTERNAL MEDICINE

## 2019-10-02 PROCEDURE — 99232 SBSQ HOSP IP/OBS MODERATE 35: CPT | Performed by: INTERNAL MEDICINE

## 2019-10-02 PROCEDURE — 2700000000 HC OXYGEN THERAPY PER DAY

## 2019-10-02 PROCEDURE — 1200000000 HC SEMI PRIVATE

## 2019-10-02 PROCEDURE — 83735 ASSAY OF MAGNESIUM: CPT

## 2019-10-02 PROCEDURE — 83880 ASSAY OF NATRIURETIC PEPTIDE: CPT

## 2019-10-02 PROCEDURE — 94640 AIRWAY INHALATION TREATMENT: CPT

## 2019-10-02 PROCEDURE — 80048 BASIC METABOLIC PNL TOTAL CA: CPT

## 2019-10-02 PROCEDURE — 36415 COLL VENOUS BLD VENIPUNCTURE: CPT

## 2019-10-02 PROCEDURE — 6360000002 HC RX W HCPCS: Performed by: INTERNAL MEDICINE

## 2019-10-02 PROCEDURE — 85027 COMPLETE CBC AUTOMATED: CPT

## 2019-10-02 RX ADMIN — FUROSEMIDE 40 MG: 10 INJECTION, SOLUTION INTRAMUSCULAR; INTRAVENOUS at 16:41

## 2019-10-02 RX ADMIN — GABAPENTIN 800 MG: 400 CAPSULE ORAL at 12:37

## 2019-10-02 RX ADMIN — IPRATROPIUM BROMIDE AND ALBUTEROL SULFATE 1 AMPULE: .5; 3 SOLUTION RESPIRATORY (INHALATION) at 16:34

## 2019-10-02 RX ADMIN — IPRATROPIUM BROMIDE AND ALBUTEROL SULFATE 1 AMPULE: .5; 3 SOLUTION RESPIRATORY (INHALATION) at 11:51

## 2019-10-02 RX ADMIN — MOMETASONE FUROATE AND FORMOTEROL FUMARATE DIHYDRATE 2 PUFF: 100; 5 AEROSOL RESPIRATORY (INHALATION) at 08:26

## 2019-10-02 RX ADMIN — LISINOPRIL 2.5 MG: 5 TABLET ORAL at 08:24

## 2019-10-02 RX ADMIN — CARVEDILOL 3.12 MG: 3.12 TABLET, FILM COATED ORAL at 16:41

## 2019-10-02 RX ADMIN — PREDNISONE 40 MG: 20 TABLET ORAL at 08:24

## 2019-10-02 RX ADMIN — INSULIN GLARGINE 5 UNITS: 100 INJECTION, SOLUTION SUBCUTANEOUS at 22:22

## 2019-10-02 RX ADMIN — FUROSEMIDE 40 MG: 10 INJECTION, SOLUTION INTRAMUSCULAR; INTRAVENOUS at 08:24

## 2019-10-02 RX ADMIN — MOMETASONE FUROATE AND FORMOTEROL FUMARATE DIHYDRATE 2 PUFF: 100; 5 AEROSOL RESPIRATORY (INHALATION) at 20:13

## 2019-10-02 RX ADMIN — GABAPENTIN 800 MG: 400 CAPSULE ORAL at 22:22

## 2019-10-02 RX ADMIN — FAMOTIDINE 20 MG: 20 TABLET ORAL at 08:25

## 2019-10-02 RX ADMIN — ATORVASTATIN CALCIUM 40 MG: 40 TABLET, FILM COATED ORAL at 22:22

## 2019-10-02 RX ADMIN — FAMOTIDINE 20 MG: 20 TABLET ORAL at 22:29

## 2019-10-02 RX ADMIN — CARVEDILOL 3.12 MG: 3.12 TABLET, FILM COATED ORAL at 08:24

## 2019-10-02 RX ADMIN — LISINOPRIL 10 MG: 10 TABLET ORAL at 22:22

## 2019-10-02 RX ADMIN — ASPIRIN 81 MG 81 MG: 81 TABLET ORAL at 08:25

## 2019-10-02 RX ADMIN — GABAPENTIN 800 MG: 400 CAPSULE ORAL at 08:24

## 2019-10-02 RX ADMIN — SODIUM CHLORIDE, PRESERVATIVE FREE 10 ML: 5 INJECTION INTRAVENOUS at 22:23

## 2019-10-02 RX ADMIN — CITALOPRAM HYDROBROMIDE 20 MG: 20 TABLET ORAL at 08:24

## 2019-10-02 RX ADMIN — CLOPIDOGREL BISULFATE 75 MG: 75 TABLET ORAL at 08:25

## 2019-10-02 RX ADMIN — IPRATROPIUM BROMIDE AND ALBUTEROL SULFATE 1 AMPULE: .5; 3 SOLUTION RESPIRATORY (INHALATION) at 20:13

## 2019-10-02 RX ADMIN — INSULIN LISPRO 1 UNITS: 100 INJECTION, SOLUTION INTRAVENOUS; SUBCUTANEOUS at 22:22

## 2019-10-02 RX ADMIN — LEVOTHYROXINE SODIUM 50 MCG: 50 TABLET ORAL at 05:44

## 2019-10-02 RX ADMIN — IPRATROPIUM BROMIDE AND ALBUTEROL SULFATE 1 AMPULE: .5; 3 SOLUTION RESPIRATORY (INHALATION) at 06:15

## 2019-10-02 ASSESSMENT — PAIN SCALES - GENERAL
PAINLEVEL_OUTOF10: 0

## 2019-10-03 LAB
ANION GAP SERPL CALCULATED.3IONS-SCNC: 14 MMOL/L (ref 3–16)
BUN BLDV-MCNC: 20 MG/DL (ref 7–20)
CALCIUM SERPL-MCNC: 9.2 MG/DL (ref 8.3–10.6)
CHLORIDE BLD-SCNC: 101 MMOL/L (ref 99–110)
CO2: 29 MMOL/L (ref 21–32)
CREAT SERPL-MCNC: 0.8 MG/DL (ref 0.6–1.2)
GFR AFRICAN AMERICAN: >60
GFR NON-AFRICAN AMERICAN: >60
GLUCOSE BLD-MCNC: 126 MG/DL (ref 70–99)
GLUCOSE BLD-MCNC: 149 MG/DL (ref 70–99)
GLUCOSE BLD-MCNC: 155 MG/DL (ref 70–99)
GLUCOSE BLD-MCNC: 88 MG/DL (ref 70–99)
GLUCOSE BLD-MCNC: 96 MG/DL (ref 70–99)
GLUCOSE BLD-MCNC: 98 MG/DL (ref 70–99)
MAGNESIUM: 2 MG/DL (ref 1.8–2.4)
PERFORMED ON: ABNORMAL
PERFORMED ON: NORMAL
PERFORMED ON: NORMAL
POTASSIUM SERPL-SCNC: 3.6 MMOL/L (ref 3.5–5.1)
SODIUM BLD-SCNC: 144 MMOL/L (ref 136–145)

## 2019-10-03 PROCEDURE — 1200000000 HC SEMI PRIVATE

## 2019-10-03 PROCEDURE — 6370000000 HC RX 637 (ALT 250 FOR IP): Performed by: HOSPITALIST

## 2019-10-03 PROCEDURE — 6370000000 HC RX 637 (ALT 250 FOR IP): Performed by: INTERNAL MEDICINE

## 2019-10-03 PROCEDURE — 83735 ASSAY OF MAGNESIUM: CPT

## 2019-10-03 PROCEDURE — 80048 BASIC METABOLIC PNL TOTAL CA: CPT

## 2019-10-03 PROCEDURE — 94640 AIRWAY INHALATION TREATMENT: CPT

## 2019-10-03 PROCEDURE — 94761 N-INVAS EAR/PLS OXIMETRY MLT: CPT

## 2019-10-03 PROCEDURE — 2580000003 HC RX 258: Performed by: INTERNAL MEDICINE

## 2019-10-03 PROCEDURE — 2700000000 HC OXYGEN THERAPY PER DAY

## 2019-10-03 PROCEDURE — 6360000002 HC RX W HCPCS: Performed by: INTERNAL MEDICINE

## 2019-10-03 PROCEDURE — 36415 COLL VENOUS BLD VENIPUNCTURE: CPT

## 2019-10-03 RX ADMIN — SODIUM CHLORIDE, PRESERVATIVE FREE 10 ML: 5 INJECTION INTRAVENOUS at 21:35

## 2019-10-03 RX ADMIN — CARVEDILOL 3.12 MG: 3.12 TABLET, FILM COATED ORAL at 08:01

## 2019-10-03 RX ADMIN — IPRATROPIUM BROMIDE AND ALBUTEROL SULFATE 1 AMPULE: .5; 3 SOLUTION RESPIRATORY (INHALATION) at 08:27

## 2019-10-03 RX ADMIN — INSULIN LISPRO 1 UNITS: 100 INJECTION, SOLUTION INTRAVENOUS; SUBCUTANEOUS at 21:36

## 2019-10-03 RX ADMIN — GABAPENTIN 800 MG: 400 CAPSULE ORAL at 08:01

## 2019-10-03 RX ADMIN — FAMOTIDINE 20 MG: 20 TABLET ORAL at 08:00

## 2019-10-03 RX ADMIN — PREDNISONE 40 MG: 20 TABLET ORAL at 08:00

## 2019-10-03 RX ADMIN — MOMETASONE FUROATE AND FORMOTEROL FUMARATE DIHYDRATE 2 PUFF: 100; 5 AEROSOL RESPIRATORY (INHALATION) at 20:27

## 2019-10-03 RX ADMIN — FUROSEMIDE 40 MG: 10 INJECTION, SOLUTION INTRAMUSCULAR; INTRAVENOUS at 17:14

## 2019-10-03 RX ADMIN — LISINOPRIL 10 MG: 10 TABLET ORAL at 21:36

## 2019-10-03 RX ADMIN — CLOPIDOGREL BISULFATE 75 MG: 75 TABLET ORAL at 08:00

## 2019-10-03 RX ADMIN — IPRATROPIUM BROMIDE AND ALBUTEROL SULFATE 1 AMPULE: .5; 3 SOLUTION RESPIRATORY (INHALATION) at 20:26

## 2019-10-03 RX ADMIN — GABAPENTIN 800 MG: 400 CAPSULE ORAL at 21:35

## 2019-10-03 RX ADMIN — LEVOTHYROXINE SODIUM 50 MCG: 50 TABLET ORAL at 06:08

## 2019-10-03 RX ADMIN — FUROSEMIDE 40 MG: 10 INJECTION, SOLUTION INTRAMUSCULAR; INTRAVENOUS at 07:58

## 2019-10-03 RX ADMIN — MOMETASONE FUROATE AND FORMOTEROL FUMARATE DIHYDRATE 2 PUFF: 100; 5 AEROSOL RESPIRATORY (INHALATION) at 08:27

## 2019-10-03 RX ADMIN — ATORVASTATIN CALCIUM 40 MG: 40 TABLET, FILM COATED ORAL at 21:35

## 2019-10-03 RX ADMIN — GABAPENTIN 800 MG: 400 CAPSULE ORAL at 14:22

## 2019-10-03 RX ADMIN — CITALOPRAM HYDROBROMIDE 20 MG: 20 TABLET ORAL at 08:00

## 2019-10-03 RX ADMIN — FAMOTIDINE 20 MG: 20 TABLET ORAL at 21:36

## 2019-10-03 RX ADMIN — IPRATROPIUM BROMIDE AND ALBUTEROL SULFATE 1 AMPULE: .5; 3 SOLUTION RESPIRATORY (INHALATION) at 15:46

## 2019-10-03 RX ADMIN — INSULIN LISPRO 1 UNITS: 100 INJECTION, SOLUTION INTRAVENOUS; SUBCUTANEOUS at 17:14

## 2019-10-03 RX ADMIN — INSULIN GLARGINE 5 UNITS: 100 INJECTION, SOLUTION SUBCUTANEOUS at 21:36

## 2019-10-03 RX ADMIN — ASPIRIN 81 MG 81 MG: 81 TABLET ORAL at 08:00

## 2019-10-03 RX ADMIN — CARVEDILOL 3.12 MG: 3.12 TABLET, FILM COATED ORAL at 17:14

## 2019-10-03 RX ADMIN — LISINOPRIL 2.5 MG: 5 TABLET ORAL at 08:00

## 2019-10-03 RX ADMIN — IPRATROPIUM BROMIDE AND ALBUTEROL SULFATE 1 AMPULE: .5; 3 SOLUTION RESPIRATORY (INHALATION) at 12:07

## 2019-10-03 RX ADMIN — SODIUM CHLORIDE, PRESERVATIVE FREE 10 ML: 5 INJECTION INTRAVENOUS at 08:01

## 2019-10-03 ASSESSMENT — PAIN SCALES - GENERAL
PAINLEVEL_OUTOF10: 0
PAINLEVEL_OUTOF10: 0

## 2019-10-04 VITALS
BODY MASS INDEX: 44.18 KG/M2 | TEMPERATURE: 97.5 F | SYSTOLIC BLOOD PRESSURE: 103 MMHG | HEART RATE: 63 BPM | HEIGHT: 59 IN | WEIGHT: 219.14 LBS | OXYGEN SATURATION: 95 % | DIASTOLIC BLOOD PRESSURE: 47 MMHG | RESPIRATION RATE: 16 BRPM

## 2019-10-04 LAB
ANION GAP SERPL CALCULATED.3IONS-SCNC: 11 MMOL/L (ref 3–16)
BUN BLDV-MCNC: 22 MG/DL (ref 7–20)
CALCIUM SERPL-MCNC: 9.1 MG/DL (ref 8.3–10.6)
CHLORIDE BLD-SCNC: 100 MMOL/L (ref 99–110)
CO2: 32 MMOL/L (ref 21–32)
CREAT SERPL-MCNC: 0.8 MG/DL (ref 0.6–1.2)
GFR AFRICAN AMERICAN: >60
GFR NON-AFRICAN AMERICAN: >60
GLUCOSE BLD-MCNC: 127 MG/DL (ref 70–99)
GLUCOSE BLD-MCNC: 83 MG/DL (ref 70–99)
GLUCOSE BLD-MCNC: 89 MG/DL (ref 70–99)
LV EF: 84 %
LVEF MODALITY: NORMAL
MAGNESIUM: 2 MG/DL (ref 1.8–2.4)
PERFORMED ON: ABNORMAL
PERFORMED ON: NORMAL
POTASSIUM SERPL-SCNC: 3.5 MMOL/L (ref 3.5–5.1)
SODIUM BLD-SCNC: 143 MMOL/L (ref 136–145)

## 2019-10-04 PROCEDURE — 94640 AIRWAY INHALATION TREATMENT: CPT

## 2019-10-04 PROCEDURE — 3430000000 HC RX DIAGNOSTIC RADIOPHARMACEUTICAL: Performed by: INTERNAL MEDICINE

## 2019-10-04 PROCEDURE — 6360000002 HC RX W HCPCS: Performed by: HOSPITALIST

## 2019-10-04 PROCEDURE — 78452 HT MUSCLE IMAGE SPECT MULT: CPT

## 2019-10-04 PROCEDURE — 36415 COLL VENOUS BLD VENIPUNCTURE: CPT

## 2019-10-04 PROCEDURE — A9502 TC99M TETROFOSMIN: HCPCS | Performed by: INTERNAL MEDICINE

## 2019-10-04 PROCEDURE — 80048 BASIC METABOLIC PNL TOTAL CA: CPT

## 2019-10-04 PROCEDURE — 6370000000 HC RX 637 (ALT 250 FOR IP): Performed by: INTERNAL MEDICINE

## 2019-10-04 PROCEDURE — 93017 CV STRESS TEST TRACING ONLY: CPT

## 2019-10-04 PROCEDURE — 2700000000 HC OXYGEN THERAPY PER DAY

## 2019-10-04 PROCEDURE — 94760 N-INVAS EAR/PLS OXIMETRY 1: CPT

## 2019-10-04 PROCEDURE — 6360000002 HC RX W HCPCS: Performed by: INTERNAL MEDICINE

## 2019-10-04 PROCEDURE — A9502 TC99M TETROFOSMIN: HCPCS | Performed by: HOSPITALIST

## 2019-10-04 PROCEDURE — 3430000000 HC RX DIAGNOSTIC RADIOPHARMACEUTICAL: Performed by: HOSPITALIST

## 2019-10-04 PROCEDURE — 99232 SBSQ HOSP IP/OBS MODERATE 35: CPT | Performed by: NURSE PRACTITIONER

## 2019-10-04 PROCEDURE — 83735 ASSAY OF MAGNESIUM: CPT

## 2019-10-04 PROCEDURE — 2580000003 HC RX 258: Performed by: INTERNAL MEDICINE

## 2019-10-04 RX ORDER — PREDNISONE 10 MG/1
TABLET ORAL
Qty: 20 TABLET | Refills: 0 | Status: SHIPPED | OUTPATIENT
Start: 2019-10-04 | End: 2019-10-29

## 2019-10-04 RX ADMIN — CLOPIDOGREL BISULFATE 75 MG: 75 TABLET ORAL at 10:38

## 2019-10-04 RX ADMIN — MOMETASONE FUROATE AND FORMOTEROL FUMARATE DIHYDRATE 2 PUFF: 100; 5 AEROSOL RESPIRATORY (INHALATION) at 07:26

## 2019-10-04 RX ADMIN — GABAPENTIN 800 MG: 400 CAPSULE ORAL at 13:51

## 2019-10-04 RX ADMIN — CITALOPRAM HYDROBROMIDE 20 MG: 20 TABLET ORAL at 10:38

## 2019-10-04 RX ADMIN — FAMOTIDINE 20 MG: 20 TABLET ORAL at 10:38

## 2019-10-04 RX ADMIN — IPRATROPIUM BROMIDE AND ALBUTEROL SULFATE 1 AMPULE: .5; 3 SOLUTION RESPIRATORY (INHALATION) at 16:20

## 2019-10-04 RX ADMIN — IPRATROPIUM BROMIDE AND ALBUTEROL SULFATE 1 AMPULE: .5; 3 SOLUTION RESPIRATORY (INHALATION) at 12:06

## 2019-10-04 RX ADMIN — TETROFOSMIN 30 MILLICURIE: 1.38 INJECTION, POWDER, LYOPHILIZED, FOR SOLUTION INTRAVENOUS at 09:27

## 2019-10-04 RX ADMIN — REGADENOSON 0.4 MG: 0.08 INJECTION, SOLUTION INTRAVENOUS at 09:26

## 2019-10-04 RX ADMIN — CARVEDILOL 3.12 MG: 3.12 TABLET, FILM COATED ORAL at 10:38

## 2019-10-04 RX ADMIN — GABAPENTIN 800 MG: 400 CAPSULE ORAL at 10:38

## 2019-10-04 RX ADMIN — PREDNISONE 40 MG: 20 TABLET ORAL at 10:38

## 2019-10-04 RX ADMIN — FUROSEMIDE 40 MG: 10 INJECTION, SOLUTION INTRAMUSCULAR; INTRAVENOUS at 10:37

## 2019-10-04 RX ADMIN — IPRATROPIUM BROMIDE AND ALBUTEROL SULFATE 1 AMPULE: .5; 3 SOLUTION RESPIRATORY (INHALATION) at 07:26

## 2019-10-04 RX ADMIN — ASPIRIN 81 MG 81 MG: 81 TABLET ORAL at 10:38

## 2019-10-04 RX ADMIN — SODIUM CHLORIDE, PRESERVATIVE FREE 10 ML: 5 INJECTION INTRAVENOUS at 10:39

## 2019-10-04 RX ADMIN — TETROFOSMIN 10 MILLICURIE: 1.38 INJECTION, POWDER, LYOPHILIZED, FOR SOLUTION INTRAVENOUS at 07:04

## 2019-10-04 RX ADMIN — LISINOPRIL 2.5 MG: 5 TABLET ORAL at 10:38

## 2019-10-04 RX ADMIN — LEVOTHYROXINE SODIUM 50 MCG: 50 TABLET ORAL at 05:07

## 2019-10-04 ASSESSMENT — PAIN SCALES - GENERAL: PAINLEVEL_OUTOF10: 0

## 2019-10-09 DIAGNOSIS — I25.10 CORONARY ARTERY DISEASE INVOLVING NATIVE CORONARY ARTERY OF NATIVE HEART WITHOUT ANGINA PECTORIS: ICD-10-CM

## 2019-10-09 DIAGNOSIS — I10 ESSENTIAL HYPERTENSION: ICD-10-CM

## 2019-10-09 RX ORDER — NITROGLYCERIN 0.4 MG/1
0.4 TABLET SUBLINGUAL EVERY 5 MIN PRN
Qty: 50 TABLET | Refills: 1 | Status: ON HOLD | OUTPATIENT
Start: 2019-10-09 | End: 2021-03-02 | Stop reason: HOSPADM

## 2019-10-09 RX ORDER — FUROSEMIDE 40 MG/1
TABLET ORAL
Qty: 60 TABLET | Refills: 2 | Status: SHIPPED | OUTPATIENT
Start: 2019-10-09 | End: 2020-05-14

## 2019-10-09 RX ORDER — FUROSEMIDE 40 MG/1
TABLET ORAL
Qty: 30 TABLET | Refills: 0 | Status: SHIPPED | OUTPATIENT
Start: 2019-10-09 | End: 2019-10-29

## 2019-10-15 ENCOUNTER — TELEPHONE (OUTPATIENT)
Dept: INTERNAL MEDICINE CLINIC | Age: 64
End: 2019-10-15

## 2019-10-15 ENCOUNTER — TELEPHONE (OUTPATIENT)
Dept: CARDIOLOGY CLINIC | Age: 64
End: 2019-10-15

## 2019-10-29 ENCOUNTER — TELEPHONE (OUTPATIENT)
Dept: CARDIOLOGY CLINIC | Age: 64
End: 2019-10-29

## 2019-10-29 ENCOUNTER — OFFICE VISIT (OUTPATIENT)
Dept: INTERNAL MEDICINE CLINIC | Age: 64
End: 2019-10-29
Payer: COMMERCIAL

## 2019-10-29 VITALS
BODY MASS INDEX: 44.61 KG/M2 | DIASTOLIC BLOOD PRESSURE: 81 MMHG | HEART RATE: 96 BPM | SYSTOLIC BLOOD PRESSURE: 122 MMHG | WEIGHT: 221 LBS | RESPIRATION RATE: 14 BRPM | OXYGEN SATURATION: 99 %

## 2019-10-29 DIAGNOSIS — Z09 HOSPITAL DISCHARGE FOLLOW-UP: ICD-10-CM

## 2019-10-29 DIAGNOSIS — J44.1 COPD EXACERBATION (HCC): Primary | Chronic | ICD-10-CM

## 2019-10-29 PROCEDURE — G8482 FLU IMMUNIZE ORDER/ADMIN: HCPCS | Performed by: INTERNAL MEDICINE

## 2019-10-29 PROCEDURE — G8598 ASA/ANTIPLAT THER USED: HCPCS | Performed by: INTERNAL MEDICINE

## 2019-10-29 PROCEDURE — G8417 CALC BMI ABV UP PARAM F/U: HCPCS | Performed by: INTERNAL MEDICINE

## 2019-10-29 PROCEDURE — G0008 ADMIN INFLUENZA VIRUS VAC: HCPCS | Performed by: INTERNAL MEDICINE

## 2019-10-29 PROCEDURE — 3023F SPIROM DOC REV: CPT | Performed by: INTERNAL MEDICINE

## 2019-10-29 PROCEDURE — G8427 DOCREV CUR MEDS BY ELIG CLIN: HCPCS | Performed by: INTERNAL MEDICINE

## 2019-10-29 PROCEDURE — 1111F DSCHRG MED/CURRENT MED MERGE: CPT | Performed by: INTERNAL MEDICINE

## 2019-10-29 PROCEDURE — 90686 IIV4 VACC NO PRSV 0.5 ML IM: CPT | Performed by: INTERNAL MEDICINE

## 2019-10-29 PROCEDURE — 99214 OFFICE O/P EST MOD 30 MIN: CPT | Performed by: INTERNAL MEDICINE

## 2019-10-29 PROCEDURE — 4004F PT TOBACCO SCREEN RCVD TLK: CPT | Performed by: INTERNAL MEDICINE

## 2019-10-29 PROCEDURE — 3017F COLORECTAL CA SCREEN DOC REV: CPT | Performed by: INTERNAL MEDICINE

## 2019-10-29 PROCEDURE — G8926 SPIRO NO PERF OR DOC: HCPCS | Performed by: INTERNAL MEDICINE

## 2019-11-01 DIAGNOSIS — J44.9 CHRONIC OBSTRUCTIVE PULMONARY DISEASE, UNSPECIFIED COPD TYPE (HCC): Primary | ICD-10-CM

## 2019-12-23 ENCOUNTER — TELEPHONE (OUTPATIENT)
Dept: PULMONOLOGY | Age: 64
End: 2019-12-23

## 2020-02-17 RX ORDER — LISINOPRIL 10 MG/1
10 TABLET ORAL NIGHTLY
Qty: 90 TABLET | Refills: 2 | OUTPATIENT
Start: 2020-02-17

## 2020-02-21 RX ORDER — LISINOPRIL 10 MG/1
10 TABLET ORAL NIGHTLY
Qty: 90 TABLET | Refills: 2 | OUTPATIENT
Start: 2020-02-21

## 2020-02-21 RX ORDER — AMLODIPINE BESYLATE 5 MG/1
TABLET ORAL
Qty: 30 TABLET | Refills: 5 | Status: SHIPPED | OUTPATIENT
Start: 2020-02-21 | End: 2020-12-14

## 2020-02-21 RX ORDER — CARVEDILOL 3.12 MG/1
TABLET ORAL
Qty: 30 TABLET | Refills: 5 | Status: SHIPPED | OUTPATIENT
Start: 2020-02-21 | End: 2020-09-29

## 2020-02-25 RX ORDER — DILTIAZEM HYDROCHLORIDE 60 MG/1
2 TABLET, FILM COATED ORAL 2 TIMES DAILY
Qty: 10.2 INHALER | OUTPATIENT
Start: 2020-02-25

## 2020-03-13 ENCOUNTER — APPOINTMENT (OUTPATIENT)
Dept: GENERAL RADIOLOGY | Age: 65
End: 2020-03-13
Payer: COMMERCIAL

## 2020-03-13 ENCOUNTER — HOSPITAL ENCOUNTER (EMERGENCY)
Age: 65
Discharge: HOME | End: 2020-03-14
Attending: EMERGENCY MEDICINE
Payer: COMMERCIAL

## 2020-03-13 VITALS
BODY MASS INDEX: 45.38 KG/M2 | SYSTOLIC BLOOD PRESSURE: 142 MMHG | RESPIRATION RATE: 17 BRPM | DIASTOLIC BLOOD PRESSURE: 85 MMHG | HEIGHT: 59 IN | HEART RATE: 72 BPM | OXYGEN SATURATION: 95 % | WEIGHT: 225.09 LBS | TEMPERATURE: 97.3 F

## 2020-03-13 LAB
ANION GAP SERPL CALCULATED.3IONS-SCNC: 11 MMOL/L (ref 3–16)
BASE EXCESS VENOUS: 1.9 MMOL/L
BASOPHILS ABSOLUTE: 0.1 K/UL (ref 0–0.2)
BASOPHILS RELATIVE PERCENT: 0.9 %
BUN BLDV-MCNC: 14 MG/DL (ref 7–20)
CALCIUM SERPL-MCNC: 9.7 MG/DL (ref 8.3–10.6)
CARBOXYHEMOGLOBIN: 1.2 %
CHLORIDE BLD-SCNC: 104 MMOL/L (ref 99–110)
CO2: 26 MMOL/L (ref 21–32)
CREAT SERPL-MCNC: 0.6 MG/DL (ref 0.6–1.2)
EOSINOPHILS ABSOLUTE: 0.2 K/UL (ref 0–0.6)
EOSINOPHILS RELATIVE PERCENT: 2.1 %
GFR AFRICAN AMERICAN: >60
GFR NON-AFRICAN AMERICAN: >60
GLUCOSE BLD-MCNC: 95 MG/DL (ref 70–99)
HCO3 VENOUS: 28 MMOL/L (ref 23–29)
HCT VFR BLD CALC: 38.7 % (ref 36–48)
HEMOGLOBIN: 13 G/DL (ref 12–16)
LACTIC ACID: 1 MMOL/L (ref 0.4–2)
LYMPHOCYTES ABSOLUTE: 2.1 K/UL (ref 1–5.1)
LYMPHOCYTES RELATIVE PERCENT: 30 %
MCH RBC QN AUTO: 28.7 PG (ref 26–34)
MCHC RBC AUTO-ENTMCNC: 33.6 G/DL (ref 31–36)
MCV RBC AUTO: 85.6 FL (ref 80–100)
METHEMOGLOBIN VENOUS: 0.2 %
MONOCYTES ABSOLUTE: 0.6 K/UL (ref 0–1.3)
MONOCYTES RELATIVE PERCENT: 7.9 %
NEUTROPHILS ABSOLUTE: 4.2 K/UL (ref 1.7–7.7)
NEUTROPHILS RELATIVE PERCENT: 59.1 %
O2 CONTENT, VEN: 11 ML/DL
O2 SAT, VEN: 58 %
O2 THERAPY: NORMAL
PCO2, VEN: 47.1 MMHG (ref 40–50)
PDW BLD-RTO: 13.6 % (ref 12.4–15.4)
PH VENOUS: 7.38 (ref 7.35–7.45)
PLATELET # BLD: 259 K/UL (ref 135–450)
PMV BLD AUTO: 9.3 FL (ref 5–10.5)
PO2, VEN: 30 MMHG
POTASSIUM REFLEX MAGNESIUM: 4.3 MMOL/L (ref 3.5–5.1)
PRO-BNP: 116 PG/ML (ref 0–124)
RAPID INFLUENZA  B AGN: NEGATIVE
RAPID INFLUENZA A AGN: NEGATIVE
RBC # BLD: 4.52 M/UL (ref 4–5.2)
SODIUM BLD-SCNC: 141 MMOL/L (ref 136–145)
TCO2 CALC VENOUS: 29 MMOL/L
TROPONIN: <0.01 NG/ML
TROPONIN: <0.01 NG/ML
WBC # BLD: 7.1 K/UL (ref 4–11)

## 2020-03-13 PROCEDURE — 82803 BLOOD GASES ANY COMBINATION: CPT

## 2020-03-13 PROCEDURE — 6370000000 HC RX 637 (ALT 250 FOR IP): Performed by: PHYSICIAN ASSISTANT

## 2020-03-13 PROCEDURE — 83880 ASSAY OF NATRIURETIC PEPTIDE: CPT

## 2020-03-13 PROCEDURE — 83605 ASSAY OF LACTIC ACID: CPT

## 2020-03-13 PROCEDURE — 85025 COMPLETE CBC W/AUTO DIFF WBC: CPT

## 2020-03-13 PROCEDURE — 94640 AIRWAY INHALATION TREATMENT: CPT

## 2020-03-13 PROCEDURE — 80048 BASIC METABOLIC PNL TOTAL CA: CPT

## 2020-03-13 PROCEDURE — 94761 N-INVAS EAR/PLS OXIMETRY MLT: CPT

## 2020-03-13 PROCEDURE — 93005 ELECTROCARDIOGRAM TRACING: CPT | Performed by: EMERGENCY MEDICINE

## 2020-03-13 PROCEDURE — 87804 INFLUENZA ASSAY W/OPTIC: CPT

## 2020-03-13 PROCEDURE — 84484 ASSAY OF TROPONIN QUANT: CPT

## 2020-03-13 PROCEDURE — 71046 X-RAY EXAM CHEST 2 VIEWS: CPT

## 2020-03-13 PROCEDURE — 36415 COLL VENOUS BLD VENIPUNCTURE: CPT

## 2020-03-13 PROCEDURE — 99285 EMERGENCY DEPT VISIT HI MDM: CPT

## 2020-03-13 RX ORDER — FUROSEMIDE 20 MG/1
20 TABLET ORAL DAILY
Qty: 7 TABLET | Refills: 0 | Status: SHIPPED | OUTPATIENT
Start: 2020-03-13 | End: 2020-08-12 | Stop reason: DRUGHIGH

## 2020-03-13 RX ORDER — IPRATROPIUM BROMIDE AND ALBUTEROL SULFATE 2.5; .5 MG/3ML; MG/3ML
1 SOLUTION RESPIRATORY (INHALATION) ONCE
Status: COMPLETED | OUTPATIENT
Start: 2020-03-13 | End: 2020-03-13

## 2020-03-13 RX ORDER — GABAPENTIN 400 MG/1
800 CAPSULE ORAL ONCE
Status: COMPLETED | OUTPATIENT
Start: 2020-03-13 | End: 2020-03-13

## 2020-03-13 RX ADMIN — IPRATROPIUM BROMIDE AND ALBUTEROL SULFATE 1 AMPULE: .5; 3 SOLUTION RESPIRATORY (INHALATION) at 17:23

## 2020-03-13 RX ADMIN — GABAPENTIN 800 MG: 400 CAPSULE ORAL at 16:51

## 2020-03-13 ASSESSMENT — PAIN DESCRIPTION - PAIN TYPE: TYPE: ACUTE PAIN

## 2020-03-13 ASSESSMENT — PAIN DESCRIPTION - FREQUENCY: FREQUENCY: CONTINUOUS

## 2020-03-13 ASSESSMENT — PAIN DESCRIPTION - PROGRESSION: CLINICAL_PROGRESSION: GRADUALLY WORSENING

## 2020-03-13 ASSESSMENT — PAIN SCALES - GENERAL: PAINLEVEL_OUTOF10: 7

## 2020-03-13 ASSESSMENT — PAIN DESCRIPTION - ONSET: ONSET: GRADUAL

## 2020-03-13 ASSESSMENT — PAIN - FUNCTIONAL ASSESSMENT: PAIN_FUNCTIONAL_ASSESSMENT: PREVENTS OR INTERFERES SOME ACTIVE ACTIVITIES AND ADLS

## 2020-03-13 ASSESSMENT — PAIN DESCRIPTION - ORIENTATION: ORIENTATION: RIGHT;LEFT

## 2020-03-13 ASSESSMENT — PAIN DESCRIPTION - LOCATION: LOCATION: LEG

## 2020-03-13 ASSESSMENT — PAIN DESCRIPTION - DESCRIPTORS: DESCRIPTORS: BURNING;THROBBING

## 2020-03-13 NOTE — ED PROVIDER NOTES
[address]      Pt Name: Juventino Howard  MRN: [id number]  [place] 1955  Date of evaluation: 3/13/2020  Provider: SANA Weaver    Shared Visit or Autonomous Visit:  I have seen and evaluated this patient with my supervising physician Darian Asher MD.      [address] [place] [address]       Chief Complaint   Patient presents with    Shortness of Breath     for 1 week       HISTORY OF PRESENT ILLNESS  (Location/Symptom, Timing/Onset, Context/Setting, Quality, Duration, Modifying Factors, Severity.)   Juventino Howard is a 59 y.o. female who presents to the emergency department with complaint of cold symptoms, shortness of breath, occasional chest pains, bilateral leg swelling. Patient says she has COPD, wears oxygen by nasal cannula at 3 L as needed, and she has been having to wear it a lot over the past week. She says she does not have any breathing treatments at home. Patient also says she has a CHF history, but stopped taking a water pill a while ago because she ran out of it. She says throughout the last week she has been having some sneezing, congestion, runny nose, and nonproductive cough. She says her breathing is becoming more difficult, especially when lying flat, and she really cannot lie flat now without feeling like she cannot breathe. She says she is occasionally getting chest pains, now and then, unprovoked, but does not have any chest pain presently. She says her appetite is been down a little bit but no nausea, vomiting or abdominal pain. Says her legs have been getting quite swollen over the past week, she can no longer put her shoes on, and there is a burning sensation in the legs and feet but no numbness. Denies any accidents or injuries. No other complaints. Nursing Notes were reviewed and I agree.     REVIEW OF SYSTEMS    (2-9 systems for level 4, 10 or more for level 5)     Constitutional:  Negative for fever, chills, appetite change, fatigue and unexpected weight change. HENT: Positive for congestion, rhinorrhea, sneezing. Negative for ear pain, facial swelling, sore throat and trouble swallowing. Eyes:  Negative for photophobia, pain and visual disturbance. Respiratory: Positive for shortness of breath, nonproductive cough. Negative for wheezing and stridor. Cardiovascular: Positive for intermittent chest pain, bilateral lower leg swelling. Negative for palpitations. Gastrointestinal:  Negative for nausea, vomiting, abdominal pain, diarrhea, constipation and blood in stool. Genitourinary:  Negative for dysuria, urgency, hematuria, flank pain, vaginal bleeding, vaginal discharge and pelvic pain. Musculoskeletal:  Negative for myalgias, arthralgias, neck pain and neck stiffness. Neurological:  Negative for dizziness, seizures, syncope, speech difficulty, weakness, light-headedness, numbness and headaches. Psychiatric/Behavioral:  Negative for suicidal ideas, hallucinations, confusion, sleep disturbance and agitation. Except as noted above the remainder of the review of systems was reviewed and negative.        PAST MEDICAL HISTORY         Diagnosis Date    Asthma     CAD (coronary artery disease)     CHF (congestive heart failure) (HCC)     COPD (chronic obstructive pulmonary disease) (HCC)     Depression     Diabetes [facility]     Hyperlipidemia     Hypertension     Hypothyroidism     Morbid obesity with BMI of 40.0-44.9, adult ([facility] 2019    Sleep apnea     Substance abuse ([facility]     Thyroid disease     Type 2 diabetes mellitus without complication ([facility]        SURGICAL HISTORY           Procedure Laterality Date    ADENOIDECTOMY      CARDIAC SURGERY       SECTION      x3    CORONARY ANGIOPLASTY WITH STENT PLACEMENT      EYE SURGERY      HERNIA REPAIR      HYSTERECTOMY      SINUS SURGERY      TONSILLECTOMY         CURRENT MEDICATIONS       Previous Medications    ALBUTEROL drugs.    PHYSICAL EXAM    (up to 7 for level 4, 8 or more for level 5)     ED Triage Vitals [03/13/20 1526]   BP Temp Temp Source Pulse Resp SpO2 Height Weight   (!) 142/85 97.3 °F (36.3 °C) Oral 72 11 97 % 4' 11\" (1.499 m) 225 lb 1.4 oz (102.1 kg)       Constitutional:  Appearing well-developed and well-nourished. No distress. HENT:  Normocephalic and atraumatic. Conjunctivae and EOM are normal. Pupils are equal, round, and reactive to light. Neck:  Normal range of motion. Neck supple. No tracheal deviation present. No thyromegaly present. No cervical adenopathy. Cardiovascular:  Normal rate, regular rhythm, normal heart sounds and intact distal pulses. Pulmonary/Chest:  Effort normal and breath sounds normal. No respiratory distress. No wheezes or rales. Abdominal:  Soft. Bowel sounds are normal. No distension, mass, tenderness, rebound or guarding. Musculoskeletal: 2+ pitting edema noted to the lower legs and ankles and feet bilaterally, negative for erythema or warmth. Normal range of motion. No edema exhibited. Neurological:  Alert and oriented to person, place, and time. No cranial nerve deficit. Skin:  Skin is warm and dry. Not diaphoretic. Psychiatric:  Normal mood, affect, behavior, judgment and thought content. DIAGNOSTIC RESULTS     RADIOLOGY:   Interpretation per the Radiologist below, if available at the time of this note:    XR CHEST STANDARD (2 VW)   Final Result   No evidence of acute cardiopulmonary disease.              LABS:  Labs Reviewed   RAPID INFLUENZA A/B ANTIGENS    Narrative:     Performed at:  [facility]  [address] [place], [place] [address]   Phone [phone]   CBC WITH AUTO DIFFERENTIAL    Narrative:     Performed at:  [facility]  [address] [place], [place] [address]   Phone [phone]   BASIC METABOLIC PANEL W/ REFLEX TO MG FOR LOW K    Narrative:     Performed at:  [facility]  [address] [place] [address]   Phone [phone]   BRAIN NATRIURETIC PEPTIDE    Narrative:     Performed at:  [facility]  [address] [place] [address]   Phone [phone]   TROPONIN    Narrative:     Performed at:  [facility]  [address] [place] [address]   Phone [phone]   LACTIC ACID, PLASMA    Narrative:     Performed at:  [facility]  [address] [place] [address]   Phone [phone]   BLOOD GAS, VENOUS    Narrative:     Performed at:  [facility]  [address] [address] [place] [address]   Phone [phone]   TROPONIN    Narrative:     Performed at:  [facility]  [address] [place] [address]   Phone [phone]       All other labs were within normal range or not returned as of this dictation. EMERGENCY DEPARTMENT COURSE and DIFFERENTIAL DIAGNOSIS/MDM:   Vitals:    Vitals:    03/13/20 1526 03/13/20 1724   BP: (!) 142/85    Pulse: 72    Resp: 11 17   Temp: 97.3 °F (36.3 °C)    TempSrc: Oral    SpO2: 97% 95%   Weight: 225 lb 1.4 oz (102.1 kg)    Height: 4' 11\" (1.499 m)        The patient's condition in the ED was good, the patient was afebrile and nontoxic in appearance, and the patient's physical exam was unremarkable. Patient says she is dependent on home oxygen, but she was oxygenating well here in the ED on room air, and her lungs were largely clear to auscultation. Not tachypneic or tachycardic. Flu swab was negative. Lab work-up showed no notable abnormalities, including a negative initial troponin and negative delta troponin. Chest x-ray showed no acute findings. Patient likely has an upper respiratory infection, but there was no indication for hospitalization or further workup.   She be discharged with a prescription for 20 mg Lasix daily for a week, and she will be advised to follow-up with her doctors promptly. The patient verbalized understanding and agreement with this plan of care. The patient was advised to return to the emergency department if symptoms should significantly worsen or if new and concerning symptoms should appear. I estimate there is LOW risk for PULMONARY EMBOLISM, PULMONARY EDEMA, PNEUMONIA, PNEUMOTHORAX, STATUS ASTHMATICUS, ACUTE RESPIRATORY FAILURE, OR ACUTE CORONARY SYNDROME, thus I consider the discharge disposition reasonable. PROCEDURES:  None    FINAL IMPRESSION      1.  Other form of dyspnea          DISPOSITION/PLAN   DISPOSITION Decision To Discharge 03/13/2020 10:00:13 PM      PATIENT REFERRED TO:  Family physician    Schedule an appointment as soon as possible for a visit in 1 week  If symptoms worsen      DISCHARGE MEDICATIONS:  New Prescriptions    FUROSEMIDE (LASIX) 20 MG TABLET    Take 1 tablet by mouth daily for 7 days       (Please note that portions of this note were completed with a voice recognition program.  Efforts were made to edit the dictations but occasionally words are mis-transcribed.)    Jose D Jean-Baptiste, [address] [place], [place]  03/13/20 8461

## 2020-03-14 LAB
EKG ATRIAL RATE: 68 BPM
EKG DIAGNOSIS: NORMAL
EKG P AXIS: 3 DEGREES
EKG P-R INTERVAL: 152 MS
EKG Q-T INTERVAL: 404 MS
EKG QRS DURATION: 72 MS
EKG QTC CALCULATION (BAZETT): 429 MS
EKG R AXIS: -25 DEGREES
EKG T AXIS: 32 DEGREES
EKG VENTRICULAR RATE: 68 BPM

## 2020-03-14 PROCEDURE — 93010 ELECTROCARDIOGRAM REPORT: CPT | Performed by: INTERNAL MEDICINE

## 2020-03-14 NOTE — ED PROVIDER NOTES
I independently evaluated and obtained a history and physical on Kiara Wong. All diagnostic, treatment, and disposition assistants were made to myself in conjunction the advanced practice provider. For further details of this patient's emergency department encounter, please see the advanced practice provider's documentation. History: Patient is a 28-year-old presents with shortness of breath for about 1 week. Patient had no specific chest pain. Patient has history of diabetes and hypertension. Patient is seen in conjunction with JOSE. Has been sleeping when I walked in there. Has been comfortable. There was no desats. Patient denies any swelling. There is no fever. Physician Exam: Patient is alert healthy looking elderly 28-year-old female. Normal vital signs. Afebrile as well as a pulse ox 95%. Her EKG shows normal sinus rhythm with a ventricular rate of 68 no ischemic changes. Patient's lungs were clear heart regular rate and rhythm. Her first initial troponin was normal.  We recommended and get a delta troponin if that is negative patient will be able to be discharged home. I do not think this is cardiac in nature at this time patient has been comfortable and has been resting comfortably here. Patient is discharged in good condition.         Salazar Looney MD  03/13/20 6977

## 2020-04-04 ENCOUNTER — APPOINTMENT (OUTPATIENT)
Dept: GENERAL RADIOLOGY | Age: 65
End: 2020-04-04
Payer: COMMERCIAL

## 2020-04-04 ENCOUNTER — HOSPITAL ENCOUNTER (EMERGENCY)
Age: 65
Discharge: HOME | End: 2020-04-04
Attending: EMERGENCY MEDICINE
Payer: COMMERCIAL

## 2020-04-04 VITALS
OXYGEN SATURATION: 92 % | DIASTOLIC BLOOD PRESSURE: 66 MMHG | HEART RATE: 73 BPM | BODY MASS INDEX: 45.2 KG/M2 | WEIGHT: 224.21 LBS | SYSTOLIC BLOOD PRESSURE: 132 MMHG | HEIGHT: 59 IN | RESPIRATION RATE: 18 BRPM | TEMPERATURE: 98.3 F

## 2020-04-04 LAB
A/G RATIO: 1.2 (ref 1.1–2.2)
ALBUMIN SERPL-MCNC: 3.8 G/DL (ref 3.4–5)
ALP BLD-CCNC: 72 U/L (ref 40–129)
ALT SERPL-CCNC: 14 U/L (ref 10–40)
ANION GAP SERPL CALCULATED.3IONS-SCNC: 11 MMOL/L (ref 3–16)
AST SERPL-CCNC: 11 U/L (ref 15–37)
BASOPHILS ABSOLUTE: 0.1 K/UL (ref 0–0.2)
BASOPHILS RELATIVE PERCENT: 0.7 %
BILIRUB SERPL-MCNC: 0.3 MG/DL (ref 0–1)
BILIRUBIN URINE: NEGATIVE
BLOOD, URINE: NEGATIVE
BUN BLDV-MCNC: 9 MG/DL (ref 7–20)
CALCIUM SERPL-MCNC: 9.3 MG/DL (ref 8.3–10.6)
CHLORIDE BLD-SCNC: 100 MMOL/L (ref 99–110)
CLARITY: CLEAR
CO2: 29 MMOL/L (ref 21–32)
COLOR: YELLOW
CREAT SERPL-MCNC: 0.5 MG/DL (ref 0.6–1.2)
EOSINOPHILS ABSOLUTE: 0.3 K/UL (ref 0–0.6)
EOSINOPHILS RELATIVE PERCENT: 4.2 %
GFR AFRICAN AMERICAN: >60
GFR NON-AFRICAN AMERICAN: >60
GLOBULIN: 3.1 G/DL
GLUCOSE BLD-MCNC: 115 MG/DL (ref 70–99)
GLUCOSE URINE: NEGATIVE MG/DL
HCT VFR BLD CALC: 40.2 % (ref 36–48)
HEMOGLOBIN: 13.4 G/DL (ref 12–16)
KETONES, URINE: NEGATIVE MG/DL
LEUKOCYTE ESTERASE, URINE: NEGATIVE
LYMPHOCYTES ABSOLUTE: 1.7 K/UL (ref 1–5.1)
LYMPHOCYTES RELATIVE PERCENT: 21 %
MAGNESIUM: 1.8 MG/DL (ref 1.8–2.4)
MCH RBC QN AUTO: 28.4 PG (ref 26–34)
MCHC RBC AUTO-ENTMCNC: 33.3 G/DL (ref 31–36)
MCV RBC AUTO: 85.4 FL (ref 80–100)
MICROSCOPIC EXAMINATION: NORMAL
MONOCYTES ABSOLUTE: 0.6 K/UL (ref 0–1.3)
MONOCYTES RELATIVE PERCENT: 8 %
NEUTROPHILS ABSOLUTE: 5.2 K/UL (ref 1.7–7.7)
NEUTROPHILS RELATIVE PERCENT: 66.1 %
NITRITE, URINE: NEGATIVE
PDW BLD-RTO: 13.4 % (ref 12.4–15.4)
PH UA: 7 (ref 5–8)
PLATELET # BLD: 299 K/UL (ref 135–450)
PMV BLD AUTO: 8.6 FL (ref 5–10.5)
POTASSIUM REFLEX MAGNESIUM: 3.4 MMOL/L (ref 3.5–5.1)
PRO-BNP: 86 PG/ML (ref 0–124)
PROTEIN UA: NEGATIVE MG/DL
RBC # BLD: 4.7 M/UL (ref 4–5.2)
SODIUM BLD-SCNC: 140 MMOL/L (ref 136–145)
SPECIFIC GRAVITY UA: 1.01 (ref 1–1.03)
TOTAL PROTEIN: 6.9 G/DL (ref 6.4–8.2)
TROPONIN: <0.01 NG/ML
URINE REFLEX TO CULTURE: NORMAL
URINE TYPE: NORMAL
UROBILINOGEN, URINE: 0.2 E.U./DL
WBC # BLD: 7.9 K/UL (ref 4–11)

## 2020-04-04 PROCEDURE — 81003 URINALYSIS AUTO W/O SCOPE: CPT

## 2020-04-04 PROCEDURE — 84484 ASSAY OF TROPONIN QUANT: CPT

## 2020-04-04 PROCEDURE — 71045 X-RAY EXAM CHEST 1 VIEW: CPT

## 2020-04-04 PROCEDURE — 96374 THER/PROPH/DIAG INJ IV PUSH: CPT

## 2020-04-04 PROCEDURE — 99285 EMERGENCY DEPT VISIT HI MDM: CPT

## 2020-04-04 PROCEDURE — 80053 COMPREHEN METABOLIC PANEL: CPT

## 2020-04-04 PROCEDURE — 85025 COMPLETE CBC W/AUTO DIFF WBC: CPT

## 2020-04-04 PROCEDURE — 83735 ASSAY OF MAGNESIUM: CPT

## 2020-04-04 PROCEDURE — 93005 ELECTROCARDIOGRAM TRACING: CPT | Performed by: EMERGENCY MEDICINE

## 2020-04-04 PROCEDURE — 83880 ASSAY OF NATRIURETIC PEPTIDE: CPT

## 2020-04-04 PROCEDURE — 6370000000 HC RX 637 (ALT 250 FOR IP): Performed by: EMERGENCY MEDICINE

## 2020-04-04 PROCEDURE — 6360000002 HC RX W HCPCS: Performed by: EMERGENCY MEDICINE

## 2020-04-04 RX ORDER — FUROSEMIDE 10 MG/ML
20 INJECTION INTRAMUSCULAR; INTRAVENOUS ONCE
Status: COMPLETED | OUTPATIENT
Start: 2020-04-04 | End: 2020-04-04

## 2020-04-04 RX ORDER — METHYLPREDNISOLONE 4 MG/1
4 TABLET ORAL SEE ADMIN INSTRUCTIONS
Qty: 1 KIT | Refills: 0 | Status: SHIPPED | OUTPATIENT
Start: 2020-04-04 | End: 2020-04-10

## 2020-04-04 RX ORDER — CIPROFLOXACIN 500 MG/1
500 TABLET, FILM COATED ORAL 2 TIMES DAILY
Qty: 20 TABLET | Refills: 0 | Status: SHIPPED | OUTPATIENT
Start: 2020-04-04 | End: 2020-04-14

## 2020-04-04 RX ORDER — PREDNISONE 20 MG/1
40 TABLET ORAL ONCE
Status: COMPLETED | OUTPATIENT
Start: 2020-04-04 | End: 2020-04-04

## 2020-04-04 RX ORDER — CIPROFLOXACIN 500 MG/1
500 TABLET, FILM COATED ORAL ONCE
Status: COMPLETED | OUTPATIENT
Start: 2020-04-04 | End: 2020-04-04

## 2020-04-04 RX ADMIN — CIPROFLOXACIN HYDROCHLORIDE 500 MG: 500 TABLET, FILM COATED ORAL at 11:01

## 2020-04-04 RX ADMIN — PREDNISONE 40 MG: 20 TABLET ORAL at 11:01

## 2020-04-04 RX ADMIN — FUROSEMIDE 20 MG: 10 INJECTION, SOLUTION INTRAMUSCULAR; INTRAVENOUS at 10:05

## 2020-04-04 NOTE — ED PROVIDER NOTES
[address]      Pt Name: Genet Bai  MRN: [id number]  [place] 1955  Date of evaluation: 4/4/2020  Provider: Alyce 149, [facility]  Chief Complaint   Patient presents with    Shortness of Breath     pt brought in by EMS. pt states she has been sick for a few weeks, was tested for COVID-19 2 weeks ago which was negative. pt states she has HX of COPD and CHF SOB gets worse while doing housework. This patient is a high risk for COVID-19 viral infection. Therefore, full personal protective equipment was used including face shield, N95 mask, head cover, impermeable gown, gloves, and protection was discussed. All equipment was cleaned with with chlorine wipes after I evaluated the patient. HPI  Genet Bai is a 59 y.o. female who presents with shortness of breath that is increased and she was here last time. They were going to meet her but they decided not to admit her. She is been coughing up white sputum and sneezing. She has a history of COPD. She was tested for coronavirus which was negative. She quit smoking cigarettes in September. She has chest pain that increases with deep breath but goes away with rest.  She denies any nausea vomiting. She been taking her medications as prescribed. She has been wheezing. She has a history of COPD and wears oxygen at 2 L/min nasal cannula at home. ? REVIEW OF SYSTEMS  All systems negative except as noted in the HPI. Reviewed Nurses' notes and concur. No LMP recorded. Patient has had a hysterectomy.     PAST MEDICAL HISTORY  Past Medical History:   Diagnosis Date    Asthma     CAD (coronary artery disease)     CHF (congestive heart failure) (HCC)     [facility]     Depression     Diabetes mellitus ([facility]     Hyperlipidemia     Hypertension     Hypothyroidism     Morbid obesity with BMI of 40.0-44.9, MG tablet Take 1 tablet by mouth daily 30 tablet 0    metFORMIN (GLUCOPHAGE) 1000 MG tablet Take 1 tablet by mouth 2 times daily (with meals) 180 tablet 3    lisinopril (PRINIVIL;ZESTRIL) 10 MG tablet TAKE 1 TABLET BY MOUTH NIGHTLY 90 tablet 2    levothyroxine (SYNTHROID) 50 MCG tablet TAKE 1 TABLET BY MOUTH DAILY 30 tablet 11    citalopram (CELEXA) 20 MG tablet TAKE 1 TABLET BY MOUTH DAILY 30 tablet 11    SYMBICORT 80-4.5 MCG/ACT AERO INHALE 2 PUFFS INTO THE LUNGS 2 TIMES DAILY 1 Inhaler 11    gabapentin (NEURONTIN) 800 MG tablet Take 800 mg by mouth 3 times daily.  Spacer/Aero-Holding Chambers (E-Z SPACER) ALBA 1 Device by Does not apply route daily 1 Device 0    famotidine (PEPCID) 20 MG tablet TAKE 1 TABLET BY MOUTH TWICE A DAY 60 tablet 5    aspirin 81 MG chewable tablet Take 1 tablet by mouth daily 90 tablet 2       ALLERGIES  Allergies   Allergen Reactions    Oxycodone-Acetaminophen Nausea And Vomiting     Rash.  Tramadol Other (See Comments)     States causes severe nausea and vomiting.  Codeine     Hydrocodone-Acetaminophen Nausea And Vomiting         PHYSICAL EXAM  VITAL SIGNS: /66   Pulse 73   Temp 98.3 °F (36.8 °C) (Oral)   Resp 18   Ht 4' 11\" (1.499 m)   Wt 224 lb 3.3 oz (101.7 kg)   SpO2 92%   BMI 45.28 kg/m²   Constitutional: Well-developed, well-nourished, appears normal, nontoxic, activity: Resting comfortably on the cart, no obvious pain, speaking in full sentences. HENT: Normocephalic, Atraumatic, Bilateral external ears normal, TM's were normal, Mucus membranes are moist and oropharynx is patent and clear, No oral exudates, Nose normal.  Eyes:  Conjunctiva normal, No discharge. No scleral icterus. Neck: Normal range of motion, No tenderness, Supple. Lymphatic: No lymphadenopathy noted. Cardiovascular: Normal heart rate, Normal rhythm, no murmurs, no gallops, no rubs.   Thorax & Lungs: Coarse breath sounds, no respiratory distress, bilateral expiratory does not appear acute  ST Segments: normal  T Waves: Diffusely flattened  Q Waves: Inferior and R wave progression noted anteriorly from V2 to V3 which appears remote    Other findings: Motion artifact noted    Compared to EKG on: 3/13/2020 and appears unchanged    Clinical Impression: Normal sinus rhythm with inferior and anterior infarctions age-indeterminate. There are no acute changes noted. Her EKG is unchanged from her previous EKG on 3/13/2020    [facility]      RADIOLOGY/PROCEDURES  I personally reviewed the images for this case. XR CHEST PORTABLE   Final Result   No acute findings in the chest.             NARCOTIC REVIEW      COURSE & MEDICAL DECISION MAKING  Pertinent Labs, EKG, & Imaging studies reviewed. (See chart for details)    Vitals:    04/04/20 0937 04/04/20 1032 04/04/20 1046   BP: (!) 141/80 138/75 132/66   Pulse: 75 79 73   Resp: 16 16 18   Temp: 98.3 °F (36.8 °C)     TempSrc: Oral     SpO2: 95% 92% 92%   Weight: 224 lb 3.3 oz (101.7 kg)     Height: 4' 11\" (1.499 m)         Medications   predniSONE (DELTASONE) tablet 40 mg (has no administration in time range)   ciprofloxacin (CIPRO) tablet 500 mg (has no administration in time range)   furosemide (LASIX) injection 20 mg (20 mg Intravenous Given 4/4/20 1005)       New Prescriptions    CIPROFLOXACIN (CIPRO) 500 MG TABLET    Take 1 tablet by mouth 2 times daily for 10 days    METHYLPREDNISOLONE (MEDROL, CRISTINA,) 4 MG TABLET    Take 1 tablet by mouth See Admin Instructions for 6 days By mouth as directed on the package. SEP-1 CORE MEASURE DATA  Exclusion criteria: the patient is NOT to be included for sepsis due to: Infection is not suspected        Patient remained stable in the ED. her chest x-ray was unchanged. The remainder of her work-up was negative. I do not feel coronavirus testing is necessary because she had coronavirus testing 2 weeks ago. She is afebrile. Therefore, patient was discharged with a Medrol Dosepak.

## 2020-04-05 LAB
EKG ATRIAL RATE: 71 BPM
EKG DIAGNOSIS: NORMAL
EKG P AXIS: 106 DEGREES
EKG P-R INTERVAL: 160 MS
EKG Q-T INTERVAL: 394 MS
EKG QRS DURATION: 70 MS
EKG QTC CALCULATION (BAZETT): 428 MS
EKG R AXIS: -29 DEGREES
EKG T AXIS: 27 DEGREES
EKG VENTRICULAR RATE: 71 BPM

## 2020-04-05 PROCEDURE — 93010 ELECTROCARDIOGRAM REPORT: CPT | Performed by: INTERNAL MEDICINE

## 2020-04-06 ENCOUNTER — CARE COORDINATION (OUTPATIENT)
Dept: CARE COORDINATION | Age: 65
End: 2020-04-06

## 2020-04-17 ENCOUNTER — CARE COORDINATION (OUTPATIENT)
Dept: CARE COORDINATION | Age: 65
End: 2020-04-17

## 2020-04-17 NOTE — CARE COORDINATION
You Patient resolved from the Care Transitions episode on 4/17/2020  Patient/family has been provided the following resources and education related to COVID-19:                         Signs, symptoms and red flags related to COVID-19            CDC exposure and quarantine guidelines            Conduit exposure contact - [phone]            Contact for their local Department of Health               The pt's sister stated the pt does not have a phone. The sister stated she lives next door to the pt and she takes calls for the pt. The sister stated the pt is back to her baseline. Pt has COPD, Asthma, CHF and DM. The sister stated the pt normally has some SOB. The sister stated the pt denies any fever and has been back to her normal walking the neighbor's dog. Patient currently reports that the following symptoms have improved:  cough, shortness of breath and no new/worsening symptoms     No further outreach scheduled with this CTN/ACM. Episode of Care resolved. Patient/Sister has this CTN/ACM contact information if future needs arise.

## 2020-05-14 RX ORDER — FUROSEMIDE 40 MG/1
TABLET ORAL
Qty: 60 TABLET | Refills: 2 | Status: SHIPPED | OUTPATIENT
Start: 2020-05-14 | End: 2020-09-08

## 2020-06-16 ENCOUNTER — CARE COORDINATION (OUTPATIENT)
Dept: CARE COORDINATION | Age: 65
End: 2020-06-16

## 2020-08-10 NOTE — PROGRESS NOTES
[facility]  Office Visit    Peter Holcomb  1955 August 12, 2020    CC:   Chief Complaint   Patient presents with    1 Year Follow Up     No CC     HPI:  The patient is 59 y.o. female with a past medical history significant for CAD, HTN, HLP, tobacco use, KATELYN and ischemic cardiomyopathy who is being seen in follow up for her CAD, HTN and HLP. She was last seen in January 2019 by Dr. Dean Boudreaux and was to have a yearly follow up. She was last hospitalized in October 2019 d/t SOB and felt to be a COPD exacerbation. Overall doing okay from cardiac standpoint. Remains off cigarettes (reeeks of cigarette smoke). Dealing with grief and depression from suicides of brother and sister which occurred in May and June. Denies chest pain/discomfort, SOB, orthopnea/PND, cough, palpitations, dizziness, syncope, edema , weight change or claudication. Monitoring sodium intake and fluid intake closely. Visiting counselor at present and on antidepressant. Follows  With Dr. Jayy Nelson as well. Works around [employer] and walks neighbor's dog for exercise. 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 Scheduled to see pulmonary for sleep evaluation. Review of Systems:  Constitutional: Denies  fatigue, weakness, night sweats or fever. HEENT: Denies new visual changes, ringing in ears, nosebleeds,nasal congestion  Respiratory: Denies new or change in SOB, PND, orthopnea or cough. Cardiovascular: see HPI  GI: Denies N/V, diarrhea, constipation, abdominal pain, change in bowel habits, melena or hematochezia  : Denies urinary frequency, urgency, incontinence, hematuria or dysuria. Skin: Denies rash, hives, or cyanosis  Musculoskeletal: Denies joint or muscle aches/pain  Neurological: Denies syncope or TIA-like symptoms.   Psychiatric: + anxiety/depression     Past Medical History:   Diagnosis Date    Asthma     CAD (coronary artery disease)     CHF (congestive heart failure) (HCC)     COPD [facility] obstructive [facility] disease) ([facility]     Depression     Diabetes mellitus ([place] [address]     Hyperlipidemia     Hypertension     Hypothyroidism     Morbid obesity with BMI of 40.0-44.9, adult ([facility] 2019    Sleep apnea     Substance abuse ([place] [address].)     Thyroid disease     Type 2 diabetes mellitus without complication ([place] [address]      Past Surgical History:   Procedure Laterality Date    ADENOIDECTOMY      CARDIAC SURGERY       SECTION      x3    CORONARY ANGIOPLASTY WITH STENT PLACEMENT      EYE SURGERY      HERNIA REPAIR      HYSTERECTOMY      SINUS SURGERY      TONSILLECTOMY       Family History   Problem Relation Age of Onset    Heart Disease Mother     High Blood Pressure Mother     Diabetes Mother     Heart Disease Father     High Blood Pressure Father     Diabetes Father      Social History     Tobacco Use    Smoking status: Former Smoker     Packs/day: 0.50     Years: 45.00     Pack years: 22.50     Types: Cigarettes     Last attempt to quit: 2019     Years since quittin.8    Smokeless tobacco: Never Used   Substance Use Topics    Alcohol use: No    Drug use: No       Allergies   Allergen Reactions    Oxycodone-Acetaminophen Nausea And Vomiting     Rash.  Tramadol Other (See Comments)     States causes severe nausea and vomiting.  Codeine     Hydrocodone-Acetaminophen Nausea And Vomiting     Current Outpatient Medications   Medication Sig Dispense Refill    furosemide (LASIX) 40 MG tablet TAKE 1 TABLET BY MOUTH TWICE A DAY 60 tablet 2    carvedilol (COREG) 3.125 MG tablet TAKE 1 TABLET BY MOUTH TWICE A DAY WITH MEALS 30 tablet 5    amLODIPine (NORVASC) 5 MG tablet TAKE 1 TABLET(S) EVERY DAY BY ORAL ROUTE FOR 30 DAYS. 30 tablet 5    nitroGLYCERIN (NITROSTAT) 0.4 MG SL tablet PLACE 1 TABLET UNDER THE TONGUE EVERY 5 MINUTES AS NEEDED FOR CHEST PAIN UP TO MAX OF 3 TOTAL DOSES.  IF NO RELIEF AFTER 1 DOSE, CALL 911. 50 tablet 1    albuterol sulfate  (90 Base) to start until her PCP prescribes  -will send note to her PCP (scheduled to see her on 9/1/2020)    5. Morbid obesity with BMI of 40.0-44.9, adult ([facility]  -weight loss encouraged    6. Chronic obstructive pulmonary disease, unspecified COPD type ([facility]  -to follow up with pulmonary  -H/O tobacco use (she states she has quit smoking)    7. Diabetes Mellitus, type II  -followed by PCP     Plan:  Continue amlodipine, ASA, carvedilol, plavix, lasix, lisinopril  Recommended adding statin (had previously been on: ? What happened). She wants to wait and ok with her PCP prior to resuming  Discussed low fat/low sodium diet and reinforced regular aerobic exercise. Scheduled for lab work on 9/1/2020 with PCP [facility])  Following up with pulmonary tomorrow regarding sleep eval/KATELYN and COPD  Labs from 4/2020 reviewed with pt  Follow up with Dr. Praveen Burgess in 6 months or sooner if needed    Return in about 6 months (around 2/12/2021) for with Dr. Praveen Burgess. Thanks for allowing me to participate in the care of this patient.       TERESA Cornelius-CNP  [facility], [address] [place] [address]) [place], [facility] [address]  Office: [phone]  Fax: [phone]

## 2020-08-12 ENCOUNTER — OFFICE VISIT (OUTPATIENT)
Dept: CARDIOLOGY CLINIC | Age: 65
End: 2020-08-12
Payer: COMMERCIAL

## 2020-08-12 VITALS
TEMPERATURE: 98.4 F | HEIGHT: 59 IN | HEART RATE: 76 BPM | BODY MASS INDEX: 42.82 KG/M2 | WEIGHT: 212.4 LBS | SYSTOLIC BLOOD PRESSURE: 102 MMHG | OXYGEN SATURATION: 91 % | DIASTOLIC BLOOD PRESSURE: 68 MMHG

## 2020-08-12 PROCEDURE — 99214 OFFICE O/P EST MOD 30 MIN: CPT | Performed by: NURSE PRACTITIONER

## 2020-08-12 PROCEDURE — G8926 SPIRO NO PERF OR DOC: HCPCS | Performed by: NURSE PRACTITIONER

## 2020-08-12 PROCEDURE — G8427 DOCREV CUR MEDS BY ELIG CLIN: HCPCS | Performed by: NURSE PRACTITIONER

## 2020-08-12 PROCEDURE — G8417 CALC BMI ABV UP PARAM F/U: HCPCS | Performed by: NURSE PRACTITIONER

## 2020-08-12 PROCEDURE — 1036F TOBACCO NON-USER: CPT | Performed by: NURSE PRACTITIONER

## 2020-08-12 PROCEDURE — 3023F SPIROM DOC REV: CPT | Performed by: NURSE PRACTITIONER

## 2020-08-12 PROCEDURE — 3017F COLORECTAL CA SCREEN DOC REV: CPT | Performed by: NURSE PRACTITIONER

## 2020-08-13 ENCOUNTER — OFFICE VISIT (OUTPATIENT)
Dept: PULMONOLOGY | Age: 65
End: 2020-08-13
Payer: COMMERCIAL

## 2020-08-13 VITALS
BODY MASS INDEX: 42.54 KG/M2 | RESPIRATION RATE: 16 BRPM | TEMPERATURE: 98.1 F | SYSTOLIC BLOOD PRESSURE: 98 MMHG | OXYGEN SATURATION: 91 % | DIASTOLIC BLOOD PRESSURE: 66 MMHG | HEIGHT: 59 IN | HEART RATE: 76 BPM | WEIGHT: 211 LBS

## 2020-08-13 PROCEDURE — G8926 SPIRO NO PERF OR DOC: HCPCS | Performed by: INTERNAL MEDICINE

## 2020-08-13 PROCEDURE — G8427 DOCREV CUR MEDS BY ELIG CLIN: HCPCS | Performed by: INTERNAL MEDICINE

## 2020-08-13 PROCEDURE — 1036F TOBACCO NON-USER: CPT | Performed by: INTERNAL MEDICINE

## 2020-08-13 PROCEDURE — G8417 CALC BMI ABV UP PARAM F/U: HCPCS | Performed by: INTERNAL MEDICINE

## 2020-08-13 PROCEDURE — 3023F SPIROM DOC REV: CPT | Performed by: INTERNAL MEDICINE

## 2020-08-13 PROCEDURE — 3017F COLORECTAL CA SCREEN DOC REV: CPT | Performed by: INTERNAL MEDICINE

## 2020-08-13 PROCEDURE — 99204 OFFICE O/P NEW MOD 45 MIN: CPT | Performed by: INTERNAL MEDICINE

## 2020-08-13 RX ORDER — TIOTROPIUM BROMIDE 18 UG/1
18 CAPSULE ORAL; RESPIRATORY (INHALATION) DAILY
Qty: 30 CAPSULE | Refills: 6 | Status: SHIPPED | OUTPATIENT
Start: 2020-08-13 | End: 2020-09-02

## 2020-08-13 NOTE — LETTER
[facility]  [facility]. [address] [facility]  Phone: [phone]  Fax: [phone]    Hiro Hein DO        August 13, 2020     Maryjane Ply Antonia Gowers, MD  [address] [facility]  [address]    Patient: Vikas Christensen  MR Number: <[id number]>  YOB: 1955  Date of Visit: 8/13/2020    Dear Dr. Brianna Mcdaniel D:    Thank you for the request for consultation for Daniel Lau to me for the evaluation of COPD. Below are the relevant portions of my assessment and plan of care. 1. Chronic obstructive pulmonary disease, unspecified COPD type ([address]  Historical diagnosis. No emphysema on CT. Does not have chronic bronchitis. Wheezing on exam.  Add on spiriva to her symbicort. PRN albuterol. Full PFTs needed  - tiotropium (SPIRIVA HANDIHALER) 18 MCG inhalation capsule; Inhale 1 capsule into the lungs daily  Dispense: 30 capsule; Refill: 6  - CT LUNG SCREENING; Future  - Full PFT Study With Bronchodilator; Future    2. Chronic respiratory failure with hypoxia (HCC)  Uses 2 liters with sleep and exertion on occasion. Does get benefit from using it    3. Tobacco abuse  Needs yearly CT scan for monitoring  - CT LUNG SCREENING; Future    4. KATELYN (obstructive sleep apnea)  Has this but is not interested in CPAP just yet. Does not want to have to leave her house for any time for testing. She would need inlab study due to insurance. If you have questions, please do not hesitate to call me. I look forward to following Augustina Bean along with you.     Sincerely,        Hiro Hein DO

## 2020-08-13 NOTE — PATIENT INSTRUCTIONS
Continue with symbicort twice a day    Use albuterol as needed    Start Spiriva once a day    Full breathing studies at hospital    CT chest at hospital    Follow up in 3 months

## 2020-08-13 NOTE — PROGRESS NOTES
Occupational History    Not on file   Social Needs    Financial resource strain: Not on file    Food insecurity     Worry: Not on file     Inability: Not on file    Transportation needs     Medical: Not on file     Non-medical: Not on file   Tobacco Use    Smoking status: Former Smoker     Packs/day: 1.25     Years: 45.00     Pack years: 56.25     Types: Cigarettes     Last attempt to quit: 2019     Years since quittin.8    Smokeless tobacco: Never Used   Substance and Sexual Activity    Alcohol use: No    Drug use: No    Sexual activity: Not Currently   Lifestyle    Physical activity     Days per week: Not on file     Minutes per session: Not on file    Stress: Not on file   Relationships    Social connections     Talks on phone: Not on file     Gets together: Not on file     Attends Sabianism service: Not on file     Active member of club or organization: Not on file     Attends meetings of clubs or organizations: Not on file     Relationship status: Not on file    Intimate partner violence     Fear of current or ex partner: Not on file     Emotionally abused: Not on file     Physically abused: Not on file     Forced sexual activity: Not on file   Other Topics Concern    Not on file   Social History Narrative    Not on file       Family History   Problem Relation Age of Onset    Heart Disease Mother     High Blood Pressure Mother     Diabetes Mother     Heart Disease Father     High Blood Pressure Father     Diabetes Father          Current Outpatient Medications:     tiotropium ([place]) 18 MCG inhalation capsule, Inhale 1 capsule into the lungs daily, Disp: 30 capsule, Rfl: 6    furosemide (LASIX) 40 MG tablet, TAKE 1 TABLET BY MOUTH TWICE A DAY, Disp: 60 tablet, Rfl: 2    carvedilol (COREG) 3.125 MG tablet, TAKE 1 TABLET BY MOUTH TWICE A DAY WITH MEALS, Disp: 30 tablet, Rfl: 5    amLODIPine (NORVASC) 5 MG tablet, TAKE 1 TABLET(S) EVERY DAY BY ORAL ROUTE FOR 30 DAYS., Disp: 30 tablet, Rfl: 5    nitroGLYCERIN (NITROSTAT) 0.4 MG SL tablet, PLACE 1 TABLET UNDER THE TONGUE EVERY 5 MINUTES AS NEEDED FOR CHEST PAIN UP TO MAX OF 3 TOTAL DOSES. IF NO RELIEF AFTER 1 DOSE, CALL 911., Disp: 50 tablet, Rfl: 1    albuterol sulfate  (90 Base) MCG/ACT inhaler, INHALE 2 PUFFS EVERY 6 HOURS AS NEEDED FOR WHEEZING, Disp: 1 Inhaler, Rfl: 11    clopidogrel (PLAVIX) 75 MG tablet, Take 1 tablet by mouth daily, Disp: 30 tablet, Rfl: 0    metFORMIN (GLUCOPHAGE) 1000 MG tablet, Take 1 tablet by mouth 2 times daily (with meals), Disp: 180 tablet, Rfl: 3    lisinopril (PRINIVIL;ZESTRIL) 10 MG tablet, TAKE 1 TABLET BY MOUTH NIGHTLY, Disp: 90 tablet, Rfl: 2    levothyroxine (SYNTHROID) 50 MCG tablet, TAKE 1 TABLET BY MOUTH DAILY, Disp: 30 tablet, Rfl: 11    citalopram (CELEXA) 20 MG tablet, TAKE 1 TABLET BY MOUTH DAILY, Disp: 30 tablet, Rfl: 11    SYMBICORT 80-4.5 MCG/ACT AERO, INHALE 2 PUFFS INTO THE LUNGS 2 TIMES DAILY, Disp: 1 Inhaler, Rfl: 11    gabapentin (NEURONTIN) 800 MG tablet, Take 800 mg by mouth 3 times daily. , Disp: , Rfl:     Spacer/Aero-Holding Chambers (E-Z SPACER) ALBA, 1 Device by Does not apply route daily, Disp: 1 Device, Rfl: 0    famotidine (PEPCID) 20 MG tablet, TAKE 1 TABLET BY MOUTH TWICE A DAY, Disp: 60 tablet, Rfl: 5    aspirin 81 MG chewable tablet, Take 1 tablet by mouth daily, Disp: 90 tablet, Rfl: 2      ROS:  GENERAL:  No fevers, chills, or night sweats, weight stable, some fatigue   HEENT:  No double vision, blurry vision, or difficulty swallowing  HEART:  No chest pain, no palpitations  LUNGS:  Some SOB and wheezing with exposure to the heat   ABDOMEN:  No abdominal pains, no changes in stools  GENITOURINARY:  No increased frequency, no blood in urine  EXTREMITIES:  No swelling, no lesions  NEURO:  No numbness or tingling, no seizures.   Depression from loss of siblings   SKIN:  No new rashes, no changes in hair or nails  LYMPH:  No masses, no swelling neck, armpits, or groin    PHYSICAL EXAM:  Vitals:    08/13/20 1106   BP: 98/66   Pulse: 76   Resp: 16   Temp: 98.1 °F (36.7 °C)   SpO2: 91%       GENERAL:  Well nourished, alert, appears stated age, no distress, obese female  HEENT:  No scleral icterus, no conjunctival irritation. Mallampati IV  NECK:  No thyromegaly, no bruits  LYMPH:  No cervical or supraclavicular adenopathy  HEART:  Regular rate and rhythm, no murmurs  LUNGS:  Wheezing  ABDOMEN:  No distention, no organomegaly  EXTREMITIES:  No edema, no digital clubbing  NEURO:  No localizing deficits, CN II-XII intact    Pulmonary Function Testing  None    Chest imaging from 9/2019 is reviewed. My interpretation is scarring. ECHO 9/2019  Concentric LVH with normal LV size and wall motion. EF is    60%. The left atrium is normal in size. Normal right ventricular size and function. Trivial mitral regurgitation is present. Lab Results   Component Value Date    WBC 7.9 04/04/2020    HGB 13.4 04/04/2020    HCT 40.2 04/04/2020    MCV 85.4 04/04/2020     04/04/2020       No results found for: BNP    Lab Results   Component Value Date    CREATININE 0.5 (L) 04/04/2020    BUN 9 04/04/2020     04/04/2020    K 3.4 (L) 04/04/2020     04/04/2020    CO2 29 04/04/2020       I reviewed above labs and studies pertinent to this visit and date    Assessment/Plan:  1. Chronic obstructive pulmonary disease, unspecified COPD type ([facility]  Historical diagnosis. No emphysema on CT. Does not have chronic bronchitis. Wheezing on exam.  Add on spiriva to her symbicort. PRN albuterol. Full PFTs needed  - tiotropium (SPIRIVA HANDIHALER) 18 MCG inhalation capsule; Inhale 1 capsule into the lungs daily  Dispense: 30 capsule; Refill: 6  - CT LUNG SCREENING; Future  - Full PFT Study With Bronchodilator; Future    2. Chronic respiratory failure with hypoxia (HCC)  Uses 2 liters with sleep and exertion on occasion.   Does get benefit from using

## 2020-08-14 ENCOUNTER — TELEPHONE (OUTPATIENT)
Dept: PULMONOLOGY | Age: 65
End: 2020-08-14

## 2020-08-17 RX ORDER — UMECLIDINIUM 62.5 UG/1
1 AEROSOL, POWDER ORAL DAILY
Qty: 1 EACH | Refills: 6 | Status: ON HOLD | OUTPATIENT
Start: 2020-08-17 | End: 2021-03-02 | Stop reason: SDUPTHER

## 2020-08-25 ENCOUNTER — TELEPHONE (OUTPATIENT)
Dept: CARDIOLOGY CLINIC | Age: 65
End: 2020-08-25

## 2020-09-02 ENCOUNTER — TELEPHONE (OUTPATIENT)
Dept: PULMONOLOGY | Age: 65
End: 2020-09-02

## 2020-09-02 NOTE — TELEPHONE ENCOUNTER
Aminata from [employer] called about sprivia handihaler. That inhaler is not on formulary, either need noted reason why or switched to incruse.     Thank you

## 2020-09-08 RX ORDER — FUROSEMIDE 40 MG/1
TABLET ORAL
Qty: 60 TABLET | Refills: 5 | Status: ON HOLD | OUTPATIENT
Start: 2020-09-08 | End: 2021-03-02 | Stop reason: SDUPTHER

## 2020-09-29 RX ORDER — CARVEDILOL 3.12 MG/1
TABLET ORAL
Qty: 30 TABLET | Refills: 5 | Status: SHIPPED | OUTPATIENT
Start: 2020-09-29 | End: 2021-01-15

## 2020-10-16 ENCOUNTER — HOSPITAL ENCOUNTER (OUTPATIENT)
Dept: MAMMOGRAPHY | Age: 65
Discharge: HOME | End: 2020-10-21
Payer: COMMERCIAL

## 2020-11-03 NOTE — PRE-PROCEDURE INSTRUCTIONS
[address]        Pre-Op Phone Call:     Patient Name: Jeannette Bruner     Telephone Information:   Mobile [phone]     Home phone:  [phone]    Surgery Time:   11:20am     Arrival Time:  10:20am     Left extended Message:  NA     Message left with:     Recent change in health status:  No     Advised of transportation/ policy:  Yes     NPO policy reviewed:  NA     Advised to take morning heart/blood pressure medications with sips of water morning of surgery? Yes     Instructed to bring eye drops, photo identification, and insurance card day of surgery? Yes     Advised to wear short sleeved button down shirt (no T-shirt underneath):  Yes     Advised not to wear jewelry, hairpins, or pantyhose day of surgery? NA     Advised not to wear make-up and to wash face day of surgery? NA    Remarks: Pt verbalized understanding of instructions.         Electronically signed by:  Chip Aranda RN at 11/3/2020 11:56 AM

## 2020-11-04 ENCOUNTER — APPOINTMENT (OUTPATIENT)
Dept: GENERAL RADIOLOGY | Age: 65
End: 2020-11-04
Payer: COMMERCIAL

## 2020-11-04 ENCOUNTER — HOSPITAL ENCOUNTER (EMERGENCY)
Age: 65
Discharge: HOME | End: 2020-11-04
Payer: COMMERCIAL

## 2020-11-04 VITALS
RESPIRATION RATE: 18 BRPM | HEART RATE: 71 BPM | DIASTOLIC BLOOD PRESSURE: 75 MMHG | SYSTOLIC BLOOD PRESSURE: 115 MMHG | TEMPERATURE: 97.5 F | OXYGEN SATURATION: 94 %

## 2020-11-04 LAB
ANION GAP SERPL CALCULATED.3IONS-SCNC: 12 MMOL/L (ref 3–16)
BASOPHILS ABSOLUTE: 0 K/UL (ref 0–0.2)
BASOPHILS RELATIVE PERCENT: 0.6 %
BUN BLDV-MCNC: 17 MG/DL (ref 7–20)
CALCIUM SERPL-MCNC: 9 MG/DL (ref 8.3–10.6)
CHLORIDE BLD-SCNC: 99 MMOL/L (ref 99–110)
CHP ED QC CHECK: NORMAL
CO2: 28 MMOL/L (ref 21–32)
CREAT SERPL-MCNC: 0.7 MG/DL (ref 0.6–1.2)
EKG ATRIAL RATE: 77 BPM
EKG DIAGNOSIS: NORMAL
EKG P AXIS: 46 DEGREES
EKG P-R INTERVAL: 166 MS
EKG Q-T INTERVAL: 398 MS
EKG QRS DURATION: 80 MS
EKG QTC CALCULATION (BAZETT): 450 MS
EKG R AXIS: -37 DEGREES
EKG T AXIS: 57 DEGREES
EKG VENTRICULAR RATE: 77 BPM
EOSINOPHILS ABSOLUTE: 0.1 K/UL (ref 0–0.6)
EOSINOPHILS RELATIVE PERCENT: 1.3 %
GFR AFRICAN AMERICAN: >60
GFR NON-AFRICAN AMERICAN: >60
GLUCOSE BLD-MCNC: 138 MG/DL (ref 70–99)
GLUCOSE BLD-MCNC: 140 MG/DL
GLUCOSE BLD-MCNC: 140 MG/DL (ref 70–99)
HCT VFR BLD CALC: 42.6 % (ref 36–48)
HEMOGLOBIN: 13.9 G/DL (ref 12–16)
LYMPHOCYTES ABSOLUTE: 1.5 K/UL (ref 1–5.1)
LYMPHOCYTES RELATIVE PERCENT: 18.5 %
MCH RBC QN AUTO: 28.1 PG (ref 26–34)
MCHC RBC AUTO-ENTMCNC: 32.6 G/DL (ref 31–36)
MCV RBC AUTO: 86.3 FL (ref 80–100)
MONOCYTES ABSOLUTE: 0.5 K/UL (ref 0–1.3)
MONOCYTES RELATIVE PERCENT: 6.3 %
NEUTROPHILS ABSOLUTE: 5.8 K/UL (ref 1.7–7.7)
NEUTROPHILS RELATIVE PERCENT: 73.3 %
PDW BLD-RTO: 14.1 % (ref 12.4–15.4)
PERFORMED ON: ABNORMAL
PLATELET # BLD: 281 K/UL (ref 135–450)
PMV BLD AUTO: 8.8 FL (ref 5–10.5)
POTASSIUM REFLEX MAGNESIUM: 3.6 MMOL/L (ref 3.5–5.1)
PRO-BNP: 25 PG/ML (ref 0–124)
RBC # BLD: 4.94 M/UL (ref 4–5.2)
SODIUM BLD-SCNC: 139 MMOL/L (ref 136–145)
TROPONIN: <0.01 NG/ML
WBC # BLD: 8 K/UL (ref 4–11)

## 2020-11-04 PROCEDURE — 99284 EMERGENCY DEPT VISIT MOD MDM: CPT

## 2020-11-04 PROCEDURE — 85025 COMPLETE CBC W/AUTO DIFF WBC: CPT

## 2020-11-04 PROCEDURE — 84484 ASSAY OF TROPONIN QUANT: CPT

## 2020-11-04 PROCEDURE — 93005 ELECTROCARDIOGRAM TRACING: CPT | Performed by: EMERGENCY MEDICINE

## 2020-11-04 PROCEDURE — 83880 ASSAY OF NATRIURETIC PEPTIDE: CPT

## 2020-11-04 PROCEDURE — 93010 ELECTROCARDIOGRAM REPORT: CPT | Performed by: INTERNAL MEDICINE

## 2020-11-04 PROCEDURE — 80048 BASIC METABOLIC PNL TOTAL CA: CPT

## 2020-11-04 PROCEDURE — 2580000003 HC RX 258: Performed by: PHYSICIAN ASSISTANT

## 2020-11-04 PROCEDURE — 71046 X-RAY EXAM CHEST 2 VIEWS: CPT

## 2020-11-04 RX ORDER — 0.9 % SODIUM CHLORIDE 0.9 %
500 INTRAVENOUS SOLUTION INTRAVENOUS ONCE
Status: COMPLETED | OUTPATIENT
Start: 2020-11-04 | End: 2020-11-04

## 2020-11-04 RX ADMIN — SODIUM CHLORIDE 500 ML: 9 INJECTION, SOLUTION INTRAVENOUS at 11:46

## 2020-11-04 ASSESSMENT — ENCOUNTER SYMPTOMS
SHORTNESS OF BREATH: 0
EYE PAIN: 0
NAUSEA: 0
BACK PAIN: 0
ABDOMINAL PAIN: 0
COUGH: 0
DIARRHEA: 0
VOMITING: 0

## 2020-11-04 NOTE — ED NOTES
D/C: Order noted for d/c. Pt confirmed d/c paperwork has correct name. Discharge and education instructions reviewed with patient. Teach-back successful. Pt verbalized understanding and signed d/c papers. Pt denied questions at this time. No acute distress noted. Patient instructed to follow-up as noted - return to emergency department if symptoms worsen. Patient verbalized understanding. Discharged per EDMD with discharge instructions. Pt discharged to private vehicle. Patient stable upon departure. Thanked patient for choosing [facility]) for care. Provider aware of patient pain at time of discharge.      Addy Escalante RN  11/04/20 3734

## 2020-11-04 NOTE — ED NOTES
[facility]  Expected date: 11/4/20  Expected time: 9:50 AM  Means of arrival: [facility]  Comments:  vertigo     Leyda Gage RN  11/04/20 1002

## 2020-11-04 NOTE — ED NOTES
Attempted orthostatics. Pt was laying flat and the room started spinning. Pt asked to sit up. Provider at bedside.      Emmanuel Tripp RN  11/04/20 7661

## 2020-11-04 NOTE — ED PROVIDER NOTES
[address] [facility]        Pt Name: Destiny Collado  MRN: [id number]  [place] 1955  Date of evaluation: 11/4/2020  Provider: Susanne Castleman, PA  PCP: Ledy GARCIA. I have evaluated this patient. My supervising physician was available for consultation. CHIEF COMPLAINT       Chief Complaint   Patient presents with    Dizziness     Started this morning, \"I have had something like this before\",        HISTORY OF PRESENT ILLNESS   (Location, Timing/Onset, Context/Setting, Quality, Duration, Modifying Factors, Severity, Associated Signs and Symptoms)  Note limiting factors. Destiny Collado is a 59 y.o. female who presents to the ED with complaints of lightheadedness and dizziness. PMH of asthma, CAD, CHF, COPD, NIDDM, HTN, HLD She states she feels \"tired and worn down\" since waking up this morning. She reports associated nausea and lightheadedness worse with sitting up and standing. She feels like she is going to faint. She denies vertigo or sensation of room spinning. She reports abdominal cramping earlier this morning and had a BM and the cramping improved. No lightheadedness or dizziness while at rest. She has been able to walk. No slurred speech, double vision, unilateral weakness. She states she's had this before and they said \"its my heart maybe blood pressure and sugar, all these different things, at the time\". The patient denies fever or chills, chest pain, shortness of breath,  vomiting, diarrhea. No recent travel, exposure to sick contacts, or recent antibiotics. No other acute concerns, associated symptoms or modifying factors. Nursing Notes were all reviewed and agreed with or any disagreements were addressed in the HPI. REVIEW OF SYSTEMS    (2-9 systems for level 4, 10 or more for level 5)     Review of Systems   Constitutional: Positive for fatigue. Negative for chills and fever.    Eyes: Negative for pain. Respiratory: Negative for cough and shortness of breath. Cardiovascular: Negative for chest pain. Gastrointestinal: Negative for abdominal pain, diarrhea, nausea and vomiting. Genitourinary: Negative for dysuria. Musculoskeletal: Negative for back pain, neck pain and neck stiffness. Skin: Negative for rash. Neurological: Positive for dizziness, weakness and light-headedness. Negative for tremors, seizures, syncope, facial asymmetry, speech difficulty, numbness and headaches. Psychiatric/Behavioral: Negative for confusion. Positives and Pertinent negatives as per HPI. Except as noted above in the ROS, all other systems were reviewed and negative. PAST MEDICAL HISTORY     Past Medical History:   Diagnosis Date    Asthma     CAD (coronary artery disease)     [facility]congestive [facility] failure) [facility]     COPD (chronic obstructive [facility] disease) [facility]     Depression     Diabetes mellitus [facility]     Hyperlipidemia     Hypertension     Hypothyroidism     Morbid obesity with BMI of 40.0-44.9, adult ([facility] 2019    Sleep apnea     Substance abuse ([facility]     Thyroid disease     Type 2 diabetes mellitus without complication ([facility]          SURGICAL HISTORY     Past Surgical History:   Procedure Laterality Date    ADENOIDECTOMY      CARDIAC SURGERY       SECTION      x3    CORONARY ANGIOPLASTY WITH STENT PLACEMENT      EYE SURGERY      HERNIA REPAIR      HYSTERECTOMY      SINUS SURGERY      TONSILLECTOMY           CURRENTMEDICATIONS       Previous Medications    ALBUTEROL SULFATE  (90 BASE) MCG/ACT INHALER    INHALE 2 PUFFS EVERY 6 HOURS AS NEEDED FOR WHEEZING    AMLODIPINE (NORVASC) 5 MG TABLET    TAKE 1 TABLET(S) EVERY DAY BY ORAL ROUTE FOR 30 DAYS.     ASPIRIN 81 MG CHEWABLE TABLET    Take 1 tablet by mouth daily    CARVEDILOL (COREG) 3.125 MG TABLET    TAKE ONE (1) TABLET BY MOUTH TWICE A DAY WITH MEALS     CITALOPRAM (CELEXA) 20 MG TABLET TAKE 1 TABLET BY MOUTH DAILY    CLOPIDOGREL (PLAVIX) 75 MG TABLET    Take 1 tablet by mouth daily    FAMOTIDINE (PEPCID) 20 MG TABLET    TAKE 1 TABLET BY MOUTH TWICE A DAY    FUROSEMIDE (LASIX) 40 MG TABLET    TAKE 1 TABLET BY MOUTH TWICE A DAY    GABAPENTIN (NEURONTIN) 800 MG TABLET    Take 800 mg by mouth 3 times daily. LEVOTHYROXINE (SYNTHROID) 50 MCG TABLET    TAKE 1 TABLET BY MOUTH DAILY    LISINOPRIL (PRINIVIL;ZESTRIL) 10 MG TABLET    TAKE 1 TABLET BY MOUTH NIGHTLY    METFORMIN (GLUCOPHAGE) 1000 MG TABLET    Take 1 tablet by mouth 2 times daily (with meals)    NITROGLYCERIN (NITROSTAT) 0.4 MG SL TABLET    PLACE 1 TABLET UNDER THE TONGUE EVERY 5 MINUTES AS NEEDED FOR CHEST PAIN UP TO MAX OF 3 TOTAL DOSES. IF NO RELIEF AFTER 1 DOSE, CALL 911. SPACER/AERO-HOLDING CHAMBERS (E-Z SPACER) ALBA    1 Device by Does not apply route daily    SYMBICORT 80-4.5 MCG/ACT AERO    INHALE 2 PUFFS INTO THE LUNGS 2 TIMES DAILY    UMECLIDINIUM BROMIDE (INCRUSE ELLIPTA) 62.5 MCG/INH AEPB    Inhale 1 puff into the lungs daily         ALLERGIES     Oxycodone-acetaminophen; Tramadol; Codeine; and Hydrocodone-acetaminophen    FAMILYHISTORY       Family History   Problem Relation Age of Onset    Heart Disease Mother     High Blood Pressure Mother     Diabetes Mother     Heart Disease Father     High Blood Pressure Father     Diabetes Father           SOCIAL HISTORY       Social History     Tobacco Use    Smoking status: Former Smoker     Packs/day: 1.25     Years: 45.00     Pack years: 56.25     Types: Cigarettes     Last attempt to quit: 2019     Years since quittin.0    Smokeless tobacco: Never Used   Substance Use Topics    Alcohol use: No    Drug use: No       SCREENINGS   NIH Stroke Scale  Interval: Baseline  Level of Consciousness (1a. ): Alert  LOC Questions (1b. ):  Answers both correctly  LOC Commands (1c. ): Performs both tasks correctly  Best Gaze (2. ): Normal  Visual (3. ): No visual loss  Facial Palsy (4. ): Normal symmetrical movement  Motor Arm, Left (5a. ): No drift  Motor Arm, Right (5b. ): No drift  Motor Leg, Left (6a. ): No drift  Motor Leg, Right (6b. ): No drift  Limb Ataxia (7. ): Absent  Sensory (8. ): Normal  Best Language (9. ): No aphasia  Dysarthria (10. ): Normal  Extinction and Inattention (11): No abnormality  Total: 0         PHYSICAL EXAM    (up to 7 for level 4, 8 or more for level 5)     ED Triage Vitals [11/04/20 1006]   BP Temp Temp Source Pulse Resp SpO2 Height Weight   116/63 97.9 °F (36.6 °C) Oral 77 20 93 % -- --       Physical Exam  Vitals signs and nursing note reviewed. Constitutional:       General: She is not in acute distress. Appearance: Normal appearance. She is well-developed. She is obese. She is not diaphoretic. HENT:      Head: Normocephalic and atraumatic. Eyes:      General:         Right eye: No discharge. Left eye: No discharge. Extraocular Movements: Extraocular movements intact. Pupils: Pupils are equal, round, and reactive to light. Neck:      Musculoskeletal: Normal range of motion and neck supple. Cardiovascular:      Rate and Rhythm: Normal rate. Pulmonary:      Effort: Pulmonary effort is normal. No respiratory distress. Breath sounds: No stridor. Abdominal:      Palpations: Abdomen is soft. Tenderness: There is no abdominal tenderness. Musculoskeletal: Normal range of motion. Skin:     General: Skin is warm and dry. Coloration: Skin is not pale. Neurological:      General: No focal deficit present. Mental Status: She is alert and oriented to person, place, and time. Cranial Nerves: No cranial nerve deficit. Sensory: No sensory deficit. Motor: No weakness. Comments: No gross facial drooping. Moves all 4 extremities spontaneously.   NO TRUNCAL ATAXIA  NEGATIVE TEST OF SKEW  NO NYSTAGMUS   Psychiatric:         Behavior: Behavior normal.         DIAGNOSTIC RESULTS   LABS:    Labs Reviewed   BASIC METABOLIC PANEL W/ REFLEX TO MG FOR LOW K - Abnormal; Notable for the following components:       Result Value    Glucose 138 (*)     All other components within normal limits    Narrative:     Performed at:  [facility]  [address] [place], [place] [address]   Phone [phone]   POCT GLUCOSE - Abnormal; Notable for the following components:    POC Glucose 140 (*)     All other components within normal limits    Narrative:     Performed at:  [facility]  [address] [place], [place] [address]   Phone [phone]   POCT GLUCOSE - Normal   CBC WITH AUTO DIFFERENTIAL    Narrative:     Performed at:  [place] [address] [place], [place] [address]   Phone [phone]   TROPONIN    Narrative:     Performed at:  [facility]  [address] [place] [address]   Phone [phone]   BRAIN NATRIURETIC PEPTIDE    Narrative:     Performed at:  [facility]  [address] [place] [address]   Phone [phone]   URINE RT REFLEX TO CULTURE       All other labs were within normal range or not returned as of this dictation. EKG: All EKG's are interpreted by the Emergency Department Physician in the absence of a cardiologist.  Please see their note for interpretation of EKG. RADIOLOGY:   Non-plain film images such as CT, Ultrasound and MRI are read by the radiologist. Plain radiographic images are visualized and preliminarily interpreted by the ED Provider with the below findings:        Interpretation per the Radiologist below, if available at the time of this note:    XR CHEST (2 VW)   Preliminary Result   No acute cardiopulmonary process. No results found.         PROCEDURES   Unless otherwise noted below, none     Procedures    CRITICAL CARE TIME   N/A    CONSULTS:  None      EMERGENCY

## 2020-11-05 ENCOUNTER — HOSPITAL ENCOUNTER (OUTPATIENT)
Dept: SURGERY | Age: 65
Setting detail: OUTPATIENT SURGERY
Discharge: HOME | End: 2020-11-05
Payer: COMMERCIAL

## 2020-12-14 RX ORDER — AMLODIPINE BESYLATE 5 MG/1
TABLET ORAL
Qty: 30 TABLET | Refills: 5 | Status: ON HOLD | OUTPATIENT
Start: 2020-12-14 | End: 2021-03-02 | Stop reason: HOSPADM

## 2021-01-05 NOTE — PROGRESS NOTES
[address]        Pre-Op Phone Call:     Patient Name: Denae Figueredo     Telephone Information:   Mobile [phone]     Home phone:  [phone]    Surgery Time:   [id number] Time:  1050     Left extended Message:  NA     Message left with:     Recent change in health status:  No     Advised of transportation/ policy:  Follow md instructions      NPO policy reviewed:  NA     Advised to take morning heart/blood pressure medications with sips of water morning of surgery? Yes     Instructed to bring eye drops, photo identification, and insurance card day of surgery? Yes     Advised to wear short sleeved button down shirt (no T-shirt underneath):  NA     Advised not to wear jewelry, hairpins, or pantyhose day of surgery? NA     Advised not to wear make-up and to wash face day of surgery?   NA    Remarks:        Electronically signed by:  Anabel Chatman RN at 1/5/2021 4:58 PM

## 2021-01-06 ENCOUNTER — PREP FOR PROCEDURE (OUTPATIENT)
Dept: OPHTHALMOLOGY | Age: 66
End: 2021-01-06

## 2021-01-06 RX ORDER — PROPARACAINE HYDROCHLORIDE 5 MG/ML
1 SOLUTION/ DROPS OPHTHALMIC ONCE
Status: CANCELLED | OUTPATIENT
Start: 2021-01-06 | End: 2021-01-06

## 2021-01-06 RX ORDER — PHENYLEPHRINE HCL 2.5 %
1 DROPS OPHTHALMIC (EYE) ONCE
Status: CANCELLED | OUTPATIENT
Start: 2021-01-06 | End: 2021-01-06

## 2021-01-06 RX ORDER — TROPICAMIDE 10 MG/ML
1 SOLUTION/ DROPS OPHTHALMIC ONCE
Status: CANCELLED | OUTPATIENT
Start: 2021-01-06 | End: 2021-01-06

## 2021-01-07 ENCOUNTER — HOSPITAL ENCOUNTER (OUTPATIENT)
Dept: SURGERY | Age: 66
Discharge: HOME | End: 2021-01-07
Payer: COMMERCIAL

## 2021-01-07 VITALS — SYSTOLIC BLOOD PRESSURE: 130 MMHG | DIASTOLIC BLOOD PRESSURE: 73 MMHG

## 2021-01-07 PROCEDURE — 2500000003 HC RX 250 WO HCPCS: Performed by: OPHTHALMOLOGY

## 2021-01-07 PROCEDURE — 66821 AFTER CATARACT LASER SURGERY: CPT

## 2021-01-07 PROCEDURE — 6370000000 HC RX 637 (ALT 250 FOR IP): Performed by: OPHTHALMOLOGY

## 2021-01-07 RX ORDER — TROPICAMIDE 10 MG/ML
1 SOLUTION/ DROPS OPHTHALMIC ONCE
Status: COMPLETED | OUTPATIENT
Start: 2021-01-07 | End: 2021-01-07

## 2021-01-07 RX ORDER — PHENYLEPHRINE HCL 2.5 %
1 DROPS OPHTHALMIC (EYE) ONCE
Status: COMPLETED | OUTPATIENT
Start: 2021-01-07 | End: 2021-01-07

## 2021-01-07 RX ORDER — PROPARACAINE HYDROCHLORIDE 5 MG/ML
1 SOLUTION/ DROPS OPHTHALMIC ONCE
Status: COMPLETED | OUTPATIENT
Start: 2021-01-07 | End: 2021-01-07

## 2021-01-07 RX ADMIN — PHENYLEPHRINE HYDROCHLORIDE 1 DROP: 25 SOLUTION/ DROPS OPHTHALMIC at 09:12

## 2021-01-07 RX ADMIN — PROPARACAINE HYDROCHLORIDE 1 DROP: 5 SOLUTION/ DROPS OPHTHALMIC at 09:10

## 2021-01-07 RX ADMIN — TROPICAMIDE 1 DROP: 10 SOLUTION/ DROPS OPHTHALMIC at 09:12

## 2021-01-07 NOTE — OP NOTE
Verner Snooks    OPERATIVE NOTE    Preoperative Diagnosis: Posterior Capsular Opacification the right eye. Postoperative Diagnosis: Posterior Capsular Opacification the right eye    Procedure: YAG posterior capsulotomy the right eye    Surgeon: Arely Stuart MD.    Anesthesia: Topical    Complications: none    Specimens: none    Indications for procedure: The patient is a 72y.o. year old who is status post cataract extraction with intraocular lens implantation who complained of increasing glare and blurry vision. Visually significant posterior capsular opacification was noted on examination. Risks, benefits, and alternatives to surgery were discussed with the patient and the patient elected to proceed with YAG laser posterior capsulotomy of the the right eye. Details of the procedure: The patient was brought the laser room. The patient had topical proparacaine drops instilled. The patient was positioned at the YAG laser where 20 shots were administered at 1.7 millijoules in a cruciate pattern. The patient tolerated the procedure well. The patient will follow up as an outpatient as scheduled.

## 2021-01-14 DIAGNOSIS — I10 ESSENTIAL HYPERTENSION: ICD-10-CM

## 2021-01-14 DIAGNOSIS — I25.10 CORONARY ARTERY DISEASE INVOLVING NATIVE CORONARY ARTERY OF NATIVE HEART WITHOUT ANGINA PECTORIS: ICD-10-CM

## 2021-01-15 RX ORDER — CARVEDILOL 3.12 MG/1
TABLET ORAL
Qty: 30 TABLET | Refills: 5 | Status: ON HOLD | OUTPATIENT
Start: 2021-01-15 | End: 2021-03-02 | Stop reason: SDUPTHER

## 2021-01-19 NOTE — PROGRESS NOTES
[address]        Pre-Op Phone Call:     Patient Name: Maria Nichols     Telephone Information:   Mobile [phone]     Home phone:  [phone]    Surgery Time:   29-75-24-36 Time:  4281     Left extended Message:  Yes     Message left with: vm     Recent change in health status: call md   Advised of transportation/ policy:  Follow Md instructions     NPO policy reviewed:  NA     Advised to take morning heart/blood pressure medications with sips of water morning of surgery? Yes     Instructed to bring eye drops, photo identification, and insurance card day of surgery? Yes     Advised to wear short sleeved button down shirt (no T-shirt underneath):  NA     Advised not to wear jewelry, hairpins, or pantyhose day of surgery? NA     Advised not to wear make-up and to wash face day of surgery?   NA    Remarks:        Electronically signed by:  Ag Berman RN at 1/19/2021 3:28 PM

## 2021-01-20 RX ORDER — TROPICAMIDE 10 MG/ML
1 SOLUTION/ DROPS OPHTHALMIC ONCE
Status: CANCELLED | OUTPATIENT
Start: 2021-01-20 | End: 2021-01-20

## 2021-01-20 RX ORDER — PHENYLEPHRINE HCL 2.5 %
1 DROPS OPHTHALMIC (EYE) ONCE
Status: CANCELLED | OUTPATIENT
Start: 2021-01-20 | End: 2021-01-20

## 2021-01-20 RX ORDER — PROPARACAINE HYDROCHLORIDE 5 MG/ML
1 SOLUTION/ DROPS OPHTHALMIC ONCE
Status: CANCELLED | OUTPATIENT
Start: 2021-01-20 | End: 2021-01-20

## 2021-01-21 ENCOUNTER — HOSPITAL ENCOUNTER (OUTPATIENT)
Dept: SURGERY | Age: 66
Discharge: HOME | End: 2021-01-21
Payer: COMMERCIAL

## 2021-01-26 NOTE — PROGRESS NOTES
[address]        Pre-Op Phone Call:     Patient Name: Felicita Mars     Telephone Information:   Mobile [phone]     Home phone:  [phone]    Surgery Time:  [phone] Time:  6062      Left extended Message:  NA     Message left with:     Recent change in health status:  No     Advised of transportation/ policy:  Follow md instructions      NPO policy reviewed:  NA     Advised to take morning heart/blood pressure medications with sips of water morning of surgery? Yes     Instructed to bring eye drops, photo identification, and insurance card day of surgery? Yes     Advised to wear short sleeved button down shirt (no T-shirt underneath):  NA     Advised not to wear jewelry, hairpins, or pantyhose day of surgery? NA     Advised not to wear make-up and to wash face day of surgery?   NA    Remarks:        Electronically signed by:  James March RN at 1/26/2021 1:50 PM

## 2021-01-27 ENCOUNTER — PREP FOR PROCEDURE (OUTPATIENT)
Dept: OPHTHALMOLOGY | Age: 66
End: 2021-01-27

## 2021-01-27 RX ORDER — TROPICAMIDE 10 MG/ML
1 SOLUTION/ DROPS OPHTHALMIC ONCE
Status: CANCELLED | OUTPATIENT
Start: 2021-01-27 | End: 2021-01-27

## 2021-01-27 RX ORDER — PHENYLEPHRINE HCL 2.5 %
1 DROPS OPHTHALMIC (EYE) ONCE
Status: CANCELLED | OUTPATIENT
Start: 2021-01-27 | End: 2021-01-27

## 2021-01-27 RX ORDER — PROPARACAINE HYDROCHLORIDE 5 MG/ML
1 SOLUTION/ DROPS OPHTHALMIC ONCE
Status: CANCELLED | OUTPATIENT
Start: 2021-01-27 | End: 2021-01-27

## 2021-01-28 ENCOUNTER — HOSPITAL ENCOUNTER (OUTPATIENT)
Dept: SURGERY | Age: 66
Setting detail: OUTPATIENT SURGERY
Discharge: HOME | End: 2021-01-28
Payer: COMMERCIAL

## 2021-02-22 ENCOUNTER — APPOINTMENT (OUTPATIENT)
Dept: GENERAL RADIOLOGY | Age: 66
DRG: 147 | End: 2021-02-22
Payer: COMMERCIAL

## 2021-02-22 ENCOUNTER — APPOINTMENT (OUTPATIENT)
Dept: CT IMAGING | Age: 66
DRG: 147 | End: 2021-02-22
Payer: COMMERCIAL

## 2021-02-22 ENCOUNTER — HOSPITAL ENCOUNTER (INPATIENT)
Age: 66
LOS: 8 days | Discharge: HOME | DRG: 147 | End: 2021-03-02
Attending: EMERGENCY MEDICINE | Admitting: INTERNAL MEDICINE
Payer: COMMERCIAL

## 2021-02-22 DIAGNOSIS — J44.1 COPD EXACERBATION (HCC): ICD-10-CM

## 2021-02-22 DIAGNOSIS — J44.9 CHRONIC OBSTRUCTIVE PULMONARY DISEASE, UNSPECIFIED COPD TYPE (HCC): ICD-10-CM

## 2021-02-22 DIAGNOSIS — I25.10 CORONARY ARTERY DISEASE INVOLVING NATIVE CORONARY ARTERY OF NATIVE HEART WITHOUT ANGINA PECTORIS: ICD-10-CM

## 2021-02-22 DIAGNOSIS — E11.8 TYPE 2 DIABETES MELLITUS WITH COMPLICATION, WITHOUT LONG-TERM CURRENT USE OF INSULIN (HCC): ICD-10-CM

## 2021-02-22 DIAGNOSIS — R07.9 CHEST PAIN, UNSPECIFIED TYPE: Primary | ICD-10-CM

## 2021-02-22 DIAGNOSIS — R42 ORTHOSTATIC LIGHTHEADEDNESS: ICD-10-CM

## 2021-02-22 DIAGNOSIS — I65.22 STENOSIS OF LEFT CAROTID ARTERY: ICD-10-CM

## 2021-02-22 DIAGNOSIS — I25.9 ISCHEMIC HEART DISEASE DUE TO CORONARY ARTERY OBSTRUCTION (HCC): ICD-10-CM

## 2021-02-22 DIAGNOSIS — F33.1 MODERATE EPISODE OF RECURRENT MAJOR DEPRESSIVE DISORDER (HCC): ICD-10-CM

## 2021-02-22 DIAGNOSIS — I24.0 ISCHEMIC HEART DISEASE DUE TO CORONARY ARTERY OBSTRUCTION (HCC): ICD-10-CM

## 2021-02-22 DIAGNOSIS — K13.79 LESION OF PALATE: ICD-10-CM

## 2021-02-22 DIAGNOSIS — I10 ESSENTIAL HYPERTENSION: ICD-10-CM

## 2021-02-22 LAB
A/G RATIO: 1 (ref 1.1–2.2)
ALBUMIN SERPL-MCNC: 3.6 G/DL (ref 3.4–5)
ALP BLD-CCNC: 82 U/L (ref 40–129)
ALT SERPL-CCNC: 9 U/L (ref 10–40)
ANION GAP SERPL CALCULATED.3IONS-SCNC: 10 MMOL/L (ref 3–16)
AST SERPL-CCNC: 11 U/L (ref 15–37)
BASOPHILS ABSOLUTE: 0 K/UL (ref 0–0.2)
BASOPHILS RELATIVE PERCENT: 0.5 %
BILIRUB SERPL-MCNC: 0.4 MG/DL (ref 0–1)
BUN BLDV-MCNC: 15 MG/DL (ref 7–20)
CALCIUM SERPL-MCNC: 9.8 MG/DL (ref 8.3–10.6)
CHLORIDE BLD-SCNC: 101 MMOL/L (ref 99–110)
CO2: 29 MMOL/L (ref 21–32)
CREAT SERPL-MCNC: 0.6 MG/DL (ref 0.6–1.2)
EOSINOPHILS ABSOLUTE: 0.1 K/UL (ref 0–0.6)
EOSINOPHILS RELATIVE PERCENT: 1 %
GFR AFRICAN AMERICAN: >60
GFR NON-AFRICAN AMERICAN: >60
GLOBULIN: 3.5 G/DL
GLUCOSE BLD-MCNC: 118 MG/DL (ref 70–99)
HCT VFR BLD CALC: 43.7 % (ref 36–48)
HEMOGLOBIN: 14.4 G/DL (ref 12–16)
LYMPHOCYTES ABSOLUTE: 2.1 K/UL (ref 1–5.1)
LYMPHOCYTES RELATIVE PERCENT: 20.9 %
MCH RBC QN AUTO: 28.3 PG (ref 26–34)
MCHC RBC AUTO-ENTMCNC: 33 G/DL (ref 31–36)
MCV RBC AUTO: 85.8 FL (ref 80–100)
MONOCYTES ABSOLUTE: 0.4 K/UL (ref 0–1.3)
MONOCYTES RELATIVE PERCENT: 4.3 %
NEUTROPHILS ABSOLUTE: 7.3 K/UL (ref 1.7–7.7)
NEUTROPHILS RELATIVE PERCENT: 73.3 %
PDW BLD-RTO: 13.7 % (ref 12.4–15.4)
PLATELET # BLD: 313 K/UL (ref 135–450)
PMV BLD AUTO: 9.3 FL (ref 5–10.5)
POTASSIUM REFLEX MAGNESIUM: 4.9 MMOL/L (ref 3.5–5.1)
PRO-BNP: 33 PG/ML (ref 0–124)
RBC # BLD: 5.09 M/UL (ref 4–5.2)
REASON FOR REJECTION: NORMAL
REJECTED TEST: NORMAL
SARS-COV-2, NAAT: NOT DETECTED
SODIUM BLD-SCNC: 140 MMOL/L (ref 136–145)
TOTAL PROTEIN: 7.1 G/DL (ref 6.4–8.2)
TROPONIN: <0.01 NG/ML
WBC # BLD: 10 K/UL (ref 4–11)

## 2021-02-22 PROCEDURE — 70496 CT ANGIOGRAPHY HEAD: CPT

## 2021-02-22 PROCEDURE — 6370000000 HC RX 637 (ALT 250 FOR IP): Performed by: INTERNAL MEDICINE

## 2021-02-22 PROCEDURE — 87635 SARS-COV-2 COVID-19 AMP PRB: CPT

## 2021-02-22 PROCEDURE — 2580000003 HC RX 258: Performed by: PHYSICIAN ASSISTANT

## 2021-02-22 PROCEDURE — 1200000000 HC SEMI PRIVATE

## 2021-02-22 PROCEDURE — 6360000004 HC RX CONTRAST MEDICATION: Performed by: EMERGENCY MEDICINE

## 2021-02-22 PROCEDURE — 85025 COMPLETE CBC W/AUTO DIFF WBC: CPT

## 2021-02-22 PROCEDURE — 71045 X-RAY EXAM CHEST 1 VIEW: CPT

## 2021-02-22 PROCEDURE — 93005 ELECTROCARDIOGRAM TRACING: CPT | Performed by: PHYSICIAN ASSISTANT

## 2021-02-22 PROCEDURE — 6360000002 HC RX W HCPCS: Performed by: PHYSICIAN ASSISTANT

## 2021-02-22 PROCEDURE — 6370000000 HC RX 637 (ALT 250 FOR IP): Performed by: PHYSICIAN ASSISTANT

## 2021-02-22 PROCEDURE — 96374 THER/PROPH/DIAG INJ IV PUSH: CPT

## 2021-02-22 PROCEDURE — 36415 COLL VENOUS BLD VENIPUNCTURE: CPT

## 2021-02-22 PROCEDURE — 94640 AIRWAY INHALATION TREATMENT: CPT

## 2021-02-22 PROCEDURE — 84484 ASSAY OF TROPONIN QUANT: CPT

## 2021-02-22 PROCEDURE — 94761 N-INVAS EAR/PLS OXIMETRY MLT: CPT

## 2021-02-22 PROCEDURE — 70450 CT HEAD/BRAIN W/O DYE: CPT

## 2021-02-22 PROCEDURE — 6370000000 HC RX 637 (ALT 250 FOR IP): Performed by: EMERGENCY MEDICINE

## 2021-02-22 PROCEDURE — 83880 ASSAY OF NATRIURETIC PEPTIDE: CPT

## 2021-02-22 PROCEDURE — 80053 COMPREHEN METABOLIC PANEL: CPT

## 2021-02-22 PROCEDURE — 99285 EMERGENCY DEPT VISIT HI MDM: CPT

## 2021-02-22 RX ORDER — POTASSIUM CHLORIDE 20 MEQ/1
40 TABLET, EXTENDED RELEASE ORAL PRN
Status: DISCONTINUED | OUTPATIENT
Start: 2021-02-22 | End: 2021-03-02 | Stop reason: HOSPADM

## 2021-02-22 RX ORDER — LEVOTHYROXINE SODIUM 0.05 MG/1
50 TABLET ORAL DAILY
Status: DISCONTINUED | OUTPATIENT
Start: 2021-02-23 | End: 2021-03-02 | Stop reason: HOSPADM

## 2021-02-22 RX ORDER — CLOPIDOGREL BISULFATE 75 MG/1
75 TABLET ORAL DAILY
Status: DISCONTINUED | OUTPATIENT
Start: 2021-02-22 | End: 2021-03-02

## 2021-02-22 RX ORDER — SODIUM CHLORIDE 0.9 % (FLUSH) 0.9 %
10 SYRINGE (ML) INJECTION PRN
Status: DISCONTINUED | OUTPATIENT
Start: 2021-02-22 | End: 2021-03-02 | Stop reason: HOSPADM

## 2021-02-22 RX ORDER — MECLIZINE HCL 12.5 MG/1
12.5 TABLET ORAL ONCE
Status: COMPLETED | OUTPATIENT
Start: 2021-02-22 | End: 2021-02-22

## 2021-02-22 RX ORDER — ACETAMINOPHEN 325 MG/1
650 TABLET ORAL EVERY 6 HOURS PRN
Status: DISCONTINUED | OUTPATIENT
Start: 2021-02-22 | End: 2021-03-02 | Stop reason: HOSPADM

## 2021-02-22 RX ORDER — 0.9 % SODIUM CHLORIDE 0.9 %
1000 INTRAVENOUS SOLUTION INTRAVENOUS ONCE
Status: COMPLETED | OUTPATIENT
Start: 2021-02-22 | End: 2021-02-22

## 2021-02-22 RX ORDER — NITROGLYCERIN 0.4 MG/1
0.4 TABLET SUBLINGUAL EVERY 5 MIN PRN
Status: DISCONTINUED | OUTPATIENT
Start: 2021-02-22 | End: 2021-02-22

## 2021-02-22 RX ORDER — CITALOPRAM 20 MG/1
20 TABLET ORAL DAILY
Status: DISCONTINUED | OUTPATIENT
Start: 2021-02-23 | End: 2021-02-27

## 2021-02-22 RX ORDER — CARVEDILOL 3.12 MG/1
3.12 TABLET ORAL 2 TIMES DAILY WITH MEALS
Status: DISCONTINUED | OUTPATIENT
Start: 2021-02-23 | End: 2021-02-25

## 2021-02-22 RX ORDER — ASPIRIN 81 MG/1
324 TABLET, CHEWABLE ORAL ONCE
Status: COMPLETED | OUTPATIENT
Start: 2021-02-22 | End: 2021-02-22

## 2021-02-22 RX ORDER — METHYLPREDNISOLONE SODIUM SUCCINATE 125 MG/2ML
60 INJECTION, POWDER, LYOPHILIZED, FOR SOLUTION INTRAMUSCULAR; INTRAVENOUS ONCE
Status: COMPLETED | OUTPATIENT
Start: 2021-02-22 | End: 2021-02-22

## 2021-02-22 RX ORDER — LANOLIN ALCOHOL/MO/W.PET/CERES
3 CREAM (GRAM) TOPICAL NIGHTLY PRN
Status: DISCONTINUED | OUTPATIENT
Start: 2021-02-22 | End: 2021-03-02 | Stop reason: HOSPADM

## 2021-02-22 RX ORDER — ONDANSETRON 2 MG/ML
4 INJECTION INTRAMUSCULAR; INTRAVENOUS EVERY 6 HOURS PRN
Status: DISCONTINUED | OUTPATIENT
Start: 2021-02-22 | End: 2021-03-02 | Stop reason: HOSPADM

## 2021-02-22 RX ORDER — IPRATROPIUM BROMIDE AND ALBUTEROL SULFATE 2.5; .5 MG/3ML; MG/3ML
1 SOLUTION RESPIRATORY (INHALATION) ONCE
Status: COMPLETED | OUTPATIENT
Start: 2021-02-22 | End: 2021-02-22

## 2021-02-22 RX ORDER — ACETAMINOPHEN 650 MG/1
650 SUPPOSITORY RECTAL EVERY 6 HOURS PRN
Status: DISCONTINUED | OUTPATIENT
Start: 2021-02-22 | End: 2021-03-02 | Stop reason: HOSPADM

## 2021-02-22 RX ORDER — IPRATROPIUM BROMIDE AND ALBUTEROL SULFATE 2.5; .5 MG/3ML; MG/3ML
1 SOLUTION RESPIRATORY (INHALATION)
Status: DISCONTINUED | OUTPATIENT
Start: 2021-02-23 | End: 2021-03-02 | Stop reason: HOSPADM

## 2021-02-22 RX ORDER — BUDESONIDE AND FORMOTEROL FUMARATE DIHYDRATE 80; 4.5 UG/1; UG/1
2 AEROSOL RESPIRATORY (INHALATION) 2 TIMES DAILY
Status: DISCONTINUED | OUTPATIENT
Start: 2021-02-22 | End: 2021-03-02 | Stop reason: HOSPADM

## 2021-02-22 RX ORDER — FAMOTIDINE 20 MG/1
20 TABLET, FILM COATED ORAL 2 TIMES DAILY
Status: DISCONTINUED | OUTPATIENT
Start: 2021-02-22 | End: 2021-03-02 | Stop reason: HOSPADM

## 2021-02-22 RX ORDER — GABAPENTIN 400 MG/1
800 CAPSULE ORAL 3 TIMES DAILY
Status: DISCONTINUED | OUTPATIENT
Start: 2021-02-22 | End: 2021-02-25

## 2021-02-22 RX ORDER — POTASSIUM CHLORIDE 7.45 MG/ML
10 INJECTION INTRAVENOUS PRN
Status: DISCONTINUED | OUTPATIENT
Start: 2021-02-22 | End: 2021-03-02 | Stop reason: HOSPADM

## 2021-02-22 RX ORDER — IPRATROPIUM BROMIDE AND ALBUTEROL SULFATE 2.5; .5 MG/3ML; MG/3ML
1 SOLUTION RESPIRATORY (INHALATION) 4 TIMES DAILY PRN
Status: DISCONTINUED | OUTPATIENT
Start: 2021-02-22 | End: 2021-03-02 | Stop reason: HOSPADM

## 2021-02-22 RX ORDER — ASPIRIN 81 MG/1
81 TABLET, CHEWABLE ORAL DAILY
Status: CANCELLED | OUTPATIENT
Start: 2021-02-23

## 2021-02-22 RX ORDER — PROMETHAZINE HYDROCHLORIDE 25 MG/1
12.5 TABLET ORAL EVERY 6 HOURS PRN
Status: DISCONTINUED | OUTPATIENT
Start: 2021-02-22 | End: 2021-03-02 | Stop reason: HOSPADM

## 2021-02-22 RX ORDER — MAGNESIUM SULFATE 1 G/100ML
1000 INJECTION INTRAVENOUS PRN
Status: DISCONTINUED | OUTPATIENT
Start: 2021-02-22 | End: 2021-03-02 | Stop reason: HOSPADM

## 2021-02-22 RX ORDER — NICOTINE 21 MG/24HR
1 PATCH, TRANSDERMAL 24 HOURS TRANSDERMAL DAILY
Status: DISCONTINUED | OUTPATIENT
Start: 2021-02-23 | End: 2021-03-02 | Stop reason: HOSPADM

## 2021-02-22 RX ADMIN — ASPIRIN 324 MG: 81 TABLET, CHEWABLE ORAL at 15:59

## 2021-02-22 RX ADMIN — MECLIZINE 12.5 MG: 12.5 TABLET ORAL at 15:59

## 2021-02-22 RX ADMIN — METHYLPREDNISOLONE SODIUM SUCCINATE 60 MG: 125 INJECTION, POWDER, FOR SOLUTION INTRAMUSCULAR; INTRAVENOUS at 12:51

## 2021-02-22 RX ADMIN — SODIUM CHLORIDE 1000 ML: 9 INJECTION, SOLUTION INTRAVENOUS at 15:55

## 2021-02-22 RX ADMIN — IOPAMIDOL 75 ML: 755 INJECTION, SOLUTION INTRAVENOUS at 15:39

## 2021-02-22 RX ADMIN — NITROGLYCERIN 0.4 MG: 0.4 TABLET, ORALLY DISINTEGRATING SUBLINGUAL at 12:51

## 2021-02-22 RX ADMIN — BUDESONIDE AND FORMOTEROL FUMARATE DIHYDRATE 2 PUFF: 80; 4.5 AEROSOL RESPIRATORY (INHALATION) at 21:26

## 2021-02-22 RX ADMIN — IPRATROPIUM BROMIDE AND ALBUTEROL SULFATE 1 AMPULE: .5; 3 SOLUTION RESPIRATORY (INHALATION) at 14:00

## 2021-02-22 ASSESSMENT — PAIN DESCRIPTION - PAIN TYPE: TYPE: ACUTE PAIN

## 2021-02-22 ASSESSMENT — HEART SCORE: ECG: 1

## 2021-02-22 NOTE — CARE COORDINATION
INITIAL CASE MANAGEMENT ASSESSMENT    Reviewed chart, met with patient to assess possible discharge needs. Explained Case Management role/services. Living Situation: Lives alone    ADLs: Independent     DME: Has Home O2, unsure of provider. PT/OT Recs: TBD     Active Services: Has [place] on [address] [place] and Home Delivered Meals. Transportation: Insurance     Medications: [address]    PCP: [address]      HD/PD: N/A    PLAN/COMMENTS: Home with home care if ordered, home care list provided, has had [address] in the past.      SW/CM provided contact information for patient or family to call with any questions. SW/CM will follow and assist as needed. The Plan for Transition of Care is related to the following treatment goals: Return home    The Patient  was provided with a choice of provider and agrees   with the discharge plan. [x] Yes [] No    Freedom of choice list was provided with basic dialogue that supports the patient's individualized plan of care/goals, treatment preferences and shares the quality data associated with the providers. [x] Yes [] No    Advanced Care Planning completed.

## 2021-02-22 NOTE — ED PROVIDER NOTES
Date of evaluation: 2/22/2021    ED Attending Attestation Note     CHIEF COMPLAINT       Chief Complaint   Patient presents with    Abdominal Pain     HISTORY OF PRESENT ILLNESS  (Location/Symptom, Timing/Onset,Context/Setting, Quality, Duration, Modifying Factors, Severity). Note limiting factors. This patient was seen by the advance practice provider. I have seen and examined the patient, agree with the workup, evaluation, management and diagnosis. The care plan has been discussed. Qasim Rhoades is a 72 y.o. female who presents to the emergency department secondary to concern for lightheadedness. She reports brief similar episodes for a few days but this morning it was constant and associated with substernal chest pain. Denies abdominal pain. CP improved after nitro here. She received one liter IVF. On physical exam did note some wheezing, s/p breathing treatments. Past medical history significant for asthma, CAD, heart failure (though echo from 10.2019 with normal EF),  COPD, depression, DM, HLD, HTN, hypothyroidism, substance abuse, thyroid disease. Aside from what is stated above denies any other symptoms or modifying factors. Nursing Notes reviewed. CURRENT MEDICATIONS       Previous Medications    ALBUTEROL SULFATE  (90 BASE) MCG/ACT INHALER    INHALE 2 PUFFS EVERY 6 HOURS AS NEEDED FOR WHEEZING    AMLODIPINE (NORVASC) 5 MG TABLET    TAKE ONE (1) TABLET(S) EVERY DAY BY ORAL ROUTE FOR 30 DAYS.      ASPIRIN 81 MG CHEWABLE TABLET    Take 1 tablet by mouth daily    CARVEDILOL (COREG) 3.125 MG TABLET    TAKE 1 TABLET BY MOUTH TWICE A DAY WITH MEALS     CITALOPRAM (CELEXA) 20 MG TABLET    TAKE 1 TABLET BY MOUTH DAILY    CLOPIDOGREL (PLAVIX) 75 MG TABLET    Take 1 tablet by mouth daily    FAMOTIDINE (PEPCID) 20 MG TABLET    TAKE 1 TABLET BY MOUTH TWICE A DAY    FUROSEMIDE (LASIX) 40 MG TABLET    TAKE 1 TABLET BY MOUTH TWICE A DAY    GABAPENTIN (NEURONTIN) 800 MG TABLET    Take 800 mg her exam I am also concerned for potential posterior stroke. CT head CT angio head and neck were added on and she was also ordered meclizine. CT imaging of her head and CT angio of her head and neck show moderate to severe stenosis of the proximal left internal carotid artery though this is obscured by motion artifact and otherwise no large vessel occlusion of the head or neck and a largely unremarkable CT head. Given her history and presentation today and as she has not had a stress test or cardiac work-up in a couple of years we will plan for admission for further evaluation for this. FINAL IMPRESSION      1. Chest pain, unspecified type    2. Orthostatic lightheadedness    3.  Stenosis of left carotid artery        DISPOSITION/PLAN   DISPOSITION  admission         (Please note that portions of this note were completed with a voice recognition program. Efforts were made to edit the dictations but occasionally words are mis-transcribed.)    True Urban MD (electronically signed)  Attending Emergency Physician        True Urban MD  02/22/21 1877

## 2021-02-22 NOTE — H&P
Hospital Medicine History & Physical      Patient:  Dae Nobles  :   1955  MRN:   [id number]  Date of Service: 21    CHIEF COMPLAINT:  Light-headedness    HISTORY OF PRESENT ILLNESS:    Dae Nobles is a 72 y.o. female. She awoke this morning feeling light-headed while lying in bed. She had felt normal when she went to bed the night prior. She was too light-headed to sit up or stand up. She denies feeling a sensation of vertigo. She relates she though she might pass out. She called EMS from her bed. Patient denies any visual changes, auditory changes, speech difficulty, dysphagia, and denies any weakness, numbness, or tingling. Has a h/o COPD and still smokes. She has a h/o HTN and takes several antihypertensives but doesn't think there have been any recent changes to her regimen. When asked more about her oral lesion (see below), she relates the left upper side of her mouth feels sore. She can't remember how long this has been going on. She is edentulous and does not wear dentures. Review of Systems:  All pertinent positives and negatives are as noted in the HPI section. All other systems were reviewed and are negative.     Past Medical History:   Diagnosis Date    Asthma     CAD (coronary artery disease)     [facility]     [facility]     Depression     Diabetes mellitus [facility]     Hyperlipidemia     Hypertension     Hypothyroidism     Morbid obesity with BMI of 40.0-44.9, adult ([facility] 2019    Sleep apnea     Substance abuse ([facility]     Thyroid disease     Type 2 diabetes mellitus without complication ([facility]        Past Surgical History:   Procedure Laterality Date    ADENOIDECTOMY      CARDIAC SURGERY       SECTION      x3    CORONARY ANGIOPLASTY WITH STENT PLACEMENT      EYE SURGERY      HERNIA REPAIR      HYSTERECTOMY      SINUS SURGERY      TONSILLECTOMY Prior to Admission medications    Medication Sig Start Date End Date Taking? Authorizing Provider   carvedilol (COREG) 3.125 MG tablet TAKE 1 TABLET BY MOUTH TWICE A DAY WITH MEALS  1/15/21  Yes Isatu Be MD   amLODIPine (NORVASC) 5 MG tablet TAKE ONE (1) TABLET(S) EVERY DAY BY ORAL ROUTE FOR 30 DAYS. 12/14/20  Yes Isatu Be MD   furosemide (LASIX) 40 MG tablet TAKE 1 TABLET BY MOUTH TWICE A DAY 9/8/20  Yes Isatu Be MD   Umeclidinium Bromide (INCRUSE ELLIPTA) 62.5 MCG/INH AEPB Inhale 1 puff into the lungs daily 8/17/20  Yes Tete Salazar DO   nitroGLYCERIN (NITROSTAT) 0.4 MG SL tablet PLACE 1 TABLET UNDER THE TONGUE EVERY 5 MINUTES AS NEEDED FOR CHEST PAIN UP TO MAX OF 3 TOTAL DOSES. IF NO RELIEF AFTER 1 DOSE, CALL 911. 10/9/19  Yes Isatu Be MD   albuterol sulfate  (90 Base) MCG/ACT inhaler INHALE 2 PUFFS EVERY 6 HOURS AS NEEDED FOR WHEEZING 9/18/19  Yes Tomás Jennings MD   clopidogrel (PLAVIX) 75 MG tablet Take 1 tablet by mouth daily 9/18/19  Yes Tomás Jennings MD   metFORMIN (GLUCOPHAGE) 1000 MG tablet Take 1 tablet by mouth 2 times daily (with meals) 4/11/19  Yes Abbey Culp MD   lisinopril (PRINIVIL;ZESTRIL) 10 MG tablet TAKE 1 TABLET BY MOUTH NIGHTLY 4/1/19  Yes Abbey Culp MD   levothyroxine (SYNTHROID) 50 MCG tablet TAKE 1 TABLET BY MOUTH DAILY 4/1/19  Yes Abbey Culp MD   citalopram (CELEXA) 20 MG tablet TAKE 1 TABLET BY MOUTH DAILY 4/1/19  Yes Abbey Culp MD   SYMBICORT 80-4.5 MCG/ACT AERO INHALE 2 PUFFS INTO THE LUNGS 2 TIMES DAILY 2/4/19  Yes Abbey Culp MD   gabapentin (NEURONTIN) 800 MG tablet Take 800 mg by mouth 3 times daily.     Yes Historical Provider, MD   Spacer/Aero-Holding Chambers (E-Z SPACER) ALBA 1 Device by Does not apply route daily 1/29/19  Yes Abbey Culp MD   famotidine (PEPCID) 20 MG tablet TAKE 1 TABLET BY MOUTH TWICE A DAY 12/3/18  Yes Abbey Culp MD   aspirin 81 MG chewable tablet Take 1 tablet by mouth daily 7/24/18  Yes Nori Gómez MD       Allergies:   Oxycodone-acetaminophen, Tramadol, Codeine, and Hydrocodone-acetaminophen    Social:   reports that she quit smoking about 16 months ago. Her smoking use included cigarettes. She has a 56.25 pack-year smoking history. She has never used smokeless tobacco.   reports no history of alcohol use. Social History     Substance and Sexual Activity   Drug Use No       Family History   Problem Relation Age of Onset    Heart Disease Mother     High Blood Pressure Mother     Diabetes Mother     Heart Disease Father     High Blood Pressure Father     Diabetes Father        PHYSICAL EXAM:  I performed this physical examination. Vitals:  Patient Vitals for the past 24 hrs:   BP Temp Temp src Pulse Resp SpO2 Height Weight   02/22/21 1700 121/62 -- -- 83 19 91 % -- --   02/22/21 1645 117/77 -- -- 85 18 93 % -- --   02/22/21 1630 115/67 -- -- 92 23 95 % -- --   02/22/21 1615 106/70 -- -- 81 29 91 % -- --   02/22/21 1600 (!) 126/92 -- -- 79 16 92 % -- --   02/22/21 1545 121/79 -- -- 78 18 91 % -- --   02/22/21 1530 106/67 -- -- 78 14 91 % -- --   02/22/21 1515 107/65 -- -- 77 18 (!) 89 % -- --   02/22/21 1500 95/71 -- -- 74 18 91 % -- --   02/22/21 1445 95/67 -- -- 79 14 96 % -- --   02/22/21 1430 110/70 -- -- 78 18 (!) 87 % -- --   02/22/21 1415 110/62 -- -- 78 14 93 % -- --   02/22/21 1400 104/68 -- -- 77 14 91 % -- --   02/22/21 1345 111/69 -- -- 80 17 92 % -- --   02/22/21 1330 100/66 -- -- 81 14 93 % -- --   02/22/21 1231 112/72 -- -- 77 16 96 % -- --   02/22/21 1216 114/85 -- -- 76 10 95 % -- --   02/22/21 1201 108/77 -- -- 75 14 94 % -- --   02/22/21 1145 112/81 97.8 °F (36.6 °C) Oral 79 15 94 % 4' 11\" (1.499 m) 219 lb 9.3 oz (99.6 kg)     No intake or output data in the 24 hours ending 02/22/21 1753    Vent Settings:  Room air    GEN:  Appearance:  Age appropriate female in NAD . Level of Consciousness:  alert . Orientation:  full    HEENT: Sclera anicteric. no conjunctival chemosis. moist mucus membranes. 1.5-2cm sessile lesion which also may be ulcerating located on the left soft palatine mucosa. NECK:  No signs of meningismus. Jugular veins not distended. Carotid pulses  2+.  no cervical lymphadenopathy. no thyromegaly. CV:  regular rhythm. normal S1 & S2.    no murmur. no rub.  no gallop. PULM:  Chest excursion is symmetric. Breath sounds are globally diminished. Adventitious sounds:  Scattered wheezes    AB:  Abdominal shape is normal.  Bowel sounds are active. Generally soft to palpation. no tenderness is present. no involuntary guarding. no rebound guarding. EXTR:  Skin is warm. Capillary refill brisk. no specific or pathognomic rash. no clubbing. no pitting edema. no active wound or ulcer. NEURO: CN 2-12 intact. Nonfocal exam.    LABS:  Lab Results   Component Value Date    WBC 10.0 02/22/2021    HGB 14.4 02/22/2021    HCT 43.7 02/22/2021    MCV 85.8 02/22/2021     02/22/2021     Lab Results   Component Value Date    CREATININE 0.6 02/22/2021    BUN 15 02/22/2021     02/22/2021    K 4.9 02/22/2021     02/22/2021    CO2 29 02/22/2021     Lab Results   Component Value Date    ALT 9 (L) 02/22/2021    AST 11 (L) 02/22/2021    ALKPHOS 82 02/22/2021    BILITOT 0.4 02/22/2021     Lab Results   Component Value Date    TROPONINI <0.01 02/22/2021     No results for input(s): PHART, UXU0ZZY, PO2ART in the last 72 hours. IMAGING:  Ct Head Wo Contrast    Result Date: 2/22/2021  EXAMINATION: CT OF THE HEAD WITHOUT CONTRAST  2/22/2021 3:28 pm TECHNIQUE: CT of the head was performed without the administration of intravenous contrast. Dose modulation, iterative reconstruction, and/or weight based adjustment of the mA/kV was utilized to reduce the radiation dose to as low as reasonably achievable. COMPARISON: None.  HISTORY: ORDERING SYSTEM PROVIDED HISTORY: unstable, lightheaded TECHNOLOGIST PROVIDED ORDERING SYSTEM PROVIDED HISTORY: unstable, lightheaded TECHNOLOGIST PROVIDED HISTORY: Reason for exam:->unstable, lightheaded Decision Support Exception->Emergency Medical Condition (MA) Reason for Exam: unstable, lightheaded Acuity: Acute Type of Exam: Initial FINDINGS: CTA NECK: The examination is motion degraded. AORTIC ARCH/ARCH VESSELS: No dissection or arterial injury. No significant stenosis of the brachiocephalic or subclavian arteries. CAROTID ARTERIES: There is motion artifact at the carotid bifurcations. The right common and internal carotid arteries are patent. There is a stenosis of the proximal left internal carotid artery just distal to the bifurcation of the common carotid. The stenosis may be moderate or severe but is difficult to accurately assess due to the motion artifact. The remainder of the artery is patent. VERTEBRAL ARTERIES: No dissection, arterial injury, or significant stenosis. SOFT TISSUES: The lung apices are clear. No cervical or superior mediastinal lymphadenopathy. The larynx and pharynx are unremarkable. No acute abnormality of the salivary and thyroid glands. BONES: No acute osseous abnormality. CTA HEAD: ANTERIOR CIRCULATION: No significant stenosis of the intracranial internal carotid, anterior cerebral, or middle cerebral arteries. No aneurysm. POSTERIOR CIRCULATION: No significant stenosis of the vertebral, basilar, or posterior cerebral arteries. No aneurysm. OTHER: No dural venous sinus thrombosis on this non-dedicated study. BRAIN: No mass effect or midline shift. No extra-axial fluid collection. The gray-white differentiation is maintained. Possible moderate or severe stenosis of the proximal left internal carotid artery, which is obscured by motion artifact. No large vessel occlusion in the head or neck. I directly reviewed all recent imaging studies as well as pertinent prior studies.   Radiology reports may or may not be available at the time of my review. EKG:  New and pertinent prior tracings were directly reviewed. My interpretation is as follows:  NSR    Active Hospital Problems    Diagnosis Date Noted   Kristopher Booker headed [R42] 02/22/2021    Morbid obesity with BMI of 40.0-44.9, adult ([facility] [E66.01, Z68.41] 01/29/2019    Tobacco abuse [Z72.0] 07/30/2018    Panlobular emphysema ([facility] [J43.1] 04/10/2017    Type 2 diabetes mellitus ([facility] [E11.9] 03/09/2017    Essential hypertension [I10] 03/09/2017    Acquired hypothyroidism [E03.9] 03/09/2017       ASSESSMENT & PLAN  Light-headedness  -  Patient is presumably orthostatic. She cannot tolerate even sitting up to check orthostatics. Will reduce her antihypertensive regimen to just carvedilol and allow her BP to up-trend. Left SADE, h/o CAD s/p PCI  -  Moderate or severe left internal SADE per CT-A head/neck. Study was of limited quality d/t motion artifacts. This finding is significant in the long term but does not explain patient's light-headedness.  -  Duplex carotid U/S requested to more accurately define severity.  -  Already taking plavix related to prior h/o CAD s/p PCI in 2016. She is supposed to also be taking statin but isn't. She now has two indications so a statin will be started again. Oral lesion  -  My concern is that this lesion is neoplastic. Will request ENT evaluation. COPD, Tobaco abuse  -  Continue home inhaler regimen. Smoking cessation counseled. NRT provided. DM2  -  Hold all oral antidiabetic agents. Start s.c. Insulin regimen based on weight.     DVT prophylaxis: SCDs, lovenox 30 bid  Code Status:  Full  Disposition:  Inpatient    Mynor Burgess MD

## 2021-02-22 NOTE — ED NOTES
Lab called and stated blood was hemolyzed and they will send someone to grab blood work     Vilma Jaramillo RN  02/22/21 5171

## 2021-02-22 NOTE — PROGRESS NOTES
Medication Reconciliation    List of medications patient is currently taking is complete. Source of information: 1. Conversation with patient at bedside                                      2. EPIC records      Allergies  Oxycodone-acetaminophen, Tramadol, Codeine, and Hydrocodone-acetaminophen     Notes regarding home medications:   1. Patient did not receive any of her home medications prior to arrival to the emergency department today.     Cesar Ye PharmD, BCPS  2/22/2021 4:22 PM

## 2021-02-22 NOTE — ED NOTES
New order for orthostatic BP placed, Patient unable to stand even with max assistance      Joleen Salcido, KATH  02/22/21 3230

## 2021-02-22 NOTE — ED PROVIDER NOTES
GABAPENTIN (NEURONTIN) 800 MG TABLET    Take 800 mg by mouth 3 times daily. LEVOTHYROXINE (SYNTHROID) 50 MCG TABLET    TAKE 1 TABLET BY MOUTH DAILY    LISINOPRIL (PRINIVIL;ZESTRIL) 10 MG TABLET    TAKE 1 TABLET BY MOUTH NIGHTLY    METFORMIN (GLUCOPHAGE) 1000 MG TABLET    Take 1 tablet by mouth 2 times daily (with meals)    NITROGLYCERIN (NITROSTAT) 0.4 MG SL TABLET    PLACE 1 TABLET UNDER THE TONGUE EVERY 5 MINUTES AS NEEDED FOR CHEST PAIN UP TO MAX OF 3 TOTAL DOSES. IF NO RELIEF AFTER 1 DOSE, CALL 911. SPACER/AERO-HOLDING CHAMBERS (E-Z SPACER) ALBA    1 Device by Does not apply route daily    SYMBICORT 80-4.5 MCG/ACT AERO    INHALE 2 PUFFS INTO THE LUNGS 2 TIMES DAILY    UMECLIDINIUM BROMIDE (INCRUSE ELLIPTA) 62.5 MCG/INH AEPB    Inhale 1 puff into the lungs daily       ALLERGIES     Oxycodone-acetaminophen, Tramadol, Codeine, and Hydrocodone-acetaminophen    FAMILY HISTORY           Problem Relation Age of Onset    Heart Disease Mother     High Blood Pressure Mother     Diabetes Mother     Heart Disease Father     High Blood Pressure Father     Diabetes Father      Family Status   Relation Name Status    Mother      Father          SOCIAL HISTORY      reports that she quit smoking about 16 months ago. Her smoking use included cigarettes. She has a 56.25 pack-year smoking history. She has never used smokeless tobacco. She reports that she does not drink alcohol or use drugs. PHYSICAL EXAM    (up to 7 for level 4, 8 or more for level 5)     ED Triage Vitals [21 1145]   BP Temp Temp Source Pulse Resp SpO2 Height Weight   112/81 97.8 °F (36.6 °C) Oral 79 15 94 % 4' 11\" (1.499 m) 219 lb 9.3 oz (99.6 kg)       Constitutional:  Appearing well-developed and well-nourished. No distress. HENT:  Normocephalic and atraumatic. Conjunctivae and EOM normal. PERRLA. Neck:  Normal range of motion. No tracheal deviation.    Cardiovascular:  Normal rate, regular rhythm, normal heart sounds, intact distal pulses. Pulmonary/Chest:  Effort and breath sounds normal. No respiratory distress, wheezes or rales. Abdominal:  Soft. Bowel sounds normal. No tenderness, distension, mass, rebound or guarding. Musculoskeletal:  Normal range of motion. No edema exhibited. Neurological:  Alert and oriented to person, place, and time. No cranial nerve deficit observed. Skin:  Skin is warm and dry. Not diaphoretic. Psychiatric:  Normal mood, affect, behavior, judgment, thought content. DIAGNOSTIC RESULTS     RADIOLOGY:   Interpretation per the Radiologist below, if available at the time of this note:    CT Head WO Contrast   Final Result   No acute intracranial process. Mild paranasal sinus disease. CTA HEAD NECK W CONTRAST   Preliminary Result   Possible moderate or severe stenosis of the proximal left internal carotid   artery, which is obscured by motion artifact. No large vessel occlusion in the head or neck.          XR CHEST PORTABLE   Final Result   No active cardiopulmonary disease             LABS:  Labs Reviewed   COMPREHENSIVE METABOLIC PANEL W/ REFLEX TO MG FOR LOW K - Abnormal; Notable for the following components:       Result Value    Glucose 118 (*)     Albumin/Globulin Ratio 1.0 (*)     ALT 9 (*)     AST 11 (*)     All other components within normal limits    Narrative:     Performed at:  [address] [address]   Phone [phone] [place]    Narrative:     Performed at:  [address] [place] [address]   Phone [phone]    Narrative:     Evelia Cox tel. [phone],  Rejected Test CBCWD/Called to: KATH Giles, 02/22/2021 13:31, by  Ad Bower  Performed at:  [address] [place] [address]   Phone [phone]   CBC WITH AUTO DIFFERENTIAL Narrative:     Performed at:  [facility]  [address] [place], [place] [address]   Phone [phone]   TROPONIN    Narrative:     Performed at:  [facility]  [address] [place], [place] [address]   Phone [phone]   BRAIN NATRIURETIC PEPTIDE    Narrative:     Performed at:  [facility]  [address] [place], [place] [address]   Phone [phone]   URINE RT REFLEX TO CULTURE       All other labs were within normal range or not returned as of this dictation. EMERGENCY DEPARTMENT COURSE and DIFFERENTIAL DIAGNOSIS/MDM:   Vitals:    Vitals:    02/22/21 1145 02/22/21 1201 02/22/21 1216 02/22/21 1231   BP: 112/81 108/77 114/85 112/72   Pulse: 79 75 76 77   Resp: 15 14 10 16   Temp: 97.8 °F (36.6 °C)      TempSrc: Oral      SpO2: 94% 94% 95% 96%   Weight: 219 lb 9.3 oz (99.6 kg)      Height: 4' 11\" (1.499 m)          The patient's condition in the ED was good, the patient was afebrile and nontoxic in appearance, and the patient's physical exam was unremarkable. EKG was nonconcerning. Patient was given sublingual nitroglycerin in the ED and reported that her chest pain went away. Last cardiac stress test was September 2019. Lab work-up showed no concerning abnormalities, including a negative troponin and negative COVID-19 test, but the patient was unable to stand and ambulate because she got to unstable and lightheaded. Subsequent CT of the head without contrast and CTA of the head and neck showed some left carotid artery stenosis but no other acute findings. Patient's heart score is 6. She is in need of hospital admission for further care. I consulted the hospitalist, who agreed to accept and admit the patient. PROCEDURES:  None    FINAL IMPRESSION      1. Chest pain, unspecified type    2. Orthostatic lightheadedness    3.  Stenosis of left carotid artery          DISPOSITION/PLAN DISPOSITION Decision To Admit 02/22/2021 03:18:35 PM      PATIENT REFERRED TO:  No follow-up provider specified.     DISCHARGE MEDICATIONS:  New Prescriptions    No medications on file       (Please note that portions of this note were completed with a voice recognition program.  Efforts were made to edit the dictations but occasionally words are mis-transcribed.)    Ranjit Khan, [address] [place] [address]  02/22/21 1303

## 2021-02-22 NOTE — ACP (ADVANCE CARE PLANNING)
no changes to patient's previously recorded wishes  [] New Advance Directive completed  [] Portable Do Not Rescitate prepared for Provider review and signature  [] POLST/POST/MOLST/MOST prepared for Provider review and signature      Follow-up plan:    [] Schedule follow-up conversation to continue planning  [] Referred individual to Provider for additional questions/concerns   [] Advised patient/agent/surrogate to review completed ACP document and update if needed with changes in condition, patient preferences or care setting    [x] This note routed to one or more involved healthcare providers

## 2021-02-23 LAB
ANION GAP SERPL CALCULATED.3IONS-SCNC: 12 MMOL/L (ref 3–16)
BASOPHILS ABSOLUTE: 0 K/UL (ref 0–0.2)
BASOPHILS RELATIVE PERCENT: 0.1 %
BUN BLDV-MCNC: 15 MG/DL (ref 7–20)
CALCIUM SERPL-MCNC: 9.1 MG/DL (ref 8.3–10.6)
CHLORIDE BLD-SCNC: 103 MMOL/L (ref 99–110)
CO2: 20 MMOL/L (ref 21–32)
CREAT SERPL-MCNC: 0.6 MG/DL (ref 0.6–1.2)
EKG ATRIAL RATE: 75 BPM
EKG DIAGNOSIS: NORMAL
EKG P AXIS: 11 DEGREES
EKG P-R INTERVAL: 138 MS
EKG Q-T INTERVAL: 396 MS
EKG QRS DURATION: 72 MS
EKG QTC CALCULATION (BAZETT): 442 MS
EKG R AXIS: -30 DEGREES
EKG T AXIS: 32 DEGREES
EKG VENTRICULAR RATE: 75 BPM
EOSINOPHILS ABSOLUTE: 0 K/UL (ref 0–0.6)
EOSINOPHILS RELATIVE PERCENT: 0.1 %
ESTIMATED AVERAGE GLUCOSE: 122.6 MG/DL
GFR AFRICAN AMERICAN: >60
GFR NON-AFRICAN AMERICAN: >60
GLUCOSE BLD-MCNC: 102 MG/DL (ref 70–99)
GLUCOSE BLD-MCNC: 127 MG/DL (ref 70–99)
GLUCOSE BLD-MCNC: 136 MG/DL (ref 70–99)
GLUCOSE BLD-MCNC: 140 MG/DL (ref 70–99)
GLUCOSE BLD-MCNC: 164 MG/DL (ref 70–99)
HBA1C MFR BLD: 5.9 %
HCT VFR BLD CALC: 42.9 % (ref 36–48)
HEMOGLOBIN: 14 G/DL (ref 12–16)
LYMPHOCYTES ABSOLUTE: 1.9 K/UL (ref 1–5.1)
LYMPHOCYTES RELATIVE PERCENT: 12.6 %
MAGNESIUM: 1.8 MG/DL (ref 1.8–2.4)
MCH RBC QN AUTO: 28.5 PG (ref 26–34)
MCHC RBC AUTO-ENTMCNC: 32.6 G/DL (ref 31–36)
MCV RBC AUTO: 87.5 FL (ref 80–100)
MONOCYTES ABSOLUTE: 0.7 K/UL (ref 0–1.3)
MONOCYTES RELATIVE PERCENT: 4.8 %
NEUTROPHILS ABSOLUTE: 12.4 K/UL (ref 1.7–7.7)
NEUTROPHILS RELATIVE PERCENT: 82.4 %
PDW BLD-RTO: 13.6 % (ref 12.4–15.4)
PERFORMED ON: ABNORMAL
PLATELET # BLD: 267 K/UL (ref 135–450)
PMV BLD AUTO: 9 FL (ref 5–10.5)
POTASSIUM SERPL-SCNC: 3.9 MMOL/L (ref 3.5–5.1)
RBC # BLD: 4.91 M/UL (ref 4–5.2)
SODIUM BLD-SCNC: 135 MMOL/L (ref 136–145)
TSH SERPL DL<=0.05 MIU/L-ACNC: 0.9 UIU/ML (ref 0.27–4.2)
WBC # BLD: 15.1 K/UL (ref 4–11)

## 2021-02-23 PROCEDURE — 2700000000 HC OXYGEN THERAPY PER DAY

## 2021-02-23 PROCEDURE — 83735 ASSAY OF MAGNESIUM: CPT

## 2021-02-23 PROCEDURE — 6370000000 HC RX 637 (ALT 250 FOR IP): Performed by: INTERNAL MEDICINE

## 2021-02-23 PROCEDURE — 83036 HEMOGLOBIN GLYCOSYLATED A1C: CPT

## 2021-02-23 PROCEDURE — 94761 N-INVAS EAR/PLS OXIMETRY MLT: CPT

## 2021-02-23 PROCEDURE — 93880 EXTRACRANIAL BILAT STUDY: CPT

## 2021-02-23 PROCEDURE — 2580000003 HC RX 258: Performed by: INTERNAL MEDICINE

## 2021-02-23 PROCEDURE — 1200000000 HC SEMI PRIVATE

## 2021-02-23 PROCEDURE — 6370000000 HC RX 637 (ALT 250 FOR IP): Performed by: NURSE PRACTITIONER

## 2021-02-23 PROCEDURE — 36415 COLL VENOUS BLD VENIPUNCTURE: CPT

## 2021-02-23 PROCEDURE — 84443 ASSAY THYROID STIM HORMONE: CPT

## 2021-02-23 PROCEDURE — 94640 AIRWAY INHALATION TREATMENT: CPT

## 2021-02-23 PROCEDURE — 93010 ELECTROCARDIOGRAM REPORT: CPT | Performed by: INTERNAL MEDICINE

## 2021-02-23 PROCEDURE — 99222 1ST HOSP IP/OBS MODERATE 55: CPT | Performed by: OTOLARYNGOLOGY

## 2021-02-23 PROCEDURE — 85025 COMPLETE CBC W/AUTO DIFF WBC: CPT

## 2021-02-23 PROCEDURE — 80048 BASIC METABOLIC PNL TOTAL CA: CPT

## 2021-02-23 RX ADMIN — CLOPIDOGREL BISULFATE 75 MG: 75 TABLET ORAL at 01:24

## 2021-02-23 RX ADMIN — FAMOTIDINE 20 MG: 20 TABLET ORAL at 20:51

## 2021-02-23 RX ADMIN — GABAPENTIN 800 MG: 400 CAPSULE ORAL at 01:24

## 2021-02-23 RX ADMIN — FAMOTIDINE 20 MG: 20 TABLET ORAL at 09:29

## 2021-02-23 RX ADMIN — TIOTROPIUM BROMIDE INHALATION SPRAY 2 PUFF: 3.12 SPRAY, METERED RESPIRATORY (INHALATION) at 09:33

## 2021-02-23 RX ADMIN — IPRATROPIUM BROMIDE AND ALBUTEROL SULFATE 1 AMPULE: .5; 3 SOLUTION RESPIRATORY (INHALATION) at 09:33

## 2021-02-23 RX ADMIN — IPRATROPIUM BROMIDE AND ALBUTEROL SULFATE 1 AMPULE: .5; 3 SOLUTION RESPIRATORY (INHALATION) at 12:48

## 2021-02-23 RX ADMIN — CARVEDILOL 3.12 MG: 3.12 TABLET, FILM COATED ORAL at 17:08

## 2021-02-23 RX ADMIN — LEVOTHYROXINE SODIUM 50 MCG: 0.05 TABLET ORAL at 06:40

## 2021-02-23 RX ADMIN — GABAPENTIN 800 MG: 400 CAPSULE ORAL at 09:29

## 2021-02-23 RX ADMIN — IPRATROPIUM BROMIDE AND ALBUTEROL SULFATE 1 AMPULE: .5; 3 SOLUTION RESPIRATORY (INHALATION) at 15:37

## 2021-02-23 RX ADMIN — CLOPIDOGREL BISULFATE 75 MG: 75 TABLET ORAL at 09:29

## 2021-02-23 RX ADMIN — FAMOTIDINE 20 MG: 20 TABLET ORAL at 01:23

## 2021-02-23 RX ADMIN — BUDESONIDE AND FORMOTEROL FUMARATE DIHYDRATE 2 PUFF: 80; 4.5 AEROSOL RESPIRATORY (INHALATION) at 19:49

## 2021-02-23 RX ADMIN — GABAPENTIN 800 MG: 400 CAPSULE ORAL at 14:10

## 2021-02-23 RX ADMIN — GABAPENTIN 800 MG: 400 CAPSULE ORAL at 20:51

## 2021-02-23 RX ADMIN — IPRATROPIUM BROMIDE AND ALBUTEROL SULFATE 1 AMPULE: .5; 3 SOLUTION RESPIRATORY (INHALATION) at 19:49

## 2021-02-23 RX ADMIN — Medication 10 ML: at 09:30

## 2021-02-23 RX ADMIN — BUDESONIDE AND FORMOTEROL FUMARATE DIHYDRATE 2 PUFF: 80; 4.5 AEROSOL RESPIRATORY (INHALATION) at 09:33

## 2021-02-23 RX ADMIN — CARVEDILOL 3.12 MG: 3.12 TABLET, FILM COATED ORAL at 09:34

## 2021-02-23 ASSESSMENT — PAIN SCALES - GENERAL: PAINLEVEL_OUTOF10: 0

## 2021-02-23 NOTE — PROGRESS NOTES
Hospitalist Progress Note    CC: <principal problem not specified>      Admit date: 2/22/2021  Days in hospital:  1    Subjective/interval history: ***    O2 status:    ROS:   {Ros - complete:13475} {DESC; UNCONSCIOUS,VENTILATED,UNABLE TO SPEAK FA:14231}. Objective:    /74   Pulse 76   Temp 97.7 °F (36.5 °C) (Oral)   Resp 18   Ht 4' 11\" (1.499 m)   Wt 222 lb 3.6 oz (100.8 kg)   SpO2 95%   BMI 44.88 kg/m²     Gen: alert, NAD  HEENT: NC/AT, moist mucous membranes, no oropharyngeal erythema or exudate  Neck: supple, trachea midline, no anterior cervical or SC LAD  Heart: Normal s1/s2, RRR, no murmurs, gallops, or rubs. Lungs: clear bilaterally, no wheezing, no rales, no rhonchi, no use of accessory muscles  Abd: bowel sounds present, soft, nontender, nondistended, no masses  Extrem: No clubbing, cyanosis, no edema  Skin: no rashes or lesions  Psych: A & O x3, affect appropriate  Neuro: grossly intact, moves all four extremities spontaneously.   Cap refill: +2 sec    Medications:  Scheduled Meds:   carvedilol  3.125 mg Oral BID WC    citalopram  20 mg Oral Daily    famotidine  20 mg Oral BID    gabapentin  800 mg Oral TID    levothyroxine  50 mcg Oral Daily    budesonide-formoterol  2 puff Inhalation BID    tiotropium  2 puff Inhalation Daily    nicotine  1 patch Transdermal Daily    enoxaparin  30 mg Subcutaneous BID    clopidogrel  75 mg Oral Daily    ipratropium-albuterol  1 ampule Inhalation Q4H WA       PRN Meds:  sodium chloride flush, potassium chloride **OR** potassium alternative oral replacement **OR** potassium chloride, magnesium sulfate, promethazine **OR** ondansetron, acetaminophen **OR** acetaminophen, melatonin, ipratropium-albuterol    IV:      No intake or output data in the 24 hours ending 02/23/21 0928    Results:  CBC:   Recent Labs     02/22/21  1435 02/23/21  0628   WBC 10.0 15.1*   HGB 14.4 14.0   HCT 43.7 42.9   MCV 85.8 87.5    267     BMP: Recent Labs     02/22/21  1435 02/23/21  0628    135*   K 4.9 3.9    103   CO2 29 20*   BUN 15 15   CREATININE 0.6 0.6     Mag: No results for input(s): MAG in the last 72 hours. Phos:   Lab Results   Component Value Date    PHOS 3.6 12/21/2017     No components found for: GLU    LIVER PROFILE:   Recent Labs     02/22/21  1435   AST 11*   ALT 9*   BILITOT 0.4   ALKPHOS 82     PT/INR: No results for input(s): PROTIME, INR in the last 72 hours. APTT: No results for input(s): APTT in the last 72 hours. UA:No results for input(s): NITRITE, COLORU, PHUR, LABCAST, WBCUA, RBCUA, MUCUS, TRICHOMONAS, YEAST, BACTERIA, CLARITYU, SPECGRAV, LEUKOCYTESUR, UROBILINOGEN, BILIRUBINUR, BLOODU, GLUCOSEU, AMORPHOUS in the last 72 hours. Invalid input(s): Lenord Lapping input(s): ABG  Lab Results   Component Value Date    CALCIUM 9.1 02/23/2021    PHOS 3.6 12/21/2017       Assessment:    Active Problems:    Type 2 diabetes mellitus (HCC)    Essential hypertension    Acquired hypothyroidism    Panlobular emphysema (HCC)    Tobacco abuse    Morbid obesity with BMI of 40.0-44.9, adult ([address]    Light headed  Resolved Problems:    * No resolved hospital problems. [facility] course: a 71 yo female admitted with pre-syncope. Plan:  Pre Syncope  - likely due to orthostatic hypotension, couldn't tolerate even sitting up to check orthostatics. - ct head nonacute  - ecg without ischemia or arrhythmias  - tsh normal  - echo 2019 lvef 60%  - check orthostatic vitals  - ivf  - monitor on tele  - reduced bp meds    Left SADE, h/o CAD s/p PCI  -  Moderate or severe left internal SADE per CT-A head/neck. Study was of limited quality d/t motion artifacts. This finding is significant in the long term but does not explain patient's light-headedness.  -  Duplex carotid U/S requested to more accurately define severity.  -  Already taking plavix related to prior h/o CAD s/p PCI in 2016.   She is supposed to also be taking statin but isn't. She now has two indications so a statin will be started again. Oral lesion  -  My concern is that this lesion is neoplastic. Will request ENT evaluation.     COPD, Tobaco abuse  -  Continue home inhaler regimen. Smoking cessation counseled. NRT provided.     Diabetes  - stable  - hold home PO meds  - cont home insulin  - SSI, accuchecks    CAD  - stable  - cont ASA, statin, BB    chronic diastolic systolic CHF  - last ECHO:  - cont BB, ACEi/ARB  - daily wts  - I/Os        Code status:  ***  DVT prophylaxis: [] Lovenox  [] SQ Heparin  [] SCDs because of *** [] warfarin/oral direct thrombin inhibitor [] Encourage ambulation      Disposition:  [] Home [] Rehab [] Psych [] SNF  [] LTAC  [] Transfer to ICU  [] Transfer to PCU [] Other: ***      Electronically signed by Silvia Diaz DO on 2/23/2021 at 9:28 AM

## 2021-02-23 NOTE — PROGRESS NOTES
Physician Progress Note      Lexi Andino  [facility] #:                  [id number]  :                       1955  ADMIT DATE:       2021 11:40 AM  DISCH DATE:  RESPONDING  PROVIDER #:        MAURILIO KEY DO          QUERY TEXT:    Dear Dr Ramiro Tate,    Patient admitted with BMI 44.88. If possible, please document in progress   notes and discharge summary if you are evaluating and /or treating any of the   following: The medical record reflects the following:  Risk Factors: dm  Clinical Indicators: Documentation per nursing of Ht-4'11, Wt-222 lbs and   calculated bmi-44.88  Treatment: monitor, carb control diet  Thank You, Dany Rodríguez RN CDS CRCR  [email or web address]  [phone]  Options provided:  -- Obesity  -- Morbid obesity  -- Overweight  -- BMI not clinically significant  -- Other - I will add my own diagnosis  -- Disagree - Not applicable / Not valid  -- Disagree - Clinically unable to determine / Unknown  -- Refer to Clinical Documentation Reviewer    PROVIDER RESPONSE TEXT:    This patient has morbid obesity. Query created by:  [facility] on 2021 8:43 AM      Electronically signed by:  MAURILIO KEY DO 2021 10:10 AM

## 2021-02-23 NOTE — PROGRESS NOTES
Pt admitted to room 4266. Pt A&O x4. Denies chest pain at this time. Breath sounds easy/even. Lungs clear. Heart rate regular. Abdomen soft, non-distended. Bowel sounds active x 4. Gait unsteady, c/o dizziness and weakness and not being able to stand up without feeling like about to \"pass out. \" Call light within reach.

## 2021-02-23 NOTE — CONSULTS
Farhan      Patient Name: Elvis [facility] Record Number:  [id number]  Primary Care Physician:  TERESA Mueller NP  Date of Consultation: 2/23/2021    Chief Complaint: Palatal mass        HISTORY OF PRESENT ILLNESS  Alcario Cockayne is a(n) 72 y.o. female who was admitted yesterday with lightheadedness. During her exam a lesion on the soft palate was noted. ENT was called to evaluate. The patient says she had no idea if there was something back there, but has noted some pain in the back of her throat the past month. She is unsure if it is enlarging as she really did not know that it existed. She has a smoker. She is never had head neck cancer. She denies any neck masses. She denies any unintentional weight loss. REVIEW OF SYSTEMS  As above    PHYSICAL EXAM  GENERAL: No Acute Distress, Alert and Oriented, no Hoarseness, strong voice  EYES: EOMI, Anti-icteric  HENT:   Head: Normocephalic and atraumatic. Face:  Symmetric, facial nerve intact, no sinus tenderness  Mouth/Oral Cavity: Patient is edentulous. On the posterior aspect of the soft palate on the left side is a 1-1/2 cm ulcerative area with raised borders. I do not appreciate any other mucosal lesions  Oropharynx/Larynx: As above  Nose:Normal external nasal appearance. Anterior rhinoscopy shows a normal septum. Normal turbinates.   Normal mucosa   NECK: Normal range of motion, no thyromegaly, trachea is midline, no lymphadenopathy, no neck masses, no crepitus  CHEST: Normal respiratory effort, no retractions, breathing comfortably  SKIN: No rashes, normal appearing skin, no evidence of skin lesions/tumors  Neuro:  cranial nerve II-XII intact; normal gait  Cardio:  no edema        RADIOLOGY  Summary of findings:  I reviewed the patient's CTA and I cannot clearly see the palatal lesion, but the patient's mouth is closed and the tongue is touching the palate so would

## 2021-02-23 NOTE — PLAN OF CARE
Problem: Falls - Risk of:  Goal: Will remain free from falls  Description: Will remain free from falls  Outcome: Ongoing  Note: Fall risk preventions in place. Bed in lowest position, call light within reach, non-skid socks on when ambulating, bed alarm on, bed wheels locked. Pt. Educated and agreeable on usage of call light before ambulation.        Problem: Falls - Risk of:  Goal: Absence of physical injury  Description: Absence of physical injury  Outcome: Ongoing     Problem: OXYGENATION/RESPIRATORY FUNCTION  Goal: Patient will maintain patent airway  Outcome: Ongoing     Problem: OXYGENATION/RESPIRATORY FUNCTION  Goal: Patient will achieve/maintain normal respiratory rate/effort  Description: Respiratory rate and effort will be within normal limits for the patient  Outcome: Ongoing     Problem: HEMODYNAMIC STATUS  Goal: Patient has stable vital signs and fluid balance  Outcome: Ongoing     Problem: FLUID AND ELECTROLYTE IMBALANCE  Goal: Fluid and electrolyte balance are achieved/maintained  Outcome: Ongoing     Problem: ACTIVITY INTOLERANCE/IMPAIRED MOBILITY  Goal: Mobility/activity is maintained at optimum level for patient  Outcome: Ongoing

## 2021-02-24 ENCOUNTER — ANESTHESIA EVENT (OUTPATIENT)
Dept: OPERATING ROOM | Age: 66
DRG: 147 | End: 2021-02-24
Payer: COMMERCIAL

## 2021-02-24 LAB
ANION GAP SERPL CALCULATED.3IONS-SCNC: 11 MMOL/L (ref 3–16)
BASOPHILS ABSOLUTE: 0 K/UL (ref 0–0.2)
BASOPHILS RELATIVE PERCENT: 0.5 %
BUN BLDV-MCNC: 16 MG/DL (ref 7–20)
CALCIUM SERPL-MCNC: 9.1 MG/DL (ref 8.3–10.6)
CHLORIDE BLD-SCNC: 106 MMOL/L (ref 99–110)
CO2: 25 MMOL/L (ref 21–32)
CREAT SERPL-MCNC: 0.8 MG/DL (ref 0.6–1.2)
EOSINOPHILS ABSOLUTE: 0.2 K/UL (ref 0–0.6)
EOSINOPHILS RELATIVE PERCENT: 2.4 %
GFR AFRICAN AMERICAN: >60
GFR NON-AFRICAN AMERICAN: >60
GLUCOSE BLD-MCNC: 113 MG/DL (ref 70–99)
GLUCOSE BLD-MCNC: 134 MG/DL (ref 70–99)
GLUCOSE BLD-MCNC: 81 MG/DL (ref 70–99)
GLUCOSE BLD-MCNC: 93 MG/DL (ref 70–99)
GLUCOSE BLD-MCNC: 94 MG/DL (ref 70–99)
HCT VFR BLD CALC: 42.2 % (ref 36–48)
HEMOGLOBIN: 13.9 G/DL (ref 12–16)
LYMPHOCYTES ABSOLUTE: 2.7 K/UL (ref 1–5.1)
LYMPHOCYTES RELATIVE PERCENT: 29.9 %
MAGNESIUM: 1.8 MG/DL (ref 1.8–2.4)
MCH RBC QN AUTO: 28.3 PG (ref 26–34)
MCHC RBC AUTO-ENTMCNC: 32.9 G/DL (ref 31–36)
MCV RBC AUTO: 86.1 FL (ref 80–100)
MONOCYTES ABSOLUTE: 0.7 K/UL (ref 0–1.3)
MONOCYTES RELATIVE PERCENT: 8.1 %
NEUTROPHILS ABSOLUTE: 5.3 K/UL (ref 1.7–7.7)
NEUTROPHILS RELATIVE PERCENT: 59.1 %
PDW BLD-RTO: 13.9 % (ref 12.4–15.4)
PERFORMED ON: ABNORMAL
PERFORMED ON: ABNORMAL
PERFORMED ON: NORMAL
PERFORMED ON: NORMAL
PLATELET # BLD: 282 K/UL (ref 135–450)
PMV BLD AUTO: 8.8 FL (ref 5–10.5)
POTASSIUM SERPL-SCNC: 4.3 MMOL/L (ref 3.5–5.1)
RBC # BLD: 4.9 M/UL (ref 4–5.2)
SODIUM BLD-SCNC: 142 MMOL/L (ref 136–145)
WBC # BLD: 9 K/UL (ref 4–11)

## 2021-02-24 PROCEDURE — 85025 COMPLETE CBC W/AUTO DIFF WBC: CPT

## 2021-02-24 PROCEDURE — 36415 COLL VENOUS BLD VENIPUNCTURE: CPT

## 2021-02-24 PROCEDURE — 6370000000 HC RX 637 (ALT 250 FOR IP): Performed by: NURSE PRACTITIONER

## 2021-02-24 PROCEDURE — 6370000000 HC RX 637 (ALT 250 FOR IP): Performed by: INTERNAL MEDICINE

## 2021-02-24 PROCEDURE — 94640 AIRWAY INHALATION TREATMENT: CPT

## 2021-02-24 PROCEDURE — 99231 SBSQ HOSP IP/OBS SF/LOW 25: CPT | Performed by: OTOLARYNGOLOGY

## 2021-02-24 PROCEDURE — 83735 ASSAY OF MAGNESIUM: CPT

## 2021-02-24 PROCEDURE — 1200000000 HC SEMI PRIVATE

## 2021-02-24 PROCEDURE — 94761 N-INVAS EAR/PLS OXIMETRY MLT: CPT

## 2021-02-24 PROCEDURE — 80048 BASIC METABOLIC PNL TOTAL CA: CPT

## 2021-02-24 PROCEDURE — 2700000000 HC OXYGEN THERAPY PER DAY

## 2021-02-24 RX ORDER — FUROSEMIDE 40 MG/1
40 TABLET ORAL DAILY
Status: DISCONTINUED | OUTPATIENT
Start: 2021-02-24 | End: 2021-03-02 | Stop reason: HOSPADM

## 2021-02-24 RX ADMIN — CARVEDILOL 3.12 MG: 3.12 TABLET, FILM COATED ORAL at 10:23

## 2021-02-24 RX ADMIN — BUDESONIDE AND FORMOTEROL FUMARATE DIHYDRATE 2 PUFF: 80; 4.5 AEROSOL RESPIRATORY (INHALATION) at 08:53

## 2021-02-24 RX ADMIN — FUROSEMIDE 40 MG: 40 TABLET ORAL at 11:50

## 2021-02-24 RX ADMIN — BUDESONIDE AND FORMOTEROL FUMARATE DIHYDRATE 2 PUFF: 80; 4.5 AEROSOL RESPIRATORY (INHALATION) at 20:25

## 2021-02-24 RX ADMIN — IPRATROPIUM BROMIDE AND ALBUTEROL SULFATE 1 AMPULE: .5; 3 SOLUTION RESPIRATORY (INHALATION) at 12:25

## 2021-02-24 RX ADMIN — IPRATROPIUM BROMIDE AND ALBUTEROL SULFATE 1 AMPULE: .5; 3 SOLUTION RESPIRATORY (INHALATION) at 20:25

## 2021-02-24 RX ADMIN — CITALOPRAM HYDROBROMIDE 20 MG: 20 TABLET ORAL at 10:23

## 2021-02-24 RX ADMIN — LEVOTHYROXINE SODIUM 50 MCG: 0.05 TABLET ORAL at 06:45

## 2021-02-24 RX ADMIN — FAMOTIDINE 20 MG: 20 TABLET ORAL at 10:23

## 2021-02-24 RX ADMIN — IPRATROPIUM BROMIDE AND ALBUTEROL SULFATE 1 AMPULE: .5; 3 SOLUTION RESPIRATORY (INHALATION) at 08:53

## 2021-02-24 RX ADMIN — GABAPENTIN 800 MG: 400 CAPSULE ORAL at 13:51

## 2021-02-24 RX ADMIN — CARVEDILOL 3.12 MG: 3.12 TABLET, FILM COATED ORAL at 17:31

## 2021-02-24 RX ADMIN — FAMOTIDINE 20 MG: 20 TABLET ORAL at 20:35

## 2021-02-24 RX ADMIN — GABAPENTIN 800 MG: 400 CAPSULE ORAL at 20:35

## 2021-02-24 RX ADMIN — TIOTROPIUM BROMIDE INHALATION SPRAY 2 PUFF: 3.12 SPRAY, METERED RESPIRATORY (INHALATION) at 08:53

## 2021-02-24 RX ADMIN — GABAPENTIN 800 MG: 400 CAPSULE ORAL at 10:23

## 2021-02-24 RX ADMIN — IPRATROPIUM BROMIDE AND ALBUTEROL SULFATE 1 AMPULE: .5; 3 SOLUTION RESPIRATORY (INHALATION) at 15:45

## 2021-02-24 RX ADMIN — CLOPIDOGREL BISULFATE 75 MG: 75 TABLET ORAL at 10:23

## 2021-02-24 ASSESSMENT — PAIN SCALES - GENERAL: PAINLEVEL_OUTOF10: 0

## 2021-02-24 NOTE — PROGRESS NOTES
Hospitalist Progress Note      PCP: Cecelia March, APRN - NP    Date of Admission: 2/22/2021    Chief Complaint: lightheaded. Subjective:     Lightheadedness resolved. Feels better. Medications:  Reviewed    Infusion Medications   Scheduled Medications    furosemide  40 mg Oral Daily    carvedilol  3.125 mg Oral BID WC    citalopram  20 mg Oral Daily    famotidine  20 mg Oral BID    gabapentin  800 mg Oral TID    levothyroxine  50 mcg Oral Daily    budesonide-formoterol  2 puff Inhalation BID    tiotropium  2 puff Inhalation Daily    nicotine  1 patch Transdermal Daily    enoxaparin  30 mg Subcutaneous BID    clopidogrel  75 mg Oral Daily    ipratropium-albuterol  1 ampule Inhalation Q4H WA     PRN Meds: sodium chloride flush, potassium chloride **OR** potassium alternative oral replacement **OR** potassium chloride, magnesium sulfate, promethazine **OR** ondansetron, acetaminophen **OR** acetaminophen, melatonin, ipratropium-albuterol      Intake/Output Summary (Last 24 hours) at 2/24/2021 1647  Last data filed at 2/24/2021 1331  Gross per 24 hour   Intake 1200 ml   Output --   Net 1200 ml       Physical Exam Performed:    /76   Pulse 76   Temp 97.9 °F (36.6 °C)   Resp 16   Ht 4' 11\" (1.499 m)   Wt 222 lb 3.6 oz (100.8 kg)   SpO2 94%   BMI 44.88 kg/m²     General appearance: No apparent distress, appears stated age and cooperative. HEENT: Pupils equal, round, and reactive to light. Conjunctivae/corneas clear. Neck: Supple, with full range of motion. No jugular venous distention. Trachea midline. Respiratory:  Normal respiratory effort. Clear to auscultation, bilaterally without Rales/Wheezes/Rhonchi. Cardiovascular: Regular rate and rhythm with normal S1/S2 without murmurs, rubs or gallops. Abdomen: Soft, non-tender, non-distended with normal bowel sounds. Musculoskeletal: No clubbing, cyanosis or edema bilaterally.   Full range of motion without deformity. Skin: Skin color, texture, turgor normal.  No rashes or lesions. Neurologic:  Neurovascularly intact without any focal sensory/motor deficits. Cranial nerves: II-XII intact, grossly non-focal.  Psychiatric: Alert and oriented, thought content appropriate, normal insight  Capillary Refill: Brisk,< 3 seconds   Peripheral Pulses: +2 palpable, equal bilaterally       Labs:   Recent Labs     02/22/21  1435 02/23/21  0628 02/24/21  0702   WBC 10.0 15.1* 9.0   HGB 14.4 14.0 13.9   HCT 43.7 42.9 42.2    267 282     Recent Labs     02/22/21  1435 02/23/21  0628 02/24/21  0702    135* 142   K 4.9 3.9 4.3    103 106   CO2 29 20* 25   BUN 15 15 16   CREATININE 0.6 0.6 0.8   CALCIUM 9.8 9.1 9.1     Recent Labs     02/22/21  1435   AST 11*   ALT 9*   BILITOT 0.4   ALKPHOS 82     No results for input(s): INR in the last 72 hours. Recent Labs     02/22/21  1435   TROPONINI <0.01       Urinalysis:      Lab Results   Component Value Date    NITRU Negative 04/04/2020    WBCUA 0-2 03/04/2017    RBCUA 0-2 03/04/2017    BLOODU Negative 04/04/2020    SPECGRAV 1.009 04/04/2020    GLUCOSEU Negative 04/04/2020       Radiology:  VL DUP CAROTID BILATERAL   Final Result      CT Head WO Contrast   Final Result   No acute intracranial process. Mild paranasal sinus disease. CTA HEAD NECK W CONTRAST   Final Result   Possible moderate or severe stenosis of the proximal left internal carotid   artery, which is obscured by motion artifact. No large vessel occlusion in the head or neck.          XR CHEST PORTABLE   Final Result   No active cardiopulmonary disease                 Assessment/Plan:    Active Hospital Problems    Diagnosis    Light headed [R42]    Morbid obesity with BMI of 40.0-44.9, adult ([facility] [address] [E66.01, Z68.41]    Tobacco abuse [Z72.0]    Panlobular emphysema ([facility] [address] [J43.1]    Type 2 diabetes mellitus ([facility] [address] [E11.9]    Essential hypertension [I10]    Acquired hypothyroidism [E03.9] Light-headedness  -  Patient is orthostatic. She cannot tolerate even sitting up to check orthostatics. - this has resolved with hold her BP meds. - will resume lasix PO daily today.        Left SADE, h/o CAD s/p PCI  -  Moderate or severe left internal SADE per CT-A head/neck. Study was of limited quality d/t motion artifacts. This finding is significant in the long term but does not explain patient's light-headedness.  -  Duplex carotid U/S requested to more accurately define severity.  -  Already taking plavix related to prior h/o CAD s/p PCI in 2016. She is supposed to also be taking statin but isn't. Resumed statin.        Oral lesion concern for malignancy. -  ENT involved. - this needs to be biopsied.        COPD, Tobaco abuse  -  Continue home inhaler regimen. Smoking cessation counseled. NRT provided.       DM2  -  Hold all oral antidiabetic agents. Start s.c. Insulin regimen based on weight. DVT Prophylaxis: lovenox  Diet: DIET CARB CONTROL; Low Sodium (2 GM);  Daily Fluid Restriction: 2000 ml  Code Status: Full Code      Dispo - cc    Minus MD Elisha

## 2021-02-24 NOTE — PLAN OF CARE
Problem: Falls - Risk of:  Goal: Will remain free from falls  Description: Will remain free from falls  2/23/2021 2145 by Shirlene Sorto RN  Outcome: Ongoing  2/23/2021 0801 by Rosanne Vega RN  Outcome: Ongoing  Note: Fall risk preventions in place. Bed in lowest position, call light within reach, non-skid socks on when ambulating, bed alarm on, bed wheels locked. Pt. Educated and agreeable on usage of call light before ambulation.     Goal: Absence of physical injury  Description: Absence of physical injury  2/23/2021 2145 by Shirlene Sorto RN  Outcome: Ongoing  2/23/2021 0801 by Rosanne Vega RN  Outcome: Ongoing     Problem: OXYGENATION/RESPIRATORY FUNCTION  Goal: Patient will maintain patent airway  2/23/2021 2145 by Shirlene Sorto RN  Outcome: Ongoing  2/23/2021 0801 by Rosanne Vega RN  Outcome: Ongoing  Goal: Patient will achieve/maintain normal respiratory rate/effort  Description: Respiratory rate and effort will be within normal limits for the patient  2/23/2021 2145 by Shirlene Sorto RN  Outcome: Ongoing  2/23/2021 0801 by Rosanne Vega RN  Outcome: Ongoing     Problem: HEMODYNAMIC STATUS  Goal: Patient has stable vital signs and fluid balance  2/23/2021 2145 by Shirlene Sorto RN  Outcome: Ongoing  2/23/2021 0801 by Rosanne Vega RN  Outcome: Ongoing     Problem: FLUID AND ELECTROLYTE IMBALANCE  Goal: Fluid and electrolyte balance are achieved/maintained  2/23/2021 2145 by Shirlene Sorto RN  Outcome: Ongoing  2/23/2021 0801 by Rosanne Vega RN  Outcome: Ongoing     Problem: ACTIVITY INTOLERANCE/IMPAIRED MOBILITY  Goal: Mobility/activity is maintained at optimum level for patient  2/23/2021 2145 by Shirlene Sorto RN  Outcome: Ongoing  2/23/2021 0801 by Rosanne Vega RN  Outcome: Ongoing

## 2021-02-24 NOTE — PROGRESS NOTES
Pt very adamant about taking a shower and started taking telemetry monitor off. [facility] called to update that patient will be on standby while in shower. IV covered up while in shower. After shower, telemetry monitor placed back on patient.

## 2021-02-24 NOTE — PROGRESS NOTES
No significant changes overnight. Exam shows a stable ulcer of the left palate    Plan  Patient has a large ulcerative lesion of the left posterior soft palate. This needs to be biopsied. I will tentatively plan to do this tomorrow at 1230 as internal medicine has cleared her for this. Also do a nasal endoscopy and a direct laryngoscopy to rule out any other lesions. Patient should be n.p.o. at midnight. Patient understands the risk of the surgery including bleeding given that she is on blood thinners. She has agreed to proceed.

## 2021-02-25 ENCOUNTER — ANESTHESIA (OUTPATIENT)
Dept: OPERATING ROOM | Age: 66
DRG: 147 | End: 2021-02-25
Payer: COMMERCIAL

## 2021-02-25 VITALS
RESPIRATION RATE: 7 BRPM | SYSTOLIC BLOOD PRESSURE: 180 MMHG | TEMPERATURE: 96.8 F | DIASTOLIC BLOOD PRESSURE: 74 MMHG | OXYGEN SATURATION: 99 %

## 2021-02-25 LAB
ANION GAP SERPL CALCULATED.3IONS-SCNC: 9 MMOL/L (ref 3–16)
BASOPHILS ABSOLUTE: 0 K/UL (ref 0–0.2)
BASOPHILS RELATIVE PERCENT: 0.5 %
BILIRUBIN URINE: NEGATIVE
BLOOD, URINE: NEGATIVE
BUN BLDV-MCNC: 20 MG/DL (ref 7–20)
CALCIUM SERPL-MCNC: 9.1 MG/DL (ref 8.3–10.6)
CHLORIDE BLD-SCNC: 101 MMOL/L (ref 99–110)
CLARITY: CLEAR
CO2: 28 MMOL/L (ref 21–32)
COLOR: YELLOW
CREAT SERPL-MCNC: 0.8 MG/DL (ref 0.6–1.2)
EOSINOPHILS ABSOLUTE: 0.3 K/UL (ref 0–0.6)
EOSINOPHILS RELATIVE PERCENT: 3.4 %
GFR AFRICAN AMERICAN: >60
GFR NON-AFRICAN AMERICAN: >60
GLUCOSE BLD-MCNC: 81 MG/DL (ref 70–99)
GLUCOSE BLD-MCNC: 88 MG/DL (ref 70–99)
GLUCOSE BLD-MCNC: 92 MG/DL (ref 70–99)
GLUCOSE BLD-MCNC: 94 MG/DL (ref 70–99)
GLUCOSE URINE: NEGATIVE MG/DL
HCT VFR BLD CALC: 42.4 % (ref 36–48)
HEMOGLOBIN: 13.9 G/DL (ref 12–16)
KETONES, URINE: NEGATIVE MG/DL
LEUKOCYTE ESTERASE, URINE: NEGATIVE
LYMPHOCYTES ABSOLUTE: 2.6 K/UL (ref 1–5.1)
LYMPHOCYTES RELATIVE PERCENT: 31.9 %
MAGNESIUM: 1.7 MG/DL (ref 1.8–2.4)
MCH RBC QN AUTO: 28.2 PG (ref 26–34)
MCHC RBC AUTO-ENTMCNC: 32.9 G/DL (ref 31–36)
MCV RBC AUTO: 85.6 FL (ref 80–100)
MICROSCOPIC EXAMINATION: NORMAL
MONOCYTES ABSOLUTE: 0.8 K/UL (ref 0–1.3)
MONOCYTES RELATIVE PERCENT: 9.3 %
NEUTROPHILS ABSOLUTE: 4.5 K/UL (ref 1.7–7.7)
NEUTROPHILS RELATIVE PERCENT: 54.9 %
NITRITE, URINE: NEGATIVE
PDW BLD-RTO: 13.7 % (ref 12.4–15.4)
PERFORMED ON: NORMAL
PH UA: 5.5 (ref 5–8)
PLATELET # BLD: 272 K/UL (ref 135–450)
PMV BLD AUTO: 8.9 FL (ref 5–10.5)
POTASSIUM SERPL-SCNC: 4.1 MMOL/L (ref 3.5–5.1)
PROTEIN UA: NEGATIVE MG/DL
RBC # BLD: 4.95 M/UL (ref 4–5.2)
SODIUM BLD-SCNC: 138 MMOL/L (ref 136–145)
SPECIFIC GRAVITY UA: 1.02 (ref 1–1.03)
URINE REFLEX TO CULTURE: NORMAL
URINE TYPE: NORMAL
UROBILINOGEN, URINE: 0.2 E.U./DL
WBC # BLD: 8.2 K/UL (ref 4–11)

## 2021-02-25 PROCEDURE — 97162 PT EVAL MOD COMPLEX 30 MIN: CPT

## 2021-02-25 PROCEDURE — 6360000002 HC RX W HCPCS: Performed by: ANESTHESIOLOGY

## 2021-02-25 PROCEDURE — 6370000000 HC RX 637 (ALT 250 FOR IP): Performed by: INTERNAL MEDICINE

## 2021-02-25 PROCEDURE — 97535 SELF CARE MNGMENT TRAINING: CPT

## 2021-02-25 PROCEDURE — 7100000000 HC PACU RECOVERY - FIRST 15 MIN: Performed by: OTOLARYNGOLOGY

## 2021-02-25 PROCEDURE — 1200000000 HC SEMI PRIVATE

## 2021-02-25 PROCEDURE — 31231 NASAL ENDOSCOPY DX: CPT | Performed by: OTOLARYNGOLOGY

## 2021-02-25 PROCEDURE — 80048 BASIC METABOLIC PNL TOTAL CA: CPT

## 2021-02-25 PROCEDURE — 2580000003 HC RX 258: Performed by: ANESTHESIOLOGY

## 2021-02-25 PROCEDURE — 94640 AIRWAY INHALATION TREATMENT: CPT

## 2021-02-25 PROCEDURE — 2709999900 HC NON-CHARGEABLE SUPPLY: Performed by: OTOLARYNGOLOGY

## 2021-02-25 PROCEDURE — 85025 COMPLETE CBC W/AUTO DIFF WBC: CPT

## 2021-02-25 PROCEDURE — 99223 1ST HOSP IP/OBS HIGH 75: CPT | Performed by: INTERNAL MEDICINE

## 2021-02-25 PROCEDURE — 0CBM3ZX EXCISION OF PHARYNX, PERCUTANEOUS APPROACH, DIAGNOSTIC: ICD-10-PCS | Performed by: OTOLARYNGOLOGY

## 2021-02-25 PROCEDURE — 81003 URINALYSIS AUTO W/O SCOPE: CPT

## 2021-02-25 PROCEDURE — 97530 THERAPEUTIC ACTIVITIES: CPT

## 2021-02-25 PROCEDURE — 0CJS8ZZ INSPECTION OF LARYNX, VIA NATURAL OR ARTIFICIAL OPENING ENDOSCOPIC: ICD-10-PCS | Performed by: INTERNAL MEDICINE

## 2021-02-25 PROCEDURE — 97166 OT EVAL MOD COMPLEX 45 MIN: CPT

## 2021-02-25 PROCEDURE — 7100000001 HC PACU RECOVERY - ADDTL 15 MIN: Performed by: OTOLARYNGOLOGY

## 2021-02-25 PROCEDURE — 3600000004 HC SURGERY LEVEL 4 BASE: Performed by: OTOLARYNGOLOGY

## 2021-02-25 PROCEDURE — 2580000003 HC RX 258: Performed by: INTERNAL MEDICINE

## 2021-02-25 PROCEDURE — 2580000003 HC RX 258: Performed by: OTOLARYNGOLOGY

## 2021-02-25 PROCEDURE — 42100 BIOPSY ROOF OF MOUTH: CPT | Performed by: OTOLARYNGOLOGY

## 2021-02-25 PROCEDURE — 36415 COLL VENOUS BLD VENIPUNCTURE: CPT

## 2021-02-25 PROCEDURE — 3700000001 HC ADD 15 MINUTES (ANESTHESIA): Performed by: OTOLARYNGOLOGY

## 2021-02-25 PROCEDURE — 88342 IMHCHEM/IMCYTCHM 1ST ANTB: CPT

## 2021-02-25 PROCEDURE — 94761 N-INVAS EAR/PLS OXIMETRY MLT: CPT

## 2021-02-25 PROCEDURE — 3700000000 HC ANESTHESIA ATTENDED CARE: Performed by: OTOLARYNGOLOGY

## 2021-02-25 PROCEDURE — 2700000000 HC OXYGEN THERAPY PER DAY

## 2021-02-25 PROCEDURE — 31525 DX LARYNGOSCOPY EXCL NB: CPT | Performed by: OTOLARYNGOLOGY

## 2021-02-25 PROCEDURE — 2500000003 HC RX 250 WO HCPCS: Performed by: ANESTHESIOLOGY

## 2021-02-25 PROCEDURE — 6360000002 HC RX W HCPCS: Performed by: OTOLARYNGOLOGY

## 2021-02-25 PROCEDURE — 88305 TISSUE EXAM BY PATHOLOGIST: CPT

## 2021-02-25 PROCEDURE — 6370000000 HC RX 637 (ALT 250 FOR IP): Performed by: NURSE PRACTITIONER

## 2021-02-25 PROCEDURE — 3600000014 HC SURGERY LEVEL 4 ADDTL 15MIN: Performed by: OTOLARYNGOLOGY

## 2021-02-25 PROCEDURE — 83735 ASSAY OF MAGNESIUM: CPT

## 2021-02-25 RX ORDER — FENTANYL CITRATE 50 UG/ML
25 INJECTION, SOLUTION INTRAMUSCULAR; INTRAVENOUS EVERY 5 MIN PRN
Status: DISCONTINUED | OUTPATIENT
Start: 2021-02-25 | End: 2021-02-25 | Stop reason: HOSPADM

## 2021-02-25 RX ORDER — PROPOFOL 10 MG/ML
INJECTION, EMULSION INTRAVENOUS PRN
Status: DISCONTINUED | OUTPATIENT
Start: 2021-02-25 | End: 2021-02-25 | Stop reason: SDUPTHER

## 2021-02-25 RX ORDER — ONDANSETRON 2 MG/ML
INJECTION INTRAMUSCULAR; INTRAVENOUS PRN
Status: DISCONTINUED | OUTPATIENT
Start: 2021-02-25 | End: 2021-02-25 | Stop reason: SDUPTHER

## 2021-02-25 RX ORDER — MIDAZOLAM HYDROCHLORIDE 1 MG/ML
INJECTION INTRAMUSCULAR; INTRAVENOUS PRN
Status: DISCONTINUED | OUTPATIENT
Start: 2021-02-25 | End: 2021-02-25 | Stop reason: SDUPTHER

## 2021-02-25 RX ORDER — OXYCODONE HYDROCHLORIDE AND ACETAMINOPHEN 5; 325 MG/1; MG/1
2 TABLET ORAL PRN
Status: DISCONTINUED | OUTPATIENT
Start: 2021-02-25 | End: 2021-02-25 | Stop reason: HOSPADM

## 2021-02-25 RX ORDER — OXYCODONE HYDROCHLORIDE AND ACETAMINOPHEN 5; 325 MG/1; MG/1
1 TABLET ORAL PRN
Status: DISCONTINUED | OUTPATIENT
Start: 2021-02-25 | End: 2021-02-25 | Stop reason: HOSPADM

## 2021-02-25 RX ORDER — CARVEDILOL 25 MG/1
25 TABLET ORAL 2 TIMES DAILY WITH MEALS
Status: DISCONTINUED | OUTPATIENT
Start: 2021-02-26 | End: 2021-03-01

## 2021-02-25 RX ORDER — PHENYLEPHRINE HCL IN 0.9% NACL 1 MG/10 ML
SYRINGE (ML) INTRAVENOUS PRN
Status: DISCONTINUED | OUTPATIENT
Start: 2021-02-25 | End: 2021-02-25 | Stop reason: SDUPTHER

## 2021-02-25 RX ORDER — SODIUM CHLORIDE 0.9 % (FLUSH) 0.9 %
10 SYRINGE (ML) INJECTION PRN
Status: DISCONTINUED | OUTPATIENT
Start: 2021-02-25 | End: 2021-02-25 | Stop reason: HOSPADM

## 2021-02-25 RX ORDER — DEXAMETHASONE SODIUM PHOSPHATE 4 MG/ML
INJECTION, SOLUTION INTRA-ARTICULAR; INTRALESIONAL; INTRAMUSCULAR; INTRAVENOUS; SOFT TISSUE PRN
Status: DISCONTINUED | OUTPATIENT
Start: 2021-02-25 | End: 2021-02-25 | Stop reason: SDUPTHER

## 2021-02-25 RX ORDER — AMIODARONE HYDROCHLORIDE 200 MG/1
200 TABLET ORAL 2 TIMES DAILY
Status: DISCONTINUED | OUTPATIENT
Start: 2021-02-25 | End: 2021-03-02 | Stop reason: HOSPADM

## 2021-02-25 RX ORDER — EPHEDRINE SULFATE/0.9% NACL/PF 50 MG/5 ML
SYRINGE (ML) INTRAVENOUS PRN
Status: DISCONTINUED | OUTPATIENT
Start: 2021-02-25 | End: 2021-02-25 | Stop reason: SDUPTHER

## 2021-02-25 RX ORDER — SODIUM CHLORIDE 9 MG/ML
INJECTION, SOLUTION INTRAVENOUS CONTINUOUS
Status: DISCONTINUED | OUTPATIENT
Start: 2021-02-25 | End: 2021-02-27

## 2021-02-25 RX ORDER — OXYMETAZOLINE HYDROCHLORIDE 0.05 G/100ML
SPRAY NASAL
Status: DISCONTINUED | OUTPATIENT
Start: 2021-02-25 | End: 2021-02-25 | Stop reason: ALTCHOICE

## 2021-02-25 RX ORDER — GABAPENTIN 300 MG/1
300 CAPSULE ORAL 3 TIMES DAILY
Status: DISCONTINUED | OUTPATIENT
Start: 2021-02-25 | End: 2021-02-26

## 2021-02-25 RX ORDER — SODIUM CHLORIDE 0.9 % (FLUSH) 0.9 %
10 SYRINGE (ML) INJECTION EVERY 12 HOURS SCHEDULED
Status: DISCONTINUED | OUTPATIENT
Start: 2021-02-25 | End: 2021-02-25 | Stop reason: HOSPADM

## 2021-02-25 RX ORDER — DEXAMETHASONE SODIUM PHOSPHATE 4 MG/ML
INJECTION, SOLUTION INTRA-ARTICULAR; INTRALESIONAL; INTRAMUSCULAR; INTRAVENOUS; SOFT TISSUE PRN
Status: DISCONTINUED | OUTPATIENT
Start: 2021-02-25 | End: 2021-02-25

## 2021-02-25 RX ORDER — LIDOCAINE HYDROCHLORIDE 20 MG/ML
INJECTION, SOLUTION EPIDURAL; INFILTRATION; INTRACAUDAL; PERINEURAL PRN
Status: DISCONTINUED | OUTPATIENT
Start: 2021-02-25 | End: 2021-02-25 | Stop reason: SDUPTHER

## 2021-02-25 RX ORDER — FENTANYL CITRATE 50 UG/ML
INJECTION, SOLUTION INTRAMUSCULAR; INTRAVENOUS PRN
Status: DISCONTINUED | OUTPATIENT
Start: 2021-02-25 | End: 2021-02-25 | Stop reason: SDUPTHER

## 2021-02-25 RX ORDER — ONDANSETRON 2 MG/ML
4 INJECTION INTRAMUSCULAR; INTRAVENOUS
Status: DISCONTINUED | OUTPATIENT
Start: 2021-02-25 | End: 2021-02-25 | Stop reason: HOSPADM

## 2021-02-25 RX ORDER — SUCCINYLCHOLINE/SOD CL,ISO/PF 200MG/10ML
SYRINGE (ML) INTRAVENOUS PRN
Status: DISCONTINUED | OUTPATIENT
Start: 2021-02-25 | End: 2021-02-25 | Stop reason: SDUPTHER

## 2021-02-25 RX ADMIN — FAMOTIDINE 20 MG: 20 TABLET ORAL at 20:23

## 2021-02-25 RX ADMIN — Medication 100 MCG: at 13:09

## 2021-02-25 RX ADMIN — FAMOTIDINE 20 MG: 20 TABLET ORAL at 08:03

## 2021-02-25 RX ADMIN — DEXAMETHASONE SODIUM PHOSPHATE 8 MG: 4 INJECTION, SOLUTION INTRAMUSCULAR; INTRAVENOUS at 12:59

## 2021-02-25 RX ADMIN — GABAPENTIN 800 MG: 400 CAPSULE ORAL at 15:05

## 2021-02-25 RX ADMIN — AMIODARONE HYDROCHLORIDE 200 MG: 200 TABLET ORAL at 18:24

## 2021-02-25 RX ADMIN — TIOTROPIUM BROMIDE INHALATION SPRAY 2 PUFF: 3.12 SPRAY, METERED RESPIRATORY (INHALATION) at 08:12

## 2021-02-25 RX ADMIN — Medication 10 MG: at 13:12

## 2021-02-25 RX ADMIN — BUDESONIDE AND FORMOTEROL FUMARATE DIHYDRATE 2 PUFF: 80; 4.5 AEROSOL RESPIRATORY (INHALATION) at 19:30

## 2021-02-25 RX ADMIN — GABAPENTIN 800 MG: 400 CAPSULE ORAL at 08:03

## 2021-02-25 RX ADMIN — LEVOTHYROXINE SODIUM 50 MCG: 0.05 TABLET ORAL at 08:03

## 2021-02-25 RX ADMIN — GABAPENTIN 300 MG: 300 CAPSULE ORAL at 20:23

## 2021-02-25 RX ADMIN — IPRATROPIUM BROMIDE AND ALBUTEROL SULFATE 1 AMPULE: .5; 3 SOLUTION RESPIRATORY (INHALATION) at 08:12

## 2021-02-25 RX ADMIN — Medication 100 MCG: at 13:12

## 2021-02-25 RX ADMIN — IPRATROPIUM BROMIDE AND ALBUTEROL SULFATE 1 AMPULE: .5; 3 SOLUTION RESPIRATORY (INHALATION) at 19:30

## 2021-02-25 RX ADMIN — PROPOFOL 150 MG: 10 INJECTION, EMULSION INTRAVENOUS at 12:52

## 2021-02-25 RX ADMIN — CARVEDILOL 3.12 MG: 3.12 TABLET, FILM COATED ORAL at 08:03

## 2021-02-25 RX ADMIN — Medication 180 MG: at 12:52

## 2021-02-25 RX ADMIN — Medication 10 ML: at 08:03

## 2021-02-25 RX ADMIN — SODIUM CHLORIDE: 9 INJECTION, SOLUTION INTRAVENOUS at 20:23

## 2021-02-25 RX ADMIN — SODIUM CHLORIDE: 9 INJECTION, SOLUTION INTRAVENOUS at 12:42

## 2021-02-25 RX ADMIN — ONDANSETRON 4 MG: 2 INJECTION INTRAMUSCULAR; INTRAVENOUS at 13:16

## 2021-02-25 RX ADMIN — LIDOCAINE HYDROCHLORIDE 60 MG: 20 INJECTION, SOLUTION EPIDURAL; INFILTRATION; INTRACAUDAL; PERINEURAL at 12:52

## 2021-02-25 RX ADMIN — FENTANYL CITRATE 50 MCG: 50 INJECTION INTRAMUSCULAR; INTRAVENOUS at 12:52

## 2021-02-25 RX ADMIN — MIDAZOLAM 2 MG: 1 INJECTION INTRAMUSCULAR; INTRAVENOUS at 12:44

## 2021-02-25 RX ADMIN — IPRATROPIUM BROMIDE AND ALBUTEROL SULFATE 1 AMPULE: .5; 3 SOLUTION RESPIRATORY (INHALATION) at 16:32

## 2021-02-25 RX ADMIN — BUDESONIDE AND FORMOTEROL FUMARATE DIHYDRATE 2 PUFF: 80; 4.5 AEROSOL RESPIRATORY (INHALATION) at 08:12

## 2021-02-25 ASSESSMENT — PULMONARY FUNCTION TESTS
PIF_VALUE: 22
PIF_VALUE: 25
PIF_VALUE: 25
PIF_VALUE: 23
PIF_VALUE: 8
PIF_VALUE: 13
PIF_VALUE: 25
PIF_VALUE: 26
PIF_VALUE: 2
PIF_VALUE: 25
PIF_VALUE: 7
PIF_VALUE: 28
PIF_VALUE: 14
PIF_VALUE: 22
PIF_VALUE: 24
PIF_VALUE: 1
PIF_VALUE: 25
PIF_VALUE: 24
PIF_VALUE: 26
PIF_VALUE: 24
PIF_VALUE: 2
PIF_VALUE: 25
PIF_VALUE: 29
PIF_VALUE: 7

## 2021-02-25 ASSESSMENT — PAIN SCALES - GENERAL
PAINLEVEL_OUTOF10: 0
PAINLEVEL_OUTOF10: 0

## 2021-02-25 ASSESSMENT — PAIN - FUNCTIONAL ASSESSMENT: PAIN_FUNCTIONAL_ASSESSMENT: 0-10

## 2021-02-25 ASSESSMENT — LIFESTYLE VARIABLES: SMOKING_STATUS: 1

## 2021-02-25 ASSESSMENT — PAIN DESCRIPTION - PAIN TYPE: TYPE: ACUTE PAIN

## 2021-02-25 NOTE — ANESTHESIA POSTPROCEDURE EVALUATION
Department of Anesthesiology  Postprocedure Note    Patient: Frank Mcdaniels  MRN: [id number]  YOB: 1955  Date of evaluation: 2/25/2021  Time:  2:16 PM     Procedure Summary     Date: 02/25/21 Room / Location: [address]    Anesthesia Start: 1243 Anesthesia Stop: 1339    Procedures:       ORAL PHARYNGEAL BIOPSY (N/A Mouth)      NASAL ENDOSCOPY (N/A Mouth)      DIRECT LARYNGOSCOPY (N/A Mouth) Diagnosis:       Lesion of palate      (PALATE LESION)    Surgeons: Barry Ward MD Responsible Provider: Ami Flores MD    Anesthesia Type: general ASA Status: 3          Anesthesia Type: general    Flip Phase I: Flip Score: 9    Flip Phase II:      Last vitals: Reviewed and per EMR flowsheets.        Anesthesia Post Evaluation    Patient location during evaluation: PACU  Patient participation: complete - patient participated  Level of consciousness: awake and alert  Airway patency: patent  Nausea & Vomiting: no nausea and no vomiting  Complications: no  Cardiovascular status: hemodynamically stable  Respiratory status: acceptable  Hydration status: stable

## 2021-02-25 NOTE — PROGRESS NOTES
Occupational Therapy   Occupational Therapy Initial Assessment  Date: 2021   Patient Name: Roxane Barrera  MRN: [id number]     : 1955    Date of Service: 2021    Discharge Recommendations:  Continue to assess pending progress, Home with Home health OT, S Level 1  OT Equipment Recommendations  Other: continue to assess DME needs     Roxane Barrera scored a 21/24 on the AM-PAC ADL Inpatient form. Current research shows that an AM-PAC score of 18 or greater is typically associated with a discharge to the patient's home setting. Based on the patient's AM-PAC score, and their current ADL deficits, it is recommended that the patient have 2-3 sessions per week of Occupational Therapy at d/c to increase the patient's independence. At this time, this patient demonstrates the endurance and safety to discharge home with home health services and a follow up treatment frequency of 2-3x/wk. Please see assessment section for further patient specific details. If patient discharges prior to next session this note will serve as a discharge summary. Please see below for the latest assessment towards goals. Assessment   Performance deficits / Impairments: Decreased safe awareness;Decreased ADL status; Decreased strength  Assessment: Pt is a 71 y/o female who was admitted to the hospital d/t suzi. PTA, pt lived alone in a 3rd floor apartment, reports that she was IND w/ all ADLs and performed IADLs with some difficulty. Pt reports that several family members have a history of drug abuse and are unable to proivde adequate assistance. Currently pt requires SBA for t/f and fxl mob, and is IND for bed mob and feeding. OT asked pt about DME needs; pt declines need for walker or shower chair. OT recommends pt continue to receive [facility] OT services at [place]; pt plans to return home when medically stable, but has limited support in current home setup -- discussed moving to [facility] w/ son.  Pt would department secondary to concern for lightheadedness. She reports brief similar episodes for a few days but this morning it was constant and associated with substernal chest pain. Denies abdominal pain. CP improved after nitro here. She received one liter IVF. On physical exam did note some wheezing, s/p breathing treatments. Past medical history significant for asthma, CAD, heart failure (though echo from 10.2019 with normal EF),  COPD, depression, DM, HLD, HTN, hypothyroidism, substance abuse, thyroid disease. \"  Referring Practitioner: Lynette Cleaning  Diagnosis: lightheadness  Subjective  Subjective: Met pt in room, sitting in bed w/ meal  General Comment  Comments: per RN okay for eval  Vital Signs  Temp: 98 °F (36.7 °C)  Temp Source: Oral  Pulse: 81  Heart Rate Source: Monitor  Resp: 16  BP: 138/86  BP Location: Left Arm  MAP (mmHg): 103  Level of Consciousness: Alert (0)  MEWS Score: 1  Oxygen Therapy  SpO2: 91 %  O2 Device: Nasal cannula  Social/Functional History  Social/Functional History  Lives With: Alone  Type of Home: Apartment  Home Layout: One level, Laundry in basement  Home Access: Stairs to enter with rails  Entrance Stairs - Number of Steps: 3 flights (3rd-floor apartment)  Bathroom Shower/Tub: Tub/Shower unit  Bathroom Toilet: Standard  Home Equipment: Oxygen(2 L. O2 at night)  ADL Assistance: Independent  Homemaking Assistance: Independent(Pt receives MOW; cooks connie meals; pt is able to clean her apartment with some difficulty)  Ambulation Assistance: Independent  Transfer Assistance: Independent  Active : No  Additional Comments: Denies hx of falls; pt reports family members are regular drug users       Objective        Orientation  Overall Orientation Status: Within Normal Limits     Balance  Sitting Balance: Stand by assistance  Standing Balance: Stand by assistance  Standing Balance  Time: less than 1 min  Activity: ambulating in room  Functional Mobility  Functional - Mobility Device: No device  Assist Level: Stand by assistance  Functional Mobility Comments: no LOB  ADL  Feeding: Independent  Additional Comments: unable to assess full ADLs at this time  Tone RUE  RUE Tone: Normotonic  Tone LUE  LUE Tone: Normotonic  Coordination  Movements Are Fluid And Coordinated: Yes     Bed mobility  Rolling to Right: Independent  Supine to Sit: Independent  Sit to Supine: Independent  Scooting: Independent  Transfers  Sit to stand: Stand by assistance  Stand to sit: Stand by assistance     Cognition  Overall Cognitive Status: WFL  Perception  Overall Perceptual Status: WFL              LUE AROM (degrees)  LUE AROM : WFL  Left Hand AROM (degrees)  Left Hand AROM: WFL  RUE AROM (degrees)  RUE AROM : WFL  Right Hand AROM (degrees)  Right Hand AROM: WFL  LUE Strength  Gross LUE Strength: WFL  L Hand General: 5/5  RUE Strength  Gross RUE Strength: WFL  R Hand General: 5/5                   Plan   Plan  Times per week: 2-3  Times per day: Daily  Specific instructions for Next Treatment: shower level bathing/DC planning  Current Treatment Recommendations: Safety Education & Training, Self-Care / ADL, Functional Mobility Training, Home Management Training      AM-[facility] Score        [facility] Inpatient Daily Activity Raw Score: 21 (02/25/21 1602)  AM-PAC Inpatient ADL T-Scale Score : 44.27 (02/25/21 1602)  ADL Inpatient CMS 0-100% Score: 32.79 (02/25/21 1602)  ADL Inpatient CMS G-Code Modifier : Alverna Reas (02/25/21 1602)    Goals  Short term goals  Time Frame for Short term goals: by 2-3 sessions, pt will complete  Short term goal 1: UB/ LB dressing IND  Short term goal 2: LB/UB bathing w/ setup  Short term goal 3: household t/f IND, no AD  Short term goal 4: toileting IND       Therapy Time   Individual Concurrent Group Co-treatment   Time In 1505         Time Out 1555         Minutes 50         Timed Code Treatment Minutes: 2710 Alejandro Welch, OT/S EDUAR Deras/BELLA #[id number] provided direct supervision to student

## 2021-02-25 NOTE — ANESTHESIA POSTPROCEDURE EVALUATION
Department of Anesthesiology  Postprocedure Note    Patient: Taj Selby  MRN: [id number]  YOB: 1955  Date of evaluation: 2/25/2021  Time:  2:18 PM     Procedure Summary     Date: 02/25/21 Room / Location: [address]    Anesthesia Start: 1243 Anesthesia Stop: 1339    Procedures:       ORAL PHARYNGEAL BIOPSY (N/A Mouth)      NASAL ENDOSCOPY (N/A Mouth)      DIRECT LARYNGOSCOPY (N/A Mouth) Diagnosis:       Lesion of palate      (PALATE LESION)    Surgeons: Clarice Ramírez MD Responsible Provider: Christin Love MD    Anesthesia Type: general ASA Status: 3          Anesthesia Type: general    Flip Phase I: Flip Score: 9    Flip Phase II:      Last vitals: Reviewed and per EMR flowsheets.        Anesthesia Post Evaluation    Patient location during evaluation: PACU  Patient participation: complete - patient participated  Level of consciousness: awake and alert  Pain score: 0  Airway patency: patent  Nausea & Vomiting: no nausea and no vomiting  Complications: no  Cardiovascular status: blood pressure returned to baseline  Respiratory status: acceptable  Hydration status: euvolemic

## 2021-02-25 NOTE — PROGRESS NOTES
Pt back to room 4266. Pt A&O, VSS, no complaints of pain. Pt is laying comfortably in bed. Bed wheels locked, bed in lowest position, bed alarm on, call light within reach.

## 2021-02-25 NOTE — PROGRESS NOTES
No acute events overnight. Plan is for a nasal endoscopy, direct laryngoscopy and biopsy of her palate lesion. She understands the risk and benefits and agrees to proceed.

## 2021-02-25 NOTE — OP NOTE
[address] [facility] SURGERY  OPERATIVE REPORT    Patient Name: Hernandez Glover  YOB: 1955  Medical Record Number:  [id number]  Billing Number:  [id number]  Date of Procedure: [unfilled]  Time: 4326    Pre Operative Diagnoses:   Lesion of soft palate     Post Operative Diagnoses:    Same    Procedure:  Nasal endoscopy (92938)  Direct laryngoscopy (49778)  Oropharyngeal biopsy (79820)       Surgeon: Caridad Machado MD  OR Staff/ Assistant:  Circulator: Rosaura Moran RN  Surgical Assistant: Shanika Barton  Relief Scrub: Pepper Houuty  Scrub Person First: Pepito Rodriguez  Circulator Assist: Rani Trujillo RN; Robb Gavin    Anesthesia:  General anesthesia. Findings:  1) 2 cm circular ulcerative lesion of the posterior soft palate and superior aspect of the anterior tonsillar pillar. The nasal endoscopy did not reveal any intranasal lesions. DL did not show any additional masses    Indications: This is a 72 y.o. female who was admitted for syncope and had a incidental palatal lesion noted. The appearance and her history of smoking suggested this was likely a cancer. I want to take her to the operating room for a biopsy as well as nasal endoscopy and laryngoscopy to make sure there was no additional lesion or extent of the lesion into the nose. Risks and benefits discussed with the patient including alternate treatment options, Informed consent was obtained, the patient elected to proceed with the planned procedure. DETAILS OF PROCEDURE(S):   Patient was brought to the operating room placed in supine position operating table. She underwent uncomplicated general anesthesia with endotracheal intubation. Head of bed was turned 180 degrees. She was prepped and draped. First nasal endoscopy was carried out. A rigid scope was passed to the bilateral nasal cavity. No intranasal lesions was noted.   The posterior aspect of the soft palate appeared relatively normal and I did not appreciate obvious extension of the palate lesion to the nasal surface. Next a wet Ray-Brayan was placed over the maxillary gingiva. Dedo laryngoscope was used to do a laryngoscopy. I could see the ulcerative lesion of the posterior soft palate. Otherwise I did not see any oropharyngeal masses. There was no lesions of either piriform or post cricoid area. The supraglottic and glottic structures appeared to be normal.    A tonsillar gag was placed and open. This revealed the approximately 2 cm ulcerative lesion on the posterior aspect of the soft palate on the left side. Small piece of this was removed with scissors and sent to pathology. Hemostasis was aggressively obtained with suction Bovie given that the patient is on Plavix. At this time the head of bed was rotated back 180 degrees and she was let awaken, extubated and taken to recovery. I attest that I was present for and did the entire procedure myself. Estimated Blood Loss: Minimal                 Specimens:   ID Type Source Tests Collected by Time Destination   A : left posterior soft palate  plaese test for HPV and P16 Specimen Mouth SURGICAL PATHOLOGY Jo Ann Ricardo MD 2/25/2021 0615                 Complications: There were no complications.     Jo Ann Ricardo

## 2021-02-25 NOTE — PROGRESS NOTES
Patient arrived to PACU awake and alert. Patient has no complaint of pain. Pt has no drainage in nasal cavity.

## 2021-02-25 NOTE — PROGRESS NOTES
Hospitalist Progress Note      PCP: TERESA Parker - NP    Date of Admission: 2/22/2021    Chief Complaint: lightheaded. Subjective:     Lightheadedness resolved. Feels better. Biopsy of oral lesion planned today. Several self limited runs of asymptomatic Afib last night - this is new. Medications:  Reviewed    Infusion Medications    sodium chloride       Scheduled Medications    sodium chloride flush  10 mL Intravenous 2 times per day    furosemide  40 mg Oral Daily    carvedilol  3.125 mg Oral BID WC    citalopram  20 mg Oral Daily    famotidine  20 mg Oral BID    gabapentin  800 mg Oral TID    levothyroxine  50 mcg Oral Daily    budesonide-formoterol  2 puff Inhalation BID    tiotropium  2 puff Inhalation Daily    nicotine  1 patch Transdermal Daily    enoxaparin  30 mg Subcutaneous BID    clopidogrel  75 mg Oral Daily    ipratropium-albuterol  1 ampule Inhalation Q4H WA     PRN Meds: sodium chloride flush, fentanNYL, HYDROmorphone, fentanNYL, HYDROmorphone, oxyCODONE-acetaminophen **OR** oxyCODONE-acetaminophen, ondansetron, sodium chloride flush, potassium chloride **OR** potassium alternative oral replacement **OR** potassium chloride, magnesium sulfate, promethazine **OR** ondansetron, acetaminophen **OR** acetaminophen, melatonin, ipratropium-albuterol      Intake/Output Summary (Last 24 hours) at 2/25/2021 1151  Last data filed at 2/25/2021 1017  Gross per 24 hour   Intake 960 ml   Output 550 ml   Net 410 ml       Physical Exam Performed:    /72   Pulse 74   Temp 97.6 °F (36.4 °C) (Temporal)   Resp 18   Ht 4' 11\" (1.499 m)   Wt 224 lb 6.9 oz (101.8 kg)   SpO2 96%   BMI 45.33 kg/m²     General appearance: No apparent distress, appears stated age and cooperative. HEENT: Pupils equal, round, and reactive to light. Conjunctivae/corneas clear. Neck: Supple, with full range of motion. No jugular venous distention.  Trachea midline. Respiratory:  Normal respiratory effort. Clear to auscultation, bilaterally without Rales/Wheezes/Rhonchi. Cardiovascular: Regular rate and rhythm with normal S1/S2 without murmurs, rubs or gallops. Abdomen: Soft, non-tender, non-distended with normal bowel sounds. Musculoskeletal: No clubbing, cyanosis or edema bilaterally. Full range of motion without deformity. Skin: Skin color, texture, turgor normal.  No rashes or lesions. Neurologic:  Neurovascularly intact without any focal sensory/motor deficits. Cranial nerves: II-XII intact, grossly non-focal.  Psychiatric: Alert and oriented, thought content appropriate, normal insight  Capillary Refill: Brisk,< 3 seconds   Peripheral Pulses: +2 palpable, equal bilaterally       Labs:   Recent Labs     02/23/21  0628 02/24/21  0702 02/25/21  0629   WBC 15.1* 9.0 8.2   HGB 14.0 13.9 13.9   HCT 42.9 42.2 42.4    282 272     Recent Labs     02/23/21  0628 02/24/21  0702 02/25/21  0629   * 142 138   K 3.9 4.3 4.1    106 101   CO2 20* 25 28   BUN 15 16 20   CREATININE 0.6 0.8 0.8   CALCIUM 9.1 9.1 9.1     Recent Labs     02/22/21  1435   AST 11*   ALT 9*   BILITOT 0.4   ALKPHOS 82     No results for input(s): INR in the last 72 hours. Recent Labs     02/22/21  1435   TROPONINI <0.01       Urinalysis:      Lab Results   Component Value Date    NITRU Negative 02/25/2021    WBCUA 0-2 03/04/2017    RBCUA 0-2 03/04/2017    BLOODU Negative 02/25/2021    SPECGRAV 1.018 02/25/2021    GLUCOSEU Negative 02/25/2021       Radiology:  VL DUP CAROTID BILATERAL   Final Result      CT Head WO Contrast   Final Result   No acute intracranial process. Mild paranasal sinus disease. CTA HEAD NECK W CONTRAST   Final Result   Possible moderate or severe stenosis of the proximal left internal carotid   artery, which is obscured by motion artifact. No large vessel occlusion in the head or neck.          XR CHEST PORTABLE   Final Result   No active cardiopulmonary disease                 Assessment/Plan:    Active Hospital Problems    Diagnosis    Light headed [R42]    Morbid obesity with BMI of 40.0-44.9, adult ([facility] [E66.01, Z68.41]    Tobacco abuse [Z72.0]    Panlobular emphysema ([facility] [J43.1]    Type 2 diabetes mellitus ([facility] [E11.9]    Essential hypertension [I10]    Acquired hypothyroidism [E03.9]       Light-headedness  -  Patient is orthostatic. She could not tolerate even sitting up to check orthostatics. - this has resolved with holding her BP meds. - resumed lasix PO daily.         Left SADE, h/o CAD s/p PCI 2016. Hx if inferior wall MI. Hx of cardiac arrest.   -  Moderate or severe left internal SADE per CT-A head/neck. Study was of limited quality d/t motion artifacts. This finding is significant in the long term but does not explain patient's light-headedness.  -  Duplex carotid U/S shows <50% stenosis with moderate plaque formation bilateral.   -  Already taking plavix related to prior h/o CAD s/p PCI in 2016. She is supposed to also be taking statin but wasn't. Resumed statin. Afib RVR - self limited runs noted on telemetry last night. Get ECHO. Ask cardiology to eval considering her extensive cardiac Hx. Will need AC, once oral lesion biopsy has been completed.        Oral lesion concerning for malignancy. - ENT involved. - biopsy today.        COPD stable, Tobaco abuse  -  Continue home inhaler regimen. Smoking cessation counseled. NRT provided.       DM2 borderline - A1C 5.9  -  monitor BG.        DVT Prophylaxis: lovenox  Diet: Diet NPO, After Midnight  Code Status: Full Code      Dispo - cc    Aldo Nair MD

## 2021-02-25 NOTE — PROGRESS NOTES
to this visit. has a past medical history of Asthma, CAD (coronary artery disease), CHF (congestive heart failure) ([facility] COPD (chronic obstructive pulmonary disease) ([facility] Depression, Diabetes mellitus ([facility] Hyperlipidemia, Hypertension, Hypothyroidism, Morbid obesity with BMI of 40.0-44.9, adult ([place] [address] Sleep apnea, Substance abuse ([facility] Thyroid disease, and Type 2 diabetes mellitus without complication ([facility] has a past surgical history that includes eye surgery; hernia repair;  section; Cardiac surgery; sinus surgery; Tonsillectomy; Adenoidectomy; Hysterectomy; Coronary angioplasty with stent; Mouth surgery (N/A, 2021); Nasal sinus surgery (N/A, 2021); and laryngoscopy (N/A, 2021). Restrictions  Restrictions/Precautions  Restrictions/Precautions: Fall Risk  Position Activity Restriction  Other position/activity restrictions: Orthostatic hypotension (no dizziness reported during PT eval)     Vision/Hearing  Vision: Impaired  Vision Exceptions: (Blurry vision L eye)  Hearing: Within functional limits       Subjective  General  Chart Reviewed: Yes  Additional Pertinent Hx: Per SANA Esparza, \"Lindsey Marcos is a 72 y.o. female who presents to the emergency department with complaints of chest pain and lightheadedness. Patient says she has been having symptoms like this briefly, on and off for the past couple days, but this morning it is worse and it is constant. She says the pain is in the center of her chest, pressure-like, not going away, nonradiating. She also reports that she feels like she is going to pass out, especially if she tries to get up and move around, and she gets really unstable with this. Says this is not typical for her. Denies nausea, vomiting, indigestion. Denies abdominal pain. Denies any recent falls or trauma. She denies headache, focal weakness, confusion. Denies cold symptoms or fever.   Says she has COPD and uses oxygen occasionally at home, no shortness of breath presently, no new or unusual cough. \"  Response To Previous Treatment: Not applicable  Referring Practitioner: Dr. Angel Brower  Referral Date : 02/25/21  Diagnosis: Oral lesion; Light headedness; Carotid Artery Stenosis  Follows Commands: Within Functional Limits  Subjective  Subjective: Pt pleasant and agreeable to evaluation s/p oral biopsy. Denies dizziness.     Orientation  Orientation  Overall Orientation Status: Within Normal Limits     Social/Functional History  Social/Functional History  Lives With: Alone  Type of Home: Apartment  Home Layout: One level  Home Access: Stairs to enter with rails  Entrance Stairs - Number of Steps: 3 flights (3rd-floor apartment)  Bathroom Shower/Tub: Tub/Shower unit  Bathroom Toilet: Standard  Home Equipment: Oxygen(2 L. O2 at night)  ADL Assistance: Independent  Homemaking Assistance: Independent(Pt receives MOW; cooks connie meals; pt is able to clean her apartment with some difficulty)  Ambulation Assistance: Independent  Transfer Assistance: Independent  Active : No  Additional Comments: Denies hx of falls    Objective    AROM RLE (degrees)  RLE AROM: WFL  RLE General AROM: Hip Flex, knee Flex/Ext, and Ankle PF/DF WFL  AROM LLE (degrees)  LLE AROM : WFL  LLE General AROM: Hip Flex, knee Flex/Ext, and Ankle PF/DF WFL  AROM RUE (degrees)  RUE AROM : WFL  RUE General AROM: Shoulder Flex, Elbow Flex/Ext WFL  AROM LUE (degrees)  LUE AROM : WFL  LUE General AROM: Shoulder Flex, Elbow Flex/Ext WFL     Strength RLE  Strength RLE: WFL  Comment: Hip Flex, knee Flex/Ext, and Ankle PF/DF WFL  Strength LLE  Strength LLE: WFL  Comment: Hip Flex, knee Flex/Ext, and Ankle PF/DF [facility]  Strength RUE  Strength RUE: WFL  Comment: Shoulder Flex, Elbow Flex/Ext WFL  Strength LUE  Strength LUE: WFL  Comment: Shoulder Flex, Elbow Flex/Ext WFL     Tone RLE  RLE Tone: Normotonic  Tone LLE  LLE Tone: Normotonic  Motor Control  Gross Motor?: WFL     Bed mobility  Supine to Sit: Independent  Sit to Supine: Independent     Transfers  Sit to Stand: Supervision  Stand to sit: Supervision     Ambulation  Ambulation?: Yes  Ambulation 1  Surface: level tile  Device: No Device  Other Apparatus: O2(3 L.)  Assistance: Supervision  Quality of Gait: Pt ambulated quickly without device, denying pain or fatigue, no dizziness noted, easily maintaining balance throughout. Distance: 76', 25' x 2     Balance  Comments: Pt able to complete toilet transfer with supervision. No difficulty maintaining static standing balance x 1 min. Plan   Plan  Times per week: 2-3x  Specific instructions for Next Treatment: Improve strength, balance, endurance  Current Treatment Recommendations: Strengthening, Balance Training, Endurance Training, Functional Mobility Training, Transfer Training, Gait Training, Stair training, Neuromuscular Re-education, Home Exercise Program, Safety Education & Training, Patient/Caregiver Education & Training, Equipment Evaluation, Education, & procurement  Safety Devices  Type of devices: All fall risk precautions in place, Call light within reach, Bed alarm in place, Gait belt, Left in bed, Nurse notified  Restraints  Initially in place: No    AM-PAC Score  AM-PAC Inpatient Mobility Raw Score : 21 (02/25/21 1506)  AM-PAC Inpatient T-Scale Score : 50.25 (02/25/21 1506)  Mobility Inpatient CMS 0-100% Score: 28.97 (02/25/21 1506)  Mobility Inpatient CMS G-Code Modifier : CJ (02/25/21 1506)          Goals  Short term goals  Time Frame for Short term goals: In 2-3 days pt will perform  Short term goal 1: Transfers (I)  Short term goal 2: Ambulation 150' (I)  Short term goal 3: Ascend/Descend 12 steps with SBA  Long term goals  Time Frame for Long term goals : LTG = STG  Patient Goals   Patient goals :  To return home       Therapy Time   Individual Concurrent Group Co-treatment   Time In 1425         Time Out 1510         Minutes 45         Timed Code Treatment Minutes: 30 Minutes Evaluation time: 15 min.        Jenn Ramirez PT    Electronically signed by Jenn Ramirez, PT [id number] on 2/25/2021 at 3:11 PM

## 2021-02-25 NOTE — PROGRESS NOTES
Pt is A+Ox4, surgery today - consent signed and in chart. Help morning lasix (will give post-op if appropriate) and anticoagulants. MD updated. IV was flushed and working properly for surgery. All needs meet at this time.      Electronically signed by Neena Baumgarten, RN on 2/25/2021 at 10:19 AM

## 2021-02-25 NOTE — CONSULTS
[address]  Cardiology Consult Note        CC:     ? AFib           HPI:   This is a 72 y.o. female who came to the ER for severe lightheadedness and presyncope. We are asked to see her for new onset A. fib flutter last night at a rate of 150. Admission EKG shows sinus rhythm.   She is in sinus rhythm now appears to have self converted back to normal rhythm    She has a past medical history of CAD stenting hypertension diabetes obstructive sleep apnea and COPD      Presently there is mention about chest pain on her admission but she did not state that to me        Past Medical History:   Diagnosis Date    Asthma     CAD (coronary artery disease)     CHF (congestive heart failure) ([facility]     COPD (chronic obstructive pulmonary disease) ([facility]     Depression     Diabetes mellitus ([facility]     Hyperlipidemia     Hypertension     Hypothyroidism     Morbid obesity with BMI of 40.0-44.9, adult ([facility] 2019    Sleep apnea     Substance abuse ([facility]     Thyroid disease     Type 2 diabetes mellitus without complication ([facility]       Past Surgical History:   Procedure Laterality Date    ADENOIDECTOMY      CARDIAC SURGERY       SECTION      x3    CORONARY ANGIOPLASTY WITH STENT PLACEMENT      EYE SURGERY      HERNIA REPAIR      HYSTERECTOMY      LARYNGOSCOPY N/A 2021    DIRECT LARYNGOSCOPY performed by Kathy Prasad MD at [place] [id number] N/A 2021    ORAL PHARYNGEAL BIOPSY performed by Kathy Prasad MD at [facility] N/A 2021    NASAL ENDOSCOPY performed by Kathy Prasad MD at [facility].        Family History   Problem Relation Age of Onset    Heart Disease Mother     High Blood Pressure Mother     Diabetes Mother     Heart Disease Father     High Blood Pressure Father     Diabetes Father       Social History     Tobacco Use    Smoking status: Former Smoker     Packs/day: 1.25     Years: 45.00     Pack years: 56.25     Types: Cigarettes     Quit date: 2019     Years since quittin.4    Smokeless tobacco: Never Used   Substance Use Topics    Alcohol use: No    Drug use: No     Allergies   Allergen Reactions    Oxycodone-Acetaminophen Nausea And Vomiting     Rash.  Tramadol Other (See Comments)     States causes severe nausea and vomiting.     Codeine     Hydrocodone-Acetaminophen Nausea And Vomiting      furosemide  40 mg Oral Daily    carvedilol  3.125 mg Oral BID WC    citalopram  20 mg Oral Daily    famotidine  20 mg Oral BID    gabapentin  800 mg Oral TID    levothyroxine  50 mcg Oral Daily    budesonide-formoterol  2 puff Inhalation BID    tiotropium  2 puff Inhalation Daily    nicotine  1 patch Transdermal Daily    enoxaparin  30 mg Subcutaneous BID    clopidogrel  75 mg Oral Daily    ipratropium-albuterol  1 ampule Inhalation Q4H WA       Review of Systems -   Constitutional: Negative for weight gain/loss; malaise, fever  Respiratory: Negative for Asthma;  cough and hemoptysis  Cardiovascular: Negative for palpitations,dizziness   Gastrointestinal: Negative for abd.pain; constipation/diarrhea;    Genitourinary: Negative for stones; hematuria; frequency hesitancy  Integumentt: Negative for rash or pruritis  Hematologic/lymphatic: Negative for blood dyscrasia; leukemia/lymphoma  Musculoskeletal: Negative for Connective tissue disease  Neurological:  Negative for Seizure   Behavioral/Psych:Negative for Bipolar disorder, Schizophrenia; Dementia  Endocrine: negative for thyroid, parathyroid disease      Intake/Output Summary (Last 24 hours) at 2021 1515  Last data filed at 2021 1330  Gross per 24 hour   Intake 490 ml   Output 570 ml   Net -80 ml       Physical Examination:    /86   Pulse 81   Temp 98 °F (36.7 °C) (Oral)   Resp 16   Ht 4' 11\" (1.499 m)   Wt 224 lb 6.9 oz (101.8 kg)   SpO2 91%   BMI 45.33 kg/m²    HEENT:  Face: Atraumatic, Conjunctiva: Pink; non icteric,  Mucous Memb:  Moist, No thyromegaly or Lymphadenopathy  Respiratory:  Resp Assessment: normal, Resp Auscultation: clear   Cardiovascular: Auscultation: nl S1 & S2, Palpation:  Nl PMI;  No heaves or thrills, JVP:  normal  Abdomen: Soft, non-tender, Normal bowel sounds,  No organomegaly  Extremities: No Cyanosis or Clubbing; Edema none  Neurological: Oriented to time, place, and person, Non-anxious  Psychiatric: Normal mood and affect  Skin: Warm and dry,  No rash seen      Current Facility-Administered Medications: 0.9 % sodium chloride infusion, , Intravenous, Continuous  furosemide (LASIX) tablet 40 mg, 40 mg, Oral, Daily  carvedilol (COREG) tablet 3.125 mg, 3.125 mg, Oral, BID WC  citalopram (CELEXA) tablet 20 mg, 20 mg, Oral, Daily  famotidine (PEPCID) tablet 20 mg, 20 mg, Oral, BID  gabapentin (NEURONTIN) capsule 800 mg, 800 mg, Oral, TID  levothyroxine (SYNTHROID) tablet 50 mcg, 50 mcg, Oral, Daily  budesonide-formoterol (SYMBICORT) 80-4.5 MCG/ACT inhaler 2 puff, 2 puff, Inhalation, BID  tiotropium (SPIRIVA RESPIMAT) 2.5 MCG/ACT inhaler 2 puff, 2 puff, Inhalation, Daily  sodium chloride flush 0.9 % injection 10 mL, 10 mL, Intravenous, PRN  potassium chloride (KLOR-CON M) extended release tablet 40 mEq, 40 mEq, Oral, PRN **OR** potassium bicarb-citric acid (EFFER-K) effervescent tablet 40 mEq, 40 mEq, Oral, PRN **OR** potassium chloride 10 mEq/100 mL IVPB (Peripheral Line), 10 mEq, Intravenous, PRN  magnesium sulfate 1000 mg in dextrose 5% 100 mL IVPB, 1,000 mg, Intravenous, PRN  promethazine (PHENERGAN) tablet 12.5 mg, 12.5 mg, Oral, Q6H PRN **OR** ondansetron (ZOFRAN) injection 4 mg, 4 mg, Intravenous, Q6H PRN  acetaminophen (TYLENOL) tablet 650 mg, 650 mg, Oral, Q6H PRN **OR** acetaminophen (TYLENOL) suppository 650 mg, 650 mg, Rectal, Q6H PRN  nicotine (NICODERM CQ) 14 MG/24HR 1 patch, 1 patch, Transdermal, Daily  enoxaparin (LOVENOX) injection 30 mg, 30 mg, Subcutaneous, BID  melatonin tablet 3 mg, 3 mg, Oral, Nightly PRN  ipratropium-albuterol (DUONEB) nebulizer solution 1 ampule, 1 ampule, Inhalation, 4x Daily PRN  clopidogrel (PLAVIX) tablet 75 mg, 75 mg, Oral, Daily  ipratropium-albuterol (DUONEB) nebulizer solution 1 ampule, 1 ampule, Inhalation, Q4H WA      Labs:   Recent Labs     02/24/21  0702 02/25/21  0629   WBC 9.0 8.2   HGB 13.9 13.9   HCT 42.2 42.4    272     Recent Labs     02/24/21  0702 02/25/21  0629    138   K 4.3 4.1   CO2 25 28   BUN 16 20   CREATININE 0.8 0.8   GLUCOSE 81 88     No results for input(s): BNP in the last 72 hours. No results for input(s): PROTIME, INR in the last 72 hours. No results for input(s): APTT in the last 72 hours. No results for input(s): CKTOTAL, CKMB, CKMBINDEX, TROPONINI in the last 72 hours. Lab Results   Component Value Date    HDL 61 10/01/2019    LDLCALC 170 10/01/2019    TRIG 138 10/01/2019     No results for input(s): AST, ALT, LABALBU in the last 72 hours. EKG:   Sinus rhythm  Telemetry shows regular tachycardia rate of 150    ECHO:9/2019  Concentric LVH with normal LV size and wall motion. EF is  60%. The left atrium is normal in size. Normal right ventricular size and function. Trivial mitral regurgitation is present. Stress Nuclear:2019  Normal myocardial perfusion study. Normal myocardial perfusion. Normal LV size and systolic function. Overall findings represent a low risk study. [facility] angiogram  & Intervention: 6/2016 [facility]):  80% LAD, 50% ramus and occluded RCA  She underwent ANNALEE to RCA and in separate procedure stent to LAD. ASSESSMENT AND PLAN:      A flutter  Review of the rhythm strip shows that the patient likely has a flutter with intermittent A.  Fib  We will treat her with amiodarone 200 twice daily  Also increasing her Coreg to 25 twice daily  Chads vasc score (age, CAD, HTN, DM, = 4)  Will probably need long-term anticoagulation    Hypertension  Increase Coreg to 25 twice daily    History of CAD  Status post stent to the RCA and LAD in 2016      Patient going for oropharyngeal biopsy tomorrow        Shabana Dose M.D  2/25/2021

## 2021-02-25 NOTE — ANESTHESIA PRE PROCEDURE
[facility] Department of Anesthesiology  Pre-Anesthesia Evaluation/Consultation       Name:  Felicita Mars  : 1955  Age:  72 y.o. MRN:  [id number]  Date: 2021           Surgeon: Surgeon(s):  Radha Miller MD    Procedure: Procedure(s):  ORAL PHARYNGEAL BIOPSY  NASAL ENDOSCOPY  DIRECT LARYNGOSCOPY     Allergies   Allergen Reactions    Oxycodone-Acetaminophen Nausea And Vomiting     Rash.  Tramadol Other (See Comments)     States causes severe nausea and vomiting.     Codeine     Hydrocodone-Acetaminophen Nausea And Vomiting     Patient Active Problem List   Diagnosis    Atypical chest pain    COPD exacerbation ([address]    Coronary artery disease involving native coronary artery of native heart without angina pectoris    Type 2 diabetes mellitus ([address]    Essential hypertension    Pure hypercholesterolemia    Acquired hypothyroidism    Ischemic heart disease due to coronary artery obstruction (HCC)    Panlobular emphysema (HCC)    Moderate episode of recurrent major depressive disorder ([address]    Obstructive sleep apnea syndrome    H/O total hysterectomy    Tobacco abuse    Morbid obesity with BMI of 40.0-44.9, adult (HCC)    Acute on chronic heart failure with normal ejection fraction (HCC)    Acute on chronic heart failure (HCC)    Suspected sleep apnea    Nicotine dependence    Mixed hyperlipidemia    Light headed     Past Medical History:   Diagnosis Date    Asthma     CAD (coronary artery disease)     CHF (congestive heart failure) (HCC)     COPD (chronic obstructive pulmonary disease) ([address]     Depression     Diabetes mellitus ([address]     Hyperlipidemia     Hypertension     Hypothyroidism     Morbid obesity with BMI of 40.0-44.9, adult ([address] 2019    Sleep apnea     Substance abuse ([address]     Thyroid disease     Type 2 diabetes mellitus without complication ([address]      Past Surgical History:   Procedure Laterality Date    ADENOIDECTOMY      CARDIAC SURGERY       SECTION      x3    CORONARY ANGIOPLASTY WITH STENT PLACEMENT      EYE SURGERY      HERNIA REPAIR      HYSTERECTOMY      SINUS SURGERY      TONSILLECTOMY       Social History     Tobacco Use    Smoking status: Former Smoker     Packs/day: 1.25     Years: 45.00     Pack years: 56.25     Types: Cigarettes     Quit date: 2019     Years since quittin.4    Smokeless tobacco: Never Used   Substance Use Topics    Alcohol use: No    Drug use: No     Medications  No current facility-administered medications on file prior to encounter. Current Outpatient Medications on File Prior to Encounter   Medication Sig Dispense Refill    carvedilol (COREG) 3.125 MG tablet TAKE 1 TABLET BY MOUTH TWICE A DAY WITH MEALS  30 tablet 5    amLODIPine (NORVASC) 5 MG tablet TAKE ONE (1) TABLET(S) EVERY DAY BY ORAL ROUTE FOR 30 DAYS. 30 tablet 5    furosemide (LASIX) 40 MG tablet TAKE 1 TABLET BY MOUTH TWICE A DAY 60 tablet 5    Umeclidinium Bromide (INCRUSE ELLIPTA) 62.5 MCG/INH AEPB Inhale 1 puff into the lungs daily 1 each 6    nitroGLYCERIN (NITROSTAT) 0.4 MG SL tablet PLACE 1 TABLET UNDER THE TONGUE EVERY 5 MINUTES AS NEEDED FOR CHEST PAIN UP TO MAX OF 3 TOTAL DOSES.  IF NO RELIEF AFTER 1 DOSE, CALL 911. 50 tablet 1    albuterol sulfate  (90 Base) MCG/ACT inhaler INHALE 2 PUFFS EVERY 6 HOURS AS NEEDED FOR WHEEZING 1 Inhaler 11    clopidogrel (PLAVIX) 75 MG tablet Take 1 tablet by mouth daily 30 tablet 0    metFORMIN (GLUCOPHAGE) 1000 MG tablet Take 1 tablet by mouth 2 times daily (with meals) 180 tablet 3    lisinopril (PRINIVIL;ZESTRIL) 10 MG tablet TAKE 1 TABLET BY MOUTH NIGHTLY 90 tablet 2    levothyroxine (SYNTHROID) 50 MCG tablet TAKE 1 TABLET BY MOUTH DAILY 30 tablet 11    citalopram (CELEXA) 20 MG tablet TAKE 1 TABLET BY MOUTH DAILY 30 tablet 11    SYMBICORT 80-4.5 MCG/ACT AERO INHALE 2 PUFFS INTO THE LUNGS 2 TIMES DAILY 1 Inhaler 11    gabapentin (NEURONTIN) 800 MG tablet Take 800 mg by mouth 3 times daily.        Spacer/Aero-Holding Chambers (E-Z SPACER) ALBA 1 Device by Does not apply route daily 1 Device 0    famotidine (PEPCID) 20 MG tablet TAKE 1 TABLET BY MOUTH TWICE A DAY 60 tablet 5    aspirin 81 MG chewable tablet Take 1 tablet by mouth daily 90 tablet 2     Current Facility-Administered Medications   Medication Dose Route Frequency Provider Last Rate Last Admin    furosemide (LASIX) tablet 40 mg  40 mg Oral Daily Adan Petit MD   Stopped at 02/25/21 0736    carvedilol (COREG) tablet 3.125 mg  3.125 mg Oral BID WC Donnie Santana MD   3.125 mg at 02/25/21 0803    citalopram (CELEXA) tablet 20 mg  20 mg Oral Daily Donnie Santana MD   20 mg at 02/24/21 1023    famotidine (PEPCID) tablet 20 mg  20 mg Oral BID Donnie Santana MD   20 mg at 02/25/21 3082    gabapentin (NEURONTIN) capsule 800 mg  800 mg Oral TID Donnie Santana MD   800 mg at 02/25/21 1773    levothyroxine (SYNTHROID) tablet 50 mcg  50 mcg Oral Daily Donnie Santana MD   50 mcg at 02/25/21 0803    budesonide-formoterol (SYMBICORT) 80-4.5 MCG/ACT inhaler 2 puff  2 puff Inhalation BID Donnie Santana MD   2 puff at 02/25/21 0812    tiotropium (SPIRIVA RESPIMAT) 2.5 MCG/ACT inhaler 2 puff  2 puff Inhalation Daily Donnie Santana MD   2 puff at 02/25/21 1290    sodium chloride flush 0.9 % injection 10 mL  10 mL Intravenous PRN Donnie Santana MD   10 mL at 02/25/21 0803    potassium chloride (KLOR-CON M) extended release tablet 40 mEq  40 mEq Oral PRN Donnie Santana MD        Or    potassium bicarb-citric acid (EFFER-K) effervescent tablet 40 mEq  40 mEq Oral PRN Donnie Santana MD        Or    potassium chloride 10 mEq/100 mL IVPB (Peripheral Line)  10 mEq Intravenous PRN Donnie Santana MD        magnesium sulfate 1000 mg in dextrose 5% 100 mL IVPB  1,000 mg Intravenous PRN Donnie Santana MD        promethazine (PHENERGAN) tablet 12.5 mg  12.5 mg Oral Q6H PRN Cait Ahmadi MD        Or    ondansetron [facility]) injection 4 mg  4 mg Intravenous Q6H PRN Cait Ahmadi MD        acetaminophen (TYLENOL) tablet 650 mg  650 mg Oral Q6H PRN Cait Ahmadi MD        Or    acetaminophen (TYLENOL) suppository 650 mg  650 mg Rectal Q6H PRN Cait Ahmadi MD        nicotine (NICODERM CQ) 14 MG/24HR 1 patch  1 patch Transdermal Daily Cait Ahmadi MD   1 patch at 21 0803    enoxaparin (LOVENOX) injection 30 mg  30 mg Subcutaneous BID Cait Ahmadi MD   Stopped at 21 1333    melatonin tablet 3 mg  3 mg Oral Nightly PRN Cait Ahmadi MD        ipratropium-albuterol (DUONEB) nebulizer solution 1 ampule  1 ampule Inhalation 4x Daily PRN Cait Ahmadi MD        clopidogrel (PLAVIX) tablet 75 mg  75 mg Oral Daily Cait Ahmadi MD   75 mg at 21 1023    ipratropium-albuterol (DUONEB) nebulizer solution 1 ampule  1 ampule Inhalation Q4H WA TERESA Jensen - CNP   1 ampule at 21 0812     Vital Signs (Current)   Vitals:    21 0051 21 0558 21 0800 21 0812   BP: 94/65 116/82 (!) 151/97    Pulse: 76 73 76    Resp: 18 17 18 16   Temp: 98 °F (36.7 °C) 97.7 °F (36.5 °C) 98.4 °F (36.9 °C)    TempSrc: Oral Oral Oral    SpO2: 96% 95% 94% 95%   Weight:  224 lb 6.9 oz (101.8 kg)     Height:                                              BP Readings from Last 3 Encounters:   21 (!) 151/97   21 130/73   20 115/75     Vital Signs Statistics (for past 48 hrs)     Temp  Av.7 °F (36.5 °C)  Min: 97.5 °F (36.4 °C)   Min taken time: 02/24/21 2145  Max: 98.4 °F (36.9 °C)   Max taken time: 21 0800  Pulse  Av.8  Min: 77   Min taken time: 21 2341  Max: 81   Max taken time: 21 1021  Resp  Av.1  Min: 12   Min taken time: 21 7820  Max: 18   Max taken time: 21 0800  BP  Min: 94/65   Min taken time: 21 0051  Max: 151/97   Max taken time: 21 0800  MAP (mmHg)  Av.7  Min: 70   Min taken time: 21  Max: 102   Max taken time: 21 204  SpO2  Av.6 %  Min: 91 %   Min taken time: 21  Max: 99 %   Max taken time: 21 2341  BP Readings from Last 3 Encounters:   21 (!) 151/97   21 130/73   20 115/75       BMI  Body mass index is 45.33 kg/m². Estimated body mass index is 45.33 kg/m² as calculated from the following:    Height as of this encounter: 4' 11\" (1.499 m). Weight as of this encounter: 224 lb 6.9 oz (101.8 kg).     CBC   Lab Results   Component Value Date    WBC 8.2 2021    RBC 4.95 2021    HGB 13.9 2021    HCT 42.4 2021    MCV 85.6 2021    RDW 13.7 2021     2021     CMP    Lab Results   Component Value Date     2021    K 4.1 2021    K 4.9 2021     2021    CO2 28 2021    BUN 20 2021    CREATININE 0.8 2021    GFRAA >60 2021    AGRATIO 1.0 2021    LABGLOM >60 2021    GLUCOSE 88 2021    PROT 7.1 2021    CALCIUM 9.1 2021    BILITOT 0.4 2021    ALKPHOS 82 2021    AST 11 2021    ALT 9 2021     BMP    Lab Results   Component Value Date     2021    K 4.1 2021    K 4.9 2021     2021    CO2 28 2021    BUN 20 2021    CREATININE 0.8 2021    CALCIUM 9.1 2021    GFRAA >60 2021    LABGLOM >60 2021    GLUCOSE 88 2021     POCGlucose  Recent Labs     21  0628 21  0702 21  0629   GLUCOSE 164* 81 88      Coags  No results found for: PROTIME, INR, APTT  HCG (If Applicable) No results found for: PREGTESTUR, PREGSERUM, HCG, HCGQUANT   ABGs No results found for: PHART, PO2ART, EPQ4QGG, JVX0UEB, BEART, D1UMFOFA   Type & Screen (If Applicable)  No results found for: LABABO, LABRH                         BMI: Wt Readings from Last 3 Encounters:       NPO Status:   Date of last liquid consumption: 02/24/21   Time of last liquid consumption: 2100   Date of last solid food consumption: 02/24/21      Time of last solid consumption: 2100       Anesthesia Evaluation  Patient summary reviewed no history of anesthetic complications:   Airway: Mallampati: I        Dental:    (+) edentulous      Pulmonary:   (+) COPD:  sleep apnea:  asthma: current smoker                           Cardiovascular:    (+) hypertension:, CAD:, CHF:, hyperlipidemia    (-) pacemaker    ECG reviewed      Echocardiogram reviewed                  Neuro/Psych:   (+) psychiatric history:            GI/Hepatic/Renal:   (+) morbid obesity     (-) no renal disease and bowel prep       Endo/Other:    (+) DiabetesType II DM, , hypothyroidism::., .    (-) hyperthyroidism               Abdominal:   (+) obese,         Vascular:                                        Anesthesia Plan      general     ASA 3       Induction: intravenous. MIPS: Prophylactic antiemetics administered and Postoperative trial extubation. Anesthetic plan and risks discussed with patient. Plan discussed with CRNA. Attending anesthesiologist reviewed and agrees with Pre Eval content              This pre-anesthesia assessment may be used as a history and physical.    DOS STAFF ADDENDUM:    Pt seen and examined, chart reviewed (including anesthesia, drug and allergy history). No interval changes to history and physical examination. Anesthetic plan, risks, benefits, alternatives, and personnel involved discussed with patient. Patient verbalized an understanding and agrees to proceed.       Harmony Chamberlain MD  February 25, 2021  11:27 AM

## 2021-02-25 NOTE — PLAN OF CARE
Problem: Falls - Risk of:  Goal: Will remain free from falls  Description: Will remain free from falls  2/25/2021 1028 by Rojas Gutierrez RN  Outcome: Ongoing     Problem: Falls - Risk of:  Goal: Absence of physical injury  Description: Absence of physical injury  2/25/2021 1028 by Rojas Gutierrez RN  Outcome: Ongoing     Problem: OXYGENATION/RESPIRATORY FUNCTION  Goal: Patient will maintain patent airway  2/25/2021 1028 by Rojas Gutierrez RN  Outcome: Ongoing     Problem: OXYGENATION/RESPIRATORY FUNCTION  Goal: Patient will achieve/maintain normal respiratory rate/effort  Description: Respiratory rate and effort will be within normal limits for the patient  2/25/2021 1028 by Rojas Gutierrez RN  Outcome: Ongoing     Problem: HEMODYNAMIC STATUS  Goal: Patient has stable vital signs and fluid balance  2/25/2021 1028 by Rojas Gutierrez RN  Outcome: Ongoing     Problem: FLUID AND ELECTROLYTE IMBALANCE  Goal: Fluid and electrolyte balance are achieved/maintained  2/25/2021 1028 by Rojas Gutierrez RN  Outcome: Ongoing     Problem: ACTIVITY INTOLERANCE/IMPAIRED MOBILITY  Goal: Mobility/activity is maintained at optimum level for patient  2/25/2021 1028 by Rojas Gutierrez RN  Outcome: Ongoing

## 2021-02-25 NOTE — PROGRESS NOTES
Per [facility] patients heart rate jumped to 166. This nurse checked on patient. Patient was sitting up in bed talking on the phone. Patient denied any chest pain. Vital signs stable. NP Lulu Collier made aware. No new orders. Will continue to provide assistance and education.  Electronically signed by Tio Adams RN on 2/25/2021 at 2:13 AM

## 2021-02-25 NOTE — CARE COORDINATION
CHINO [facility] [employer] [phone] Patient receives the following services: MOW and life alert. CHINO was asked if we could have our therapy team review patient for safety before discharge. CHINO will send a message to MD today. Respectfully submitted,    MADELIN Bowman  [facility]   [phone]    Electronically signed by MADELIN Lincoln on 2/25/2021 at 1:03 PM

## 2021-02-26 ENCOUNTER — APPOINTMENT (OUTPATIENT)
Dept: CT IMAGING | Age: 66
DRG: 147 | End: 2021-02-26
Payer: COMMERCIAL

## 2021-02-26 LAB
ANION GAP SERPL CALCULATED.3IONS-SCNC: 8 MMOL/L (ref 3–16)
BASOPHILS ABSOLUTE: 0 K/UL (ref 0–0.2)
BASOPHILS RELATIVE PERCENT: 0 %
BUN BLDV-MCNC: 16 MG/DL (ref 7–20)
CALCIUM SERPL-MCNC: 9.3 MG/DL (ref 8.3–10.6)
CHLORIDE BLD-SCNC: 102 MMOL/L (ref 99–110)
CO2: 26 MMOL/L (ref 21–32)
CREAT SERPL-MCNC: 0.7 MG/DL (ref 0.6–1.2)
EOSINOPHILS ABSOLUTE: 0 K/UL (ref 0–0.6)
EOSINOPHILS RELATIVE PERCENT: 0.1 %
GFR AFRICAN AMERICAN: >60
GFR NON-AFRICAN AMERICAN: >60
GLUCOSE BLD-MCNC: 114 MG/DL (ref 70–99)
HCT VFR BLD CALC: 42.1 % (ref 36–48)
HEMOGLOBIN: 13.7 G/DL (ref 12–16)
LV EF: 60 %
LVEF MODALITY: NORMAL
LYMPHOCYTES ABSOLUTE: 1.4 K/UL (ref 1–5.1)
LYMPHOCYTES RELATIVE PERCENT: 9.7 %
MAGNESIUM: 2 MG/DL (ref 1.8–2.4)
MCH RBC QN AUTO: 28 PG (ref 26–34)
MCHC RBC AUTO-ENTMCNC: 32.5 G/DL (ref 31–36)
MCV RBC AUTO: 86.2 FL (ref 80–100)
MONOCYTES ABSOLUTE: 0.6 K/UL (ref 0–1.3)
MONOCYTES RELATIVE PERCENT: 4.3 %
NEUTROPHILS ABSOLUTE: 12.6 K/UL (ref 1.7–7.7)
NEUTROPHILS RELATIVE PERCENT: 85.9 %
PDW BLD-RTO: 13.9 % (ref 12.4–15.4)
PLATELET # BLD: 277 K/UL (ref 135–450)
PMV BLD AUTO: 9.1 FL (ref 5–10.5)
POTASSIUM SERPL-SCNC: 4.7 MMOL/L (ref 3.5–5.1)
RBC # BLD: 4.89 M/UL (ref 4–5.2)
SODIUM BLD-SCNC: 136 MMOL/L (ref 136–145)
WBC # BLD: 14.7 K/UL (ref 4–11)

## 2021-02-26 PROCEDURE — 6370000000 HC RX 637 (ALT 250 FOR IP): Performed by: INTERNAL MEDICINE

## 2021-02-26 PROCEDURE — 85025 COMPLETE CBC W/AUTO DIFF WBC: CPT

## 2021-02-26 PROCEDURE — 71260 CT THORAX DX C+: CPT

## 2021-02-26 PROCEDURE — 6360000004 HC RX CONTRAST MEDICATION: Performed by: INTERNAL MEDICINE

## 2021-02-26 PROCEDURE — 70491 CT SOFT TISSUE NECK W/DYE: CPT

## 2021-02-26 PROCEDURE — 83735 ASSAY OF MAGNESIUM: CPT

## 2021-02-26 PROCEDURE — 94640 AIRWAY INHALATION TREATMENT: CPT

## 2021-02-26 PROCEDURE — 99233 SBSQ HOSP IP/OBS HIGH 50: CPT | Performed by: INTERNAL MEDICINE

## 2021-02-26 PROCEDURE — 6370000000 HC RX 637 (ALT 250 FOR IP): Performed by: NURSE PRACTITIONER

## 2021-02-26 PROCEDURE — 1200000000 HC SEMI PRIVATE

## 2021-02-26 PROCEDURE — 80048 BASIC METABOLIC PNL TOTAL CA: CPT

## 2021-02-26 PROCEDURE — 93306 TTE W/DOPPLER COMPLETE: CPT

## 2021-02-26 PROCEDURE — 2700000000 HC OXYGEN THERAPY PER DAY

## 2021-02-26 PROCEDURE — 36415 COLL VENOUS BLD VENIPUNCTURE: CPT

## 2021-02-26 PROCEDURE — 2580000003 HC RX 258: Performed by: ANESTHESIOLOGY

## 2021-02-26 PROCEDURE — 94761 N-INVAS EAR/PLS OXIMETRY MLT: CPT

## 2021-02-26 RX ADMIN — FAMOTIDINE 20 MG: 20 TABLET ORAL at 08:34

## 2021-02-26 RX ADMIN — AMIODARONE HYDROCHLORIDE 200 MG: 200 TABLET ORAL at 08:35

## 2021-02-26 RX ADMIN — IPRATROPIUM BROMIDE AND ALBUTEROL SULFATE 1 AMPULE: .5; 3 SOLUTION RESPIRATORY (INHALATION) at 11:48

## 2021-02-26 RX ADMIN — IPRATROPIUM BROMIDE AND ALBUTEROL SULFATE 1 AMPULE: .5; 3 SOLUTION RESPIRATORY (INHALATION) at 20:14

## 2021-02-26 RX ADMIN — CITALOPRAM HYDROBROMIDE 20 MG: 20 TABLET ORAL at 08:35

## 2021-02-26 RX ADMIN — BUDESONIDE AND FORMOTEROL FUMARATE DIHYDRATE 2 PUFF: 80; 4.5 AEROSOL RESPIRATORY (INHALATION) at 20:14

## 2021-02-26 RX ADMIN — BUDESONIDE AND FORMOTEROL FUMARATE DIHYDRATE 2 PUFF: 80; 4.5 AEROSOL RESPIRATORY (INHALATION) at 08:24

## 2021-02-26 RX ADMIN — TIOTROPIUM BROMIDE INHALATION SPRAY 2 PUFF: 3.12 SPRAY, METERED RESPIRATORY (INHALATION) at 08:24

## 2021-02-26 RX ADMIN — LEVOTHYROXINE SODIUM 50 MCG: 0.05 TABLET ORAL at 06:24

## 2021-02-26 RX ADMIN — IPRATROPIUM BROMIDE AND ALBUTEROL SULFATE 1 AMPULE: .5; 3 SOLUTION RESPIRATORY (INHALATION) at 08:24

## 2021-02-26 RX ADMIN — FAMOTIDINE 20 MG: 20 TABLET ORAL at 21:43

## 2021-02-26 RX ADMIN — SODIUM CHLORIDE: 9 INJECTION, SOLUTION INTRAVENOUS at 08:35

## 2021-02-26 RX ADMIN — CARVEDILOL 25 MG: 25 TABLET, FILM COATED ORAL at 08:35

## 2021-02-26 RX ADMIN — AMIODARONE HYDROCHLORIDE 200 MG: 200 TABLET ORAL at 21:43

## 2021-02-26 RX ADMIN — CLOPIDOGREL BISULFATE 75 MG: 75 TABLET ORAL at 08:35

## 2021-02-26 RX ADMIN — SODIUM CHLORIDE: 9 INJECTION, SOLUTION INTRAVENOUS at 21:45

## 2021-02-26 RX ADMIN — IOPAMIDOL 75 ML: 755 INJECTION, SOLUTION INTRAVENOUS at 16:13

## 2021-02-26 RX ADMIN — GABAPENTIN 300 MG: 300 CAPSULE ORAL at 08:35

## 2021-02-26 RX ADMIN — CARVEDILOL 25 MG: 25 TABLET, FILM COATED ORAL at 18:18

## 2021-02-26 ASSESSMENT — PAIN SCALES - GENERAL
PAINLEVEL_OUTOF10: 0
PAINLEVEL_OUTOF10: 0

## 2021-02-26 NOTE — PROGRESS NOTES
[address]  Cardiology Consult Note        CC:     ? AFib           HPI:   This is a 72 y.o. female who came to the ER for severe lightheadedness and presyncope. We are asked to see her for new onset A. fib flutter last night at a rate of 150. Admission EKG shows sinus rhythm.   She is in sinus rhythm now appears to have self converted back to normal rhythm    She has a past medical history of CAD stenting hypertension diabetes obstructive sleep apnea and COPD      Presently there is mention about chest pain on her admission but she did not state that to me        Past Medical History:   Diagnosis Date    Asthma     CAD (coronary artery disease)     [facility] failure) [facility]     COPD (chronic obstructive pulmonary disease) ([facility]     Depression     Diabetes mellitus ([facility]     Hyperlipidemia     Hypertension     Hypothyroidism     Morbid obesity with BMI of 40.0-44.9, adult ([facility] 2019    Sleep apnea     Substance abuse ([facility]     Thyroid disease     Type 2 diabetes mellitus without complication ([facility]       Past Surgical History:   Procedure Laterality Date    ADENOIDECTOMY      CARDIAC SURGERY       SECTION      x3    CORONARY ANGIOPLASTY WITH STENT PLACEMENT      EYE SURGERY      HERNIA REPAIR      HYSTERECTOMY      LARYNGOSCOPY N/A 2021    DIRECT LARYNGOSCOPY performed by Jayla Garcia MD at [place] [address] N/A 2021    ORAL PHARYNGEAL BIOPSY performed by [facility] at [facility] N/A 2021    NASAL ENDOSCOPY performed by Jayla Garcia MD at [place] [address].        Family History   Problem Relation Age of Onset    Heart Disease Mother     High Blood Pressure Mother     Diabetes Mother     Heart Disease Father     High Blood Pressure Father     Diabetes Father       Social History     Tobacco Use    Smoking status: Former Smoker     Packs/day: 1.25     Years: 45.00     Pack years: 56.25     Types: Cigarettes     Quit date: 2019     Years since quittin.4    Smokeless tobacco: Never Used   Substance Use Topics    Alcohol use: No    Drug use: No     Allergies   Allergen Reactions    Oxycodone-Acetaminophen Nausea And Vomiting     Rash.  Tramadol Other (See Comments)     States causes severe nausea and vomiting.     Codeine     Hydrocodone-Acetaminophen Nausea And Vomiting      amiodarone  200 mg Oral BID    gabapentin  300 mg Oral TID    carvedilol  25 mg Oral BID WC    [Held by provider] furosemide  40 mg Oral Daily    citalopram  20 mg Oral Daily    famotidine  20 mg Oral BID    levothyroxine  50 mcg Oral Daily    budesonide-formoterol  2 puff Inhalation BID    tiotropium  2 puff Inhalation Daily    nicotine  1 patch Transdermal Daily    enoxaparin  30 mg Subcutaneous BID    clopidogrel  75 mg Oral Daily    ipratropium-albuterol  1 ampule Inhalation Q4H WA       Review of Systems -   Constitutional: Negative for weight gain/loss; malaise, fever  Respiratory: Negative for Asthma;  cough and hemoptysis  Cardiovascular: Negative for palpitations,dizziness   Gastrointestinal: Negative for abd.pain; constipation/diarrhea;    Genitourinary: Negative for stones; hematuria; frequency hesitancy  Integumentt: Negative for rash or pruritis  Hematologic/lymphatic: Negative for blood dyscrasia; leukemia/lymphoma  Musculoskeletal: Negative for Connective tissue disease  Neurological:  Negative for Seizure   Behavioral/Psych:Negative for Bipolar disorder, Schizophrenia; Dementia  Endocrine: negative for thyroid, parathyroid disease      Intake/Output Summary (Last 24 hours) at 2021 1317  Last data filed at 2021 1205  Gross per 24 hour   Intake 2631.25 ml   Output 1420 ml   Net 1211.25 ml       Physical Examination:    /72   Pulse 70   Temp 98.1 °F (36.7 °C) (Oral)   Resp 16   Ht 4' 11\" (1.499 m)   Wt 222 lb 3.6 oz (100.8 kg)   SpO2 92% BMI 44.88 kg/m²    HEENT:  Face: Atraumatic, Conjunctiva: Pink; non icteric,  Mucous Memb:  Moist, No thyromegaly or Lymphadenopathy  Respiratory:  Resp Assessment: normal, Resp Auscultation: clear   Cardiovascular: Auscultation: nl S1 & S2, Palpation:  Nl PMI;  No heaves or thrills, JVP:  normal  Abdomen: Soft, non-tender, Normal bowel sounds,  No organomegaly  Extremities: No Cyanosis or Clubbing; Edema none  Neurological: Oriented to time, place, and person, Non-anxious  Psychiatric: Normal mood and affect  Skin: Warm and dry,  No rash seen      Current Facility-Administered Medications: 0.9 % sodium chloride infusion, , Intravenous, Continuous  amiodarone (CORDARONE) tablet 200 mg, 200 mg, Oral, BID  gabapentin (NEURONTIN) capsule 300 mg, 300 mg, Oral, TID  carvedilol (COREG) tablet 25 mg, 25 mg, Oral, BID WC  [Held by provider] furosemide (LASIX) tablet 40 mg, 40 mg, Oral, Daily  citalopram (CELEXA) tablet 20 mg, 20 mg, Oral, Daily  famotidine (PEPCID) tablet 20 mg, 20 mg, Oral, BID  levothyroxine (SYNTHROID) tablet 50 mcg, 50 mcg, Oral, Daily  budesonide-formoterol (SYMBICORT) 80-4.5 MCG/ACT inhaler 2 puff, 2 puff, Inhalation, BID  tiotropium (SPIRIVA RESPIMAT) 2.5 MCG/ACT inhaler 2 puff, 2 puff, Inhalation, Daily  sodium chloride flush 0.9 % injection 10 mL, 10 mL, Intravenous, PRN  potassium chloride (KLOR-CON M) extended release tablet 40 mEq, 40 mEq, Oral, PRN **OR** potassium bicarb-citric acid (EFFER-K) effervescent tablet 40 mEq, 40 mEq, Oral, PRN **OR** potassium chloride 10 mEq/100 mL IVPB (Peripheral Line), 10 mEq, Intravenous, PRN  magnesium sulfate 1000 mg in dextrose 5% 100 mL IVPB, 1,000 mg, Intravenous, PRN  promethazine (PHENERGAN) tablet 12.5 mg, 12.5 mg, Oral, Q6H PRN **OR** ondansetron (ZOFRAN) injection 4 mg, 4 mg, Intravenous, Q6H PRN  acetaminophen (TYLENOL) tablet 650 mg, 650 mg, Oral, Q6H PRN **OR** acetaminophen (TYLENOL) suppository 650 mg, 650 mg, Rectal, Q6H PRN  nicotine daily  Chads vasc score (age, CAD, HTN, DM, = 4)  Will probably need long-term anticoagulation    Started on amiodarone yesterday remains in sinus rhythm  Consider starting the patient on Eliquis 5 mg twice daily      Hypertension  Increase Coreg to 25 twice daily    History of CAD  Status post stent to the RCA and LAD in 2016      Patient going for oropharyngeal biopsy          Stephon Frey M.D  2/26/2021

## 2021-02-26 NOTE — PROGRESS NOTES
Hospitalist Progress Note      PCP: TERESA Márquez - SHIVANI    Date of Admission: 2/22/2021    Chief Complaint: lightheaded. Subjective:     Lightheadedness resolved. Feels better. Going to ECHO. S/p Biopsy of oral lesion 2/25 with ENT           Medications:  Reviewed    Infusion Medications    sodium chloride 75 mL/hr at 02/26/21 5688     Scheduled Medications    amiodarone  200 mg Oral BID    gabapentin  300 mg Oral TID    carvedilol  25 mg Oral BID WC    [Held by provider] furosemide  40 mg Oral Daily    citalopram  20 mg Oral Daily    famotidine  20 mg Oral BID    levothyroxine  50 mcg Oral Daily    budesonide-formoterol  2 puff Inhalation BID    tiotropium  2 puff Inhalation Daily    nicotine  1 patch Transdermal Daily    enoxaparin  30 mg Subcutaneous BID    clopidogrel  75 mg Oral Daily    ipratropium-albuterol  1 ampule Inhalation Q4H WA     PRN Meds: sodium chloride flush, potassium chloride **OR** potassium alternative oral replacement **OR** potassium chloride, magnesium sulfate, promethazine **OR** ondansetron, acetaminophen **OR** acetaminophen, melatonin, ipratropium-albuterol      Intake/Output Summary (Last 24 hours) at 2/26/2021 1205  Last data filed at 2/26/2021 0943  Gross per 24 hour   Intake 2561.25 ml   Output 1420 ml   Net 1141.25 ml       Physical Exam Performed:    /72   Pulse 70   Temp 98.1 °F (36.7 °C) (Oral)   Resp 16   Ht 4' 11\" (1.499 m)   Wt 222 lb 3.6 oz (100.8 kg)   SpO2 92%   BMI 44.88 kg/m²     General appearance: No apparent distress, appears stated age and cooperative. HEENT: Pupils equal, round, and reactive to light. Conjunctivae/corneas clear. Neck: Supple, with full range of motion. No jugular venous distention. Trachea midline. Respiratory:  Normal respiratory effort. Clear to auscultation, bilaterally without Rales/Wheezes/Rhonchi.   Cardiovascular: Regular rate and rhythm with normal S1/S2 without murmurs, rubs or gallops. Abdomen: Soft, non-tender, non-distended with normal bowel sounds. Musculoskeletal: No clubbing, cyanosis or edema bilaterally. Full range of motion without deformity. Skin: Skin color, texture, turgor normal.  No rashes or lesions. Neurologic:  Neurovascularly intact without any focal sensory/motor deficits. Cranial nerves: II-XII intact, grossly non-focal.  Psychiatric: Alert and oriented, thought content appropriate, normal insight  Capillary Refill: Brisk,< 3 seconds   Peripheral Pulses: +2 palpable, equal bilaterally       Labs:   Recent Labs     02/24/21  0702 02/25/21  0629 02/26/21  0609   WBC 9.0 8.2 14.7*   HGB 13.9 13.9 13.7   HCT 42.2 42.4 42.1    272 277     Recent Labs     02/24/21  0702 02/25/21  0629 02/26/21  0609    138 136   K 4.3 4.1 4.7    101 102   CO2 25 28 26   BUN 16 20 16   CREATININE 0.8 0.8 0.7   CALCIUM 9.1 9.1 9.3     No results for input(s): AST, ALT, BILIDIR, BILITOT, ALKPHOS in the last 72 hours. No results for input(s): INR in the last 72 hours. No results for input(s): Jessa [place] in the last 72 hours. Urinalysis:      Lab Results   Component Value Date    NITRU Negative 02/25/2021    WBCUA 0-2 03/04/2017    RBCUA 0-2 03/04/2017    BLOODU Negative 02/25/2021    SPECGRAV 1.018 02/25/2021    GLUCOSEU Negative 02/25/2021       Radiology:  VL DUP CAROTID BILATERAL   Final Result      CT Head WO Contrast   Final Result   No acute intracranial process. Mild paranasal sinus disease. CTA HEAD NECK W CONTRAST   Final Result   Possible moderate or severe stenosis of the proximal left internal carotid   artery, which is obscured by motion artifact. No large vessel occlusion in the head or neck.          XR CHEST PORTABLE   Final Result   No active cardiopulmonary disease                 Assessment/Plan:    Active Hospital Problems    Diagnosis    Light headed [R42]    Morbid obesity with BMI of 40.0-44.9, adult ([facility] [E66.01,

## 2021-02-26 NOTE — CONSULTS
Department of Radiation Oncology  Attending Consult Note      Reason for Consult:  Soft palate mass  Requesting Physician:  Dr. Alexandra Officer:  Sore Throat    HISTORY OF PRESENT ILLNESS:      The patient is a 72 y.o. Woman who had a several month history of some sore throat and speech changes but presented to the hospital due to cardiac concerns and syncopal episodes. She has a significant smoking history of roughly 2 packs/day for over 40 years. During her work-up she was found to have a mass in the left soft palate. She was seen by Dr. Marcella Opitz with ENT who noted a roughly 2 cm lesion in the posterior left soft palate. The patient had a CTA of the head and neck on 2/22/2021 which showed no notable findings. On my review, the patient does have some mildly enlarged lymph nodes in the bilateral level 2 up to 7 mm in short axis, not enlarged by size criteria. On 2/25/2021 Dr. Marcella Opitz performed a diagnostic laryngoscopy and biopsy of the 2 cm circular ulcerative lesion in the posterior left soft palate and superior aspect of the anterior tonsillar pillar. 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 P16 was negative. At this time the patient notes that she would have significant difficulty logistically in any form of daily treatment for her oropharyngeal cancer. She inquires about the possibility of surgery versus radiation or chemotherapy. She notes that she is attempting smoking cessation.     Cancer Treatment History:  none    Past Medical History:    Past Medical History:   Diagnosis Date    Asthma     CAD (coronary artery disease)     CHF (congestive heart failure) (HCC)     [facility]ch[facility]) [facility]     Depression     Diabetes mellitus (HCC)     Hyperlipidemia     Hypertension     Hypothyroidism     Morbid obesity with BMI of 40.0-44.9, adult ([facility] 1/29/2019    Sleep apnea     Substance abuse ([facility]     Thyroid disease     Type 2 diabetes mellitus without complication [facility]        Past Surgical History:    Past Surgical History:   Procedure Laterality Date    ADENOIDECTOMY      CARDIAC SURGERY       SECTION      x3    CORONARY ANGIOPLASTY WITH STENT PLACEMENT      EYE SURGERY      HERNIA REPAIR      HYSTERECTOMY      LARYNGOSCOPY N/A 2021    DIRECT LARYNGOSCOPY performed by Ann Marie Eaton MD at [place] [address] N/A 2021    ORAL PHARYNGEAL BIOPSY performed by Ann Marie Eaton MD at [address] [place] [address] N/A 2021    NASAL ENDOSCOPY performed by Ann Marie Eaton MD at [facility].           Current Medications:      Current Facility-Administered Medications:     0.9 % sodium chloride infusion, , Intravenous, Continuous, Deborah Bardales MD, Last Rate: 75 mL/hr at 21 08, New Bag at 21 08    amiodarone (CORDARONE) tablet 200 mg, 200 mg, Oral, BID, Jayna Butt MD, 200 mg at 21 0835    carvedilol (COREG) tablet 25 mg, 25 mg, Oral, BID WC, Jayna Butt MD, 25 mg at 21 0835    [Held by provider] furosemide (LASIX) tablet 40 mg, 40 mg, Oral, Daily, Mariela Hou MD, Stopped at 21 0736    citalopram (CELEXA) tablet 20 mg, 20 mg, Oral, Daily, Julee Shoemaker MD, 20 mg at 21 0835    famotidine (PEPCID) tablet 20 mg, 20 mg, Oral, BID, Julee Shoemaker MD, 20 mg at 21 3989    levothyroxine (SYNTHROID) tablet 50 mcg, 50 mcg, Oral, Daily, Julee Shoemaker MD, 50 mcg at 21 2728    budesonide-formoterol (SYMBICORT) 80-4.5 MCG/ACT inhaler 2 puff, 2 puff, Inhalation, BID, Julee Shoemaker MD, 2 puff at 21 0824    tiotropium (SPIRIVA RESPIMAT) 2.5 MCG/ACT inhaler 2 puff, 2 puff, Inhalation, Daily, Julee Shoemaker MD, 2 puff at 21 0824    sodium chloride flush 0.9 % injection 10 mL, 10 mL, Intravenous, PRN, Julee Shoemaker MD, 10 mL at 21 0803    potassium chloride (KLOR-CON M) extended release tablet 40 mEq, 40 mEq, Oral, PRN **OR** potassium bicarb-citric acid (EFFER-K) effervescent tablet 40 mEq, 40 mEq, Oral, PRN **OR** potassium chloride 10 mEq/100 mL IVPB (Peripheral Line), 10 mEq, Intravenous, PRN, Tl Cintron MD    magnesium sulfate 1000 mg in dextrose 5% 100 mL IVPB, 1,000 mg, Intravenous, PRN, Tl Cintron MD    promethazine (PHENERGAN) tablet 12.5 mg, 12.5 mg, Oral, Q6H PRN **OR** ondansetron (ZOFRAN) injection 4 mg, 4 mg, Intravenous, Q6H PRN, Tl Cintron MD    acetaminophen (TYLENOL) tablet 650 mg, 650 mg, Oral, Q6H PRN **OR** acetaminophen (TYLENOL) suppository 650 mg, 650 mg, Rectal, Q6H PRN, Tl Cintron MD    nicotine (NICODERM CQ) 14 MG/24HR 1 patch, 1 patch, Transdermal, Daily, Tl Cintron MD, 1 patch at 02/26/21 0834    enoxaparin (LOVENOX) injection 30 mg, 30 mg, Subcutaneous, BID, Tl Cintron MD, Stopped at 02/24/21 1333    melatonin tablet 3 mg, 3 mg, Oral, Nightly PRN, Tl Cintron MD    ipratropium-albuterol (DUONEB) nebulizer solution 1 ampule, 1 ampule, Inhalation, 4x Daily PRN, Tl Cintron MD    clopidogrel (PLAVIX) tablet 75 mg, 75 mg, Oral, Daily, Tl Cintron MD, 75 mg at 02/26/21 0835    ipratropium-albuterol (DUONEB) nebulizer solution 1 ampule, 1 ampule, Inhalation, Q4H WA, TERESA Ortiz - CNP, 1 ampule at 02/26/21 1148      Allergies:  Review of patient's allergies indicates no known allergies. Social History:    Social History     Socioeconomic History    Marital status:       Spouse name: Not on file    Number of children: Not on file    Years of education: Not on file    Highest education level: Not on file   Occupational History    Not on file   Social Needs    Financial resource strain: Not on file    Food insecurity     Worry: Not on file     Inability: Not on file    Transportation needs     Medical: Not on file     Non-medical: Not on file Tobacco Use    Smoking status: Former Smoker     Packs/day: 1.25     Years: 45.00     Pack years: 56.25     Types: Cigarettes     Quit date: 2019     Years since quittin.4    Smokeless tobacco: Never Used   Substance and Sexual Activity    Alcohol use: No    Drug use: No    Sexual activity: Not Currently   Lifestyle    Physical activity     Days per week: Not on file     Minutes per session: Not on file    Stress: Not on file   Relationships    Social connections     Talks on phone: Not on file     Gets together: Not on file     Attends Confucianism service: Not on file     Active member of club or organization: Not on file     Attends meetings of clubs or organizations: Not on file     Relationship status: Not on file    Intimate partner violence     Fear of current or ex partner: Not on file     Emotionally abused: Not on file     Physically abused: Not on file     Forced sexual activity: Not on file   Other Topics Concern    Not on file   Social History Narrative    Not on file       Family History:   Family History   Problem Relation Age of Onset    Heart Disease Mother     High Blood Pressure Mother     Diabetes Mother     Heart Disease Father     High Blood Pressure Father     Diabetes Father        REVIEW OF SYSTEMS:    Constitutional:  No weight loss, No fever, No chills, No night sweats. Energy level good. Eyes:  No impairment or change in vision  ENT / Mouth:  No bleeding.  Some throat discomfort as above  Cardiovascular:  No chest pain, palpitations, new edema, or calf discomfort  Respiratory:  No pain, hemoptysis, change to breathing  Gastrointestinal:  No pain, cramping, jaundice, change to eating and bowel habits  Urinary:  No pain, bleeding or change in continence  Musculoskeletal:  No redness, pain, edema or weakness  Skin:  No pruritus, rash, change to nodules or lesions  Neurologic:  No new numbness or tingling  Hematologic: No petechiae, ecchymosis or bleeding  Lymphatic: No lymphadenopathy or lymphedema  Allergy / Immunologic:  No eczema, hives, frequent or recurrent infections    PHYSICAL EXAM:      Vitals:    Vitals:    02/26/21 1149   BP:    Pulse:    Resp: 16   Temp:    SpO2: 92%       GENERAL: Awake, alert, and oriented, no apparent distress  EYES: Sclera clear, conjunviva normal  ENT: Normocephalic, atraumatic, There is a 2 cm irregular ulcerated lesion in the posterior soft palate to the left of midline. NECK: Symmetrical, no visualized masses  LUNGS: No increased work of breathing, no audible wheezing  ABDOMEN: Non-distended, symmetrical  MUSCULOSKELETAL: normal range of motion, normal ambulation  NEUROLOGIC: alert, gross motor skills intact  SKIN: no visible jaundice, rash, or bleeding  EXTREMETIES: No visible clubbing or edema    DATA:    I personally reviewed the Available images including a CTA of the head and neck. It is difficult to discern the primary lesion on this imaging. 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 Dr. Isael Hicks of the neck and chest for further work-up at this time. Close review of both the primary lesion in the soft palate and the neck to better characterize local extent of disease will be helpful. I am somewhat concerned about the subtly enlarged lymph nodes in the level 2 neck bilaterally, and further imaging would be helpful. Pending this imaging, further work-up and possibly outpatient PET could be helpful as well, especially if there are any concerns over borderline areas of adenopathy or other findings.     I discussed with the patient that treatment for this type of cancer involved either primary surgery or primary radiation / chemoradiation. As is, she may be a candidate for definitive surgery. I did note that depending on findings at the time of surgery and risk factors she may need further adjuvant treatment including radiation or chemoradiation after her surgery. The patient does have significant social and logistical constraints in terms of being able to comply with daily radiation treatment over a 6 to 7-week course. I did discuss with the patient possible options and resources to help with transportation should that be necessary. I discussed the case with Dr. Dionna Tse and Dr. Imelda Woodruff with medical oncology for improved multidisciplinary coordination. I will plan to see the patient back in my office as an outpatient after completion of her work-up for further consideration of treatment approaches. I encouraged the patient to continue smoking cessation both for short-term and long-term benefits. Thank you for inviting me to participate in Mrs Denton's care.       Jamaica Bravo MD  [facility] Radiation Oncology  [phone]    ECOG Performance Status (ECOG Scale 0-4):  1

## 2021-02-26 NOTE — PROGRESS NOTES
Postoperative day 1 from biopsy of the palate lesion. Patient says she is really not that sore. Exam  Stable left palate lesion with area that was cauterized    Plan  In all likelihood this is going to be a squamous cell carcinoma, but pathology is not back. Patient has some transportation issues and is could be somewhat difficult to complete her cancer work-up as an outpatient.    -I have asked Aristeo Allen (radiation oncology) and Dr. Rico Howard (oncology) to see her as an inpatient so we can start to devise a plan. -If she has no evidence of metastatic disease on the CT chest that I ordered my initial thought is that she would probably tolerate an upfront surgery a little better than radiation and/or chemotherapy. She has a lot of medical issues including obesity, heart issues and needs supplemental oxygen. Mobility transportation issues would also come into play if she had to get 7 weeks of radiation. Surgery would include resection of her palatal lesion and a selective neck dissection on the same side. If I can get negative margins and the neck did not show significant bryce disease, she may only need surgery as treatment. I will talk with oncology and radiation oncology after they see her to see if they would agree with this plan. If they feel as though she would be better served by radiation and/or chemo, we can work out a way to do that for her as well.

## 2021-02-27 LAB
ANION GAP SERPL CALCULATED.3IONS-SCNC: 9 MMOL/L (ref 3–16)
BASOPHILS ABSOLUTE: 0 K/UL (ref 0–0.2)
BASOPHILS RELATIVE PERCENT: 0.5 %
BUN BLDV-MCNC: 11 MG/DL (ref 7–20)
CALCIUM SERPL-MCNC: 9.2 MG/DL (ref 8.3–10.6)
CHLORIDE BLD-SCNC: 104 MMOL/L (ref 99–110)
CO2: 27 MMOL/L (ref 21–32)
CREAT SERPL-MCNC: 0.7 MG/DL (ref 0.6–1.2)
EOSINOPHILS ABSOLUTE: 0.1 K/UL (ref 0–0.6)
EOSINOPHILS RELATIVE PERCENT: 1.6 %
GFR AFRICAN AMERICAN: >60
GFR NON-AFRICAN AMERICAN: >60
GLUCOSE BLD-MCNC: 99 MG/DL (ref 70–99)
HCT VFR BLD CALC: 39.3 % (ref 36–48)
HEMOGLOBIN: 13.1 G/DL (ref 12–16)
LYMPHOCYTES ABSOLUTE: 3.1 K/UL (ref 1–5.1)
LYMPHOCYTES RELATIVE PERCENT: 33.7 %
MAGNESIUM: 1.6 MG/DL (ref 1.8–2.4)
MCH RBC QN AUTO: 28.7 PG (ref 26–34)
MCHC RBC AUTO-ENTMCNC: 33.3 G/DL (ref 31–36)
MCV RBC AUTO: 86.2 FL (ref 80–100)
MONOCYTES ABSOLUTE: 0.7 K/UL (ref 0–1.3)
MONOCYTES RELATIVE PERCENT: 7.3 %
NEUTROPHILS ABSOLUTE: 5.3 K/UL (ref 1.7–7.7)
NEUTROPHILS RELATIVE PERCENT: 56.9 %
PDW BLD-RTO: 13.6 % (ref 12.4–15.4)
PLATELET # BLD: 252 K/UL (ref 135–450)
PMV BLD AUTO: 9.5 FL (ref 5–10.5)
POTASSIUM SERPL-SCNC: 4.3 MMOL/L (ref 3.5–5.1)
RBC # BLD: 4.56 M/UL (ref 4–5.2)
SODIUM BLD-SCNC: 140 MMOL/L (ref 136–145)
WBC # BLD: 9.3 K/UL (ref 4–11)

## 2021-02-27 PROCEDURE — 6370000000 HC RX 637 (ALT 250 FOR IP): Performed by: INTERNAL MEDICINE

## 2021-02-27 PROCEDURE — 2700000000 HC OXYGEN THERAPY PER DAY

## 2021-02-27 PROCEDURE — 94760 N-INVAS EAR/PLS OXIMETRY 1: CPT

## 2021-02-27 PROCEDURE — 36415 COLL VENOUS BLD VENIPUNCTURE: CPT

## 2021-02-27 PROCEDURE — 94640 AIRWAY INHALATION TREATMENT: CPT

## 2021-02-27 PROCEDURE — 80048 BASIC METABOLIC PNL TOTAL CA: CPT

## 2021-02-27 PROCEDURE — 1200000000 HC SEMI PRIVATE

## 2021-02-27 PROCEDURE — 6370000000 HC RX 637 (ALT 250 FOR IP): Performed by: NURSE PRACTITIONER

## 2021-02-27 PROCEDURE — 85025 COMPLETE CBC W/AUTO DIFF WBC: CPT

## 2021-02-27 PROCEDURE — 99232 SBSQ HOSP IP/OBS MODERATE 35: CPT | Performed by: INTERNAL MEDICINE

## 2021-02-27 PROCEDURE — 6360000002 HC RX W HCPCS: Performed by: INTERNAL MEDICINE

## 2021-02-27 PROCEDURE — 83735 ASSAY OF MAGNESIUM: CPT

## 2021-02-27 RX ORDER — AMITRIPTYLINE HYDROCHLORIDE 10 MG/1
1 TABLET, FILM COATED ORAL 3 TIMES DAILY PRN
Status: ON HOLD | COMMUNITY
Start: 2020-11-28 | End: 2021-03-02 | Stop reason: HOSPADM

## 2021-02-27 RX ORDER — TRAZODONE HYDROCHLORIDE 50 MG/1
1 TABLET ORAL PRN
Status: ON HOLD | COMMUNITY
Start: 2020-11-28 | End: 2021-03-02 | Stop reason: SDUPTHER

## 2021-02-27 RX ORDER — TRAZODONE HYDROCHLORIDE 50 MG/1
50 TABLET ORAL NIGHTLY
Status: DISCONTINUED | OUTPATIENT
Start: 2021-02-27 | End: 2021-03-02 | Stop reason: HOSPADM

## 2021-02-27 RX ORDER — HYDROXYZINE HYDROCHLORIDE 10 MG/1
10 TABLET, FILM COATED ORAL 3 TIMES DAILY PRN
Status: DISCONTINUED | OUTPATIENT
Start: 2021-02-27 | End: 2021-03-02 | Stop reason: HOSPADM

## 2021-02-27 RX ORDER — MAGNESIUM SULFATE IN WATER 40 MG/ML
2000 INJECTION, SOLUTION INTRAVENOUS ONCE
Status: DISCONTINUED | OUTPATIENT
Start: 2021-02-27 | End: 2021-02-27

## 2021-02-27 RX ADMIN — CARVEDILOL 25 MG: 25 TABLET, FILM COATED ORAL at 09:24

## 2021-02-27 RX ADMIN — IPRATROPIUM BROMIDE AND ALBUTEROL SULFATE 1 AMPULE: .5; 3 SOLUTION RESPIRATORY (INHALATION) at 16:25

## 2021-02-27 RX ADMIN — AMIODARONE HYDROCHLORIDE 200 MG: 200 TABLET ORAL at 22:07

## 2021-02-27 RX ADMIN — BUDESONIDE AND FORMOTEROL FUMARATE DIHYDRATE 2 PUFF: 80; 4.5 AEROSOL RESPIRATORY (INHALATION) at 08:21

## 2021-02-27 RX ADMIN — MAGNESIUM SULFATE HEPTAHYDRATE 1000 MG: 1 INJECTION, SOLUTION INTRAVENOUS at 11:32

## 2021-02-27 RX ADMIN — FAMOTIDINE 20 MG: 20 TABLET ORAL at 09:24

## 2021-02-27 RX ADMIN — MAGNESIUM SULFATE HEPTAHYDRATE 1000 MG: 1 INJECTION, SOLUTION INTRAVENOUS at 09:36

## 2021-02-27 RX ADMIN — TRAZODONE HYDROCHLORIDE 50 MG: 50 TABLET ORAL at 21:29

## 2021-02-27 RX ADMIN — AMIODARONE HYDROCHLORIDE 200 MG: 200 TABLET ORAL at 09:24

## 2021-02-27 RX ADMIN — FAMOTIDINE 20 MG: 20 TABLET ORAL at 21:29

## 2021-02-27 RX ADMIN — IPRATROPIUM BROMIDE AND ALBUTEROL SULFATE 1 AMPULE: .5; 3 SOLUTION RESPIRATORY (INHALATION) at 12:14

## 2021-02-27 RX ADMIN — LEVOTHYROXINE SODIUM 50 MCG: 0.05 TABLET ORAL at 06:28

## 2021-02-27 RX ADMIN — CLOPIDOGREL BISULFATE 75 MG: 75 TABLET ORAL at 09:24

## 2021-02-27 RX ADMIN — TIOTROPIUM BROMIDE INHALATION SPRAY 2 PUFF: 3.12 SPRAY, METERED RESPIRATORY (INHALATION) at 08:21

## 2021-02-27 RX ADMIN — IPRATROPIUM BROMIDE AND ALBUTEROL SULFATE 1 AMPULE: .5; 3 SOLUTION RESPIRATORY (INHALATION) at 08:21

## 2021-02-27 RX ADMIN — CARVEDILOL 25 MG: 25 TABLET, FILM COATED ORAL at 17:45

## 2021-02-27 RX ADMIN — IPRATROPIUM BROMIDE AND ALBUTEROL SULFATE 1 AMPULE: .5; 3 SOLUTION RESPIRATORY (INHALATION) at 20:12

## 2021-02-27 RX ADMIN — BUDESONIDE AND FORMOTEROL FUMARATE DIHYDRATE 2 PUFF: 80; 4.5 AEROSOL RESPIRATORY (INHALATION) at 20:12

## 2021-02-27 ASSESSMENT — PAIN SCALES - GENERAL: PAINLEVEL_OUTOF10: 0

## 2021-02-27 NOTE — PROGRESS NOTES
Hospitalist Progress Note      PCP: Cecelia March, APRN - NP    Date of Admission: 2/22/2021    Chief Complaint: lightheaded. Subjective:     Lightheadedness resolved. Feels better. S/p Biopsy of oral lesion 2/25 with ENT - path pending. Afib much better control - she is tolerating coreg and amio well. Medications:  Reviewed    Infusion Medications     Scheduled Medications    traZODone  50 mg Oral Nightly    magnesium sulfate  2,000 mg Intravenous Once    amiodarone  200 mg Oral BID    carvedilol  25 mg Oral BID WC    furosemide  40 mg Oral Daily    famotidine  20 mg Oral BID    levothyroxine  50 mcg Oral Daily    budesonide-formoterol  2 puff Inhalation BID    tiotropium  2 puff Inhalation Daily    nicotine  1 patch Transdermal Daily    enoxaparin  30 mg Subcutaneous BID    clopidogrel  75 mg Oral Daily    ipratropium-albuterol  1 ampule Inhalation Q4H WA     PRN Meds: hydrOXYzine, sodium chloride flush, potassium chloride **OR** potassium alternative oral replacement **OR** potassium chloride, magnesium sulfate, promethazine **OR** ondansetron, acetaminophen **OR** acetaminophen, melatonin, ipratropium-albuterol      Intake/Output Summary (Last 24 hours) at 2/27/2021 1430  Last data filed at 2/27/2021 1328  Gross per 24 hour   Intake 3590 ml   Output 2800 ml   Net 790 ml       Physical Exam Performed:    /69   Pulse 61   Temp 97.7 °F (36.5 °C) (Oral)   Resp 18   Ht 4' 11\" (1.499 m)   Wt 220 lb 10.9 oz (100.1 kg)   SpO2 (!) 89%   BMI 44.57 kg/m²     General appearance: No apparent distress, appears stated age and cooperative. HEENT: Pupils equal, round, and reactive to light. Conjunctivae/corneas clear. Neck: Supple, with full range of motion. No jugular venous distention. Trachea midline. Respiratory:  Normal respiratory effort. Clear to auscultation, bilaterally without Rales/Wheezes/Rhonchi.   Cardiovascular: Regular rate and rhythm with normal S1/S2 without murmurs, rubs or gallops. Abdomen: Soft, non-tender, non-distended with normal bowel sounds. Musculoskeletal: No clubbing, cyanosis or edema bilaterally. Full range of motion without deformity. Skin: Skin color, texture, turgor normal.  No rashes or lesions. Neurologic:  Neurovascularly intact without any focal sensory/motor deficits. Cranial nerves: II-XII intact, grossly non-focal.  Psychiatric: Alert and oriented, thought content appropriate, normal insight  Capillary Refill: Brisk,< 3 seconds   Peripheral Pulses: +2 palpable, equal bilaterally       Labs:   Recent Labs     02/25/21  0629 02/26/21  0609 02/27/21  0855   WBC 8.2 14.7* 9.3   HGB 13.9 13.7 13.1   HCT 42.4 42.1 39.3    277 252     Recent Labs     02/25/21  0629 02/26/21  0609 02/27/21  0855    136 140   K 4.1 4.7 4.3    102 104   CO2 28 26 27   BUN 20 16 11   CREATININE 0.8 0.7 0.7   CALCIUM 9.1 9.3 9.2     No results for input(s): AST, ALT, BILIDIR, BILITOT, ALKPHOS in the last 72 hours. No results for input(s): INR in the last 72 hours. No results for input(s): Neldon Docker in the last 72 hours. Urinalysis:      Lab Results   Component Value Date    NITRU Negative 02/25/2021    WBCUA 0-2 03/04/2017    RBCUA 0-2 03/04/2017    BLOODU Negative 02/25/2021    SPECGRAV 1.018 02/25/2021    GLUCOSEU Negative 02/25/2021       Radiology:  CT SOFT TISSUE NECK W CONTRAST   Final Result   1. The patient's known left palate lesion is not evident by CT. There is no   evident complication from recent biopsy. 2. No evidence of metastatic disease in the neck. 3. Mild atherosclerotic disease. CT CHEST W CONTRAST   Final Result   1. No evidence of metastatic disease in the chest.   2. Bandlike opacities in the bilateral lower lobes with associated airway   secretions. Findings are favored to represent atelectasis/infection. 3. Severe coronary artery atherosclerosis.          VL DUP CAROTID BILATERAL   Final Result      CT Head WO Contrast   Final Result   No acute intracranial process. Mild paranasal sinus disease. CTA HEAD NECK W CONTRAST   Final Result   Possible moderate or severe stenosis of the proximal left internal carotid   artery, which is obscured by motion artifact. No large vessel occlusion in the head or neck. XR CHEST PORTABLE   Final Result   No active cardiopulmonary disease                 Assessment/Plan:    Active Hospital Problems    Diagnosis    Atrial flutter ([address] [I48.92]    Light headed [R42]    Morbid obesity with BMI of 40.0-44.9, adult ([address] [E66.01, Z68.41]    Tobacco abuse [Z72.0]    Panlobular emphysema ([address] [J43.1]    Type 2 diabetes mellitus ([address] [E11.9]    Essential hypertension [I10]    Acquired hypothyroidism [E03.9]       Light-headedness  -  Patient was orthostatic. She could not tolerate even sitting up to check orthostatics. - this has resolved with holding her BP meds. - resumed lasix PO daily. - noted Coreg dose increased per cardiology - tolerating well        Left SADE, h/o CAD s/p PCI 2016. Hx if inferior wall MI. Hx of cardiac arrest.   -  Duplex carotid U/S shows <50% stenosis with moderate plaque formation bilateral.   -  Already taking plavix related to prior h/o CAD s/p PCI in 2016. She is supposed to also be taking statin but wasn't. Resumed statin. Afib RVR - self limited runs noted on telemetry. Get ECHO - reassuring with preserved EF and no major valvular abnormality reported. Cardiology involved. Will need AC, once ok with ENT post biopsy and surgery.          Oral lesion concerning for malignancy. - ENT involved. - biopsy 2/26 - path pending.   - plan for surgical removal with neck dissection. - CT chest - no signs of metastatic process. - noted ENT plans to discuss with HemOnc and RadOnc prior to final plan.        COPD stable, Tobaco abuse  -  Continue home inhaler regimen. Smoking cessation counseled. NRT provided.         DM2 borderline - A1C 5.9  -  monitor BG. DVT Prophylaxis: lovenox  Diet: DIET CARB CONTROL; Low Sodium (2 GM);  Daily Fluid Restriction: 2000 ml  Code Status: Full Code      Dispo - cc    Colby Thurston MD

## 2021-02-27 NOTE — PROGRESS NOTES
[facility]  Cardiology Consult Note        CC:     ? AFib           HPI:   This is a 72 y.o. female who came to the ER for severe lightheadedness and presyncope. We are asked to see her for new onset A. fib flutter last night at a rate of 150. Admission EKG shows sinus rhythm.   She is in sinus rhythm now appears to have self converted back to normal rhythm    She has a past medical history of CAD stenting hypertension diabetes obstructive sleep apnea and COPD      Interval history  No further palpitations or arrhythmias seen on telemetry        Past Medical History:   Diagnosis Date    Asthma     CAD (coronary artery disease)     CHF (congestive heart failure) (HCC)     COPD [facility] obstructive pulmonary disease) ([facility]     Depression     Diabetes mellitus ([facility]     Hyperlipidemia     Hypertension     Hypothyroidism     Morbid obesity with BMI of 40.0-44.9, adult ([facility] 2019    Sleep apnea     Substance abuse ([facility]     Thyroid disease     Type 2 diabetes mellitus without complication ([facility]       Past Surgical History:   Procedure Laterality Date    ADENOIDECTOMY      CARDIAC SURGERY       SECTION      x3    CORONARY ANGIOPLASTY WITH STENT PLACEMENT      EYE SURGERY      HERNIA REPAIR      HYSTERECTOMY      LARYNGOSCOPY N/A 2021    DIRECT LARYNGOSCOPY performed by Mick Burkitt, MD at [facility] 5265 N/A 2021    ORAL PHARYNGEAL BIOPSY performed by Mick Burkitt, MD at [facility] 96 N/A 2021    NASAL ENDOSCOPY performed by Mick Burkitt, MD at [facility] 79.        Family History   Problem Relation Age of Onset    Heart Disease Mother     High Blood Pressure Mother     Diabetes Mother     Heart Disease Father     High Blood Pressure Father     Diabetes Father       Social History     Tobacco Use    Smoking status: Former Smoker     Packs/day: 1.25     Years: 45.00     Pack Conjunctiva: Pink; non icteric,  Mucous Memb:  Moist, No thyromegaly or Lymphadenopathy  Respiratory:  Resp Assessment: normal, Resp Auscultation: clear   Cardiovascular: Auscultation: nl S1 & S2, Palpation:  Nl PMI;  No heaves or thrills, JVP:  normal  Abdomen: Soft, non-tender, Normal bowel sounds,  No organomegaly  Extremities: No Cyanosis or Clubbing; Edema none  Neurological: Oriented to time, place, and person, Non-anxious  Psychiatric: Normal mood and affect  Skin: Warm and dry,  No rash seen      Current Facility-Administered Medications: amiodarone (CORDARONE) tablet 200 mg, 200 mg, Oral, BID  carvedilol (COREG) tablet 25 mg, 25 mg, Oral, BID WC  [Held by provider] furosemide (LASIX) tablet 40 mg, 40 mg, Oral, Daily  citalopram (CELEXA) tablet 20 mg, 20 mg, Oral, Daily  famotidine (PEPCID) tablet 20 mg, 20 mg, Oral, BID  levothyroxine (SYNTHROID) tablet 50 mcg, 50 mcg, Oral, Daily  budesonide-formoterol (SYMBICORT) 80-4.5 MCG/ACT inhaler 2 puff, 2 puff, Inhalation, BID  tiotropium (SPIRIVA RESPIMAT) 2.5 MCG/ACT inhaler 2 puff, 2 puff, Inhalation, Daily  sodium chloride flush 0.9 % injection 10 mL, 10 mL, Intravenous, PRN  potassium chloride (KLOR-CON M) extended release tablet 40 mEq, 40 mEq, Oral, PRN **OR** potassium bicarb-citric acid (EFFER-K) effervescent tablet 40 mEq, 40 mEq, Oral, PRN **OR** potassium chloride 10 mEq/100 mL IVPB (Peripheral Line), 10 mEq, Intravenous, PRN  magnesium sulfate 1000 mg in dextrose 5% 100 mL IVPB, 1,000 mg, Intravenous, PRN  promethazine (PHENERGAN) tablet 12.5 mg, 12.5 mg, Oral, Q6H PRN **OR** ondansetron (ZOFRAN) injection 4 mg, 4 mg, Intravenous, Q6H PRN  acetaminophen (TYLENOL) tablet 650 mg, 650 mg, Oral, Q6H PRN **OR** acetaminophen (TYLENOL) suppository 650 mg, 650 mg, Rectal, Q6H PRN  nicotine (NICODERM CQ) 14 MG/24HR 1 patch, 1 patch, Transdermal, Daily  enoxaparin (LOVENOX) injection 30 mg, 30 mg, Subcutaneous, BID  melatonin tablet 3 mg, 3 mg, Oral, Nightly PRN  ipratropium-albuterol (DUONEB) nebulizer solution 1 ampule, 1 ampule, Inhalation, 4x Daily PRN  clopidogrel (PLAVIX) tablet 75 mg, 75 mg, Oral, Daily  ipratropium-albuterol (DUONEB) nebulizer solution 1 ampule, 1 ampule, Inhalation, Q4H WA      Labs:   Recent Labs     02/26/21  0609 02/27/21  0855   WBC 14.7* 9.3   HGB 13.7 13.1   HCT 42.1 39.3    252     Recent Labs     02/25/21  0629 02/26/21  0609    136   K 4.1 4.7   CO2 28 26   BUN 20 16   CREATININE 0.8 0.7   GLUCOSE 88 114*     No results for input(s): BNP in the last 72 hours. No results for input(s): PROTIME, INR in the last 72 hours. No results for input(s): APTT in the last 72 hours. No results for input(s): CKTOTAL, CKMB, CKMBINDEX, TROPONINI in the last 72 hours. Lab Results   Component Value Date    HDL 61 10/01/2019    LDLCALC 170 10/01/2019    TRIG 138 10/01/2019     No results for input(s): AST, ALT, LABALBU in the last 72 hours. EKG:   Sinus rhythm  Telemetry shows regular tachycardia rate of 150    ECHO:9/2019  Concentric LVH with normal LV size and wall motion. EF is  60%. The left atrium is normal in size. Normal right ventricular size and function. Trivial mitral regurgitation is present. Stress Nuclear:2019  Normal myocardial perfusion study. Normal myocardial perfusion. Normal LV size and systolic function. Overall findings represent a low risk study. [facility] angiogram  & Intervention: 6/2016 [facility]):  80% LAD, 50% ramus and occluded RCA  She underwent ANNALEE to RCA and in separate procedure stent to LAD. ASSESSMENT AND PLAN:      A flutter  Review of the rhythm strip shows that the patient likely has a flutter with intermittent A.  Fib  On amiodarone 200 mg twice a day  Sinus rhythm maintained  Will need anticoagulation for high CHADS2 score once ENT work is finished  Follow-up with me in 2 to 3 weeks      Hypertension  Increase Coreg to 25 twice daily    History of CAD  Status post stent to the RCA and LAD in 2016      Patient going for oropharyngeal biopsy          Cameron Morton M.D  2/27/2021

## 2021-02-27 NOTE — PROGRESS NOTES
Pt requested assistance from social work with setting up medication fill at her pharmacy. Note left on summary page of EMR to set up consult.  Electronically signed by Bill Schultz on 2/26/2021 at 8:15 PM

## 2021-02-28 LAB
ANION GAP SERPL CALCULATED.3IONS-SCNC: 8 MMOL/L (ref 3–16)
BASOPHILS ABSOLUTE: 0 K/UL (ref 0–0.2)
BASOPHILS RELATIVE PERCENT: 0.5 %
BUN BLDV-MCNC: 13 MG/DL (ref 7–20)
CALCIUM SERPL-MCNC: 9.1 MG/DL (ref 8.3–10.6)
CHLORIDE BLD-SCNC: 105 MMOL/L (ref 99–110)
CO2: 30 MMOL/L (ref 21–32)
CREAT SERPL-MCNC: 0.6 MG/DL (ref 0.6–1.2)
EOSINOPHILS ABSOLUTE: 0.2 K/UL (ref 0–0.6)
EOSINOPHILS RELATIVE PERCENT: 2.5 %
GFR AFRICAN AMERICAN: >60
GFR NON-AFRICAN AMERICAN: >60
GLUCOSE BLD-MCNC: 88 MG/DL (ref 70–99)
HCT VFR BLD CALC: 37.7 % (ref 36–48)
HEMOGLOBIN: 12.5 G/DL (ref 12–16)
LYMPHOCYTES ABSOLUTE: 2.8 K/UL (ref 1–5.1)
LYMPHOCYTES RELATIVE PERCENT: 31.1 %
MAGNESIUM: 1.9 MG/DL (ref 1.8–2.4)
MCH RBC QN AUTO: 28.3 PG (ref 26–34)
MCHC RBC AUTO-ENTMCNC: 33 G/DL (ref 31–36)
MCV RBC AUTO: 85.7 FL (ref 80–100)
MONOCYTES ABSOLUTE: 0.9 K/UL (ref 0–1.3)
MONOCYTES RELATIVE PERCENT: 9.6 %
NEUTROPHILS ABSOLUTE: 5.1 K/UL (ref 1.7–7.7)
NEUTROPHILS RELATIVE PERCENT: 56.3 %
PDW BLD-RTO: 13.7 % (ref 12.4–15.4)
PLATELET # BLD: 253 K/UL (ref 135–450)
PMV BLD AUTO: 8.9 FL (ref 5–10.5)
POTASSIUM SERPL-SCNC: 4 MMOL/L (ref 3.5–5.1)
RBC # BLD: 4.4 M/UL (ref 4–5.2)
SODIUM BLD-SCNC: 143 MMOL/L (ref 136–145)
WBC # BLD: 9 K/UL (ref 4–11)

## 2021-02-28 PROCEDURE — 85025 COMPLETE CBC W/AUTO DIFF WBC: CPT

## 2021-02-28 PROCEDURE — 83735 ASSAY OF MAGNESIUM: CPT

## 2021-02-28 PROCEDURE — 2580000003 HC RX 258: Performed by: INTERNAL MEDICINE

## 2021-02-28 PROCEDURE — 6370000000 HC RX 637 (ALT 250 FOR IP): Performed by: INTERNAL MEDICINE

## 2021-02-28 PROCEDURE — 1200000000 HC SEMI PRIVATE

## 2021-02-28 PROCEDURE — 94760 N-INVAS EAR/PLS OXIMETRY 1: CPT

## 2021-02-28 PROCEDURE — 6370000000 HC RX 637 (ALT 250 FOR IP): Performed by: NURSE PRACTITIONER

## 2021-02-28 PROCEDURE — 94640 AIRWAY INHALATION TREATMENT: CPT

## 2021-02-28 PROCEDURE — 94761 N-INVAS EAR/PLS OXIMETRY MLT: CPT

## 2021-02-28 PROCEDURE — 36415 COLL VENOUS BLD VENIPUNCTURE: CPT

## 2021-02-28 PROCEDURE — 80048 BASIC METABOLIC PNL TOTAL CA: CPT

## 2021-02-28 RX ADMIN — PHENOL 1 SPRAY: 1.4 SPRAY ORAL at 20:45

## 2021-02-28 RX ADMIN — CARVEDILOL 25 MG: 25 TABLET, FILM COATED ORAL at 17:34

## 2021-02-28 RX ADMIN — LEVOTHYROXINE SODIUM 50 MCG: 0.05 TABLET ORAL at 06:28

## 2021-02-28 RX ADMIN — BUDESONIDE AND FORMOTEROL FUMARATE DIHYDRATE 2 PUFF: 80; 4.5 AEROSOL RESPIRATORY (INHALATION) at 19:45

## 2021-02-28 RX ADMIN — TIOTROPIUM BROMIDE INHALATION SPRAY 2 PUFF: 3.12 SPRAY, METERED RESPIRATORY (INHALATION) at 12:35

## 2021-02-28 RX ADMIN — CLOPIDOGREL BISULFATE 75 MG: 75 TABLET ORAL at 08:05

## 2021-02-28 RX ADMIN — AMIODARONE HYDROCHLORIDE 200 MG: 200 TABLET ORAL at 08:05

## 2021-02-28 RX ADMIN — AMIODARONE HYDROCHLORIDE 200 MG: 200 TABLET ORAL at 20:43

## 2021-02-28 RX ADMIN — IPRATROPIUM BROMIDE AND ALBUTEROL SULFATE 1 AMPULE: .5; 3 SOLUTION RESPIRATORY (INHALATION) at 16:26

## 2021-02-28 RX ADMIN — IPRATROPIUM BROMIDE AND ALBUTEROL SULFATE 1 AMPULE: .5; 3 SOLUTION RESPIRATORY (INHALATION) at 12:35

## 2021-02-28 RX ADMIN — IPRATROPIUM BROMIDE AND ALBUTEROL SULFATE 1 AMPULE: .5; 3 SOLUTION RESPIRATORY (INHALATION) at 09:08

## 2021-02-28 RX ADMIN — TRAZODONE HYDROCHLORIDE 50 MG: 50 TABLET ORAL at 20:43

## 2021-02-28 RX ADMIN — CARVEDILOL 25 MG: 25 TABLET, FILM COATED ORAL at 08:05

## 2021-02-28 RX ADMIN — IPRATROPIUM BROMIDE AND ALBUTEROL SULFATE 1 AMPULE: .5; 3 SOLUTION RESPIRATORY (INHALATION) at 19:45

## 2021-02-28 RX ADMIN — FAMOTIDINE 20 MG: 20 TABLET ORAL at 20:43

## 2021-02-28 RX ADMIN — Medication 10 ML: at 20:44

## 2021-02-28 RX ADMIN — FAMOTIDINE 20 MG: 20 TABLET ORAL at 08:05

## 2021-02-28 RX ADMIN — BUDESONIDE AND FORMOTEROL FUMARATE DIHYDRATE 2 PUFF: 80; 4.5 AEROSOL RESPIRATORY (INHALATION) at 09:08

## 2021-02-28 RX ADMIN — FUROSEMIDE 40 MG: 40 TABLET ORAL at 08:05

## 2021-02-28 NOTE — PROGRESS NOTES
Hospitalist Progress Note      PCP: TERESA Fierro - NP    Date of Admission: 2/22/2021    Chief Complaint: lightheaded. Subjective:     Lightheadedness resolved. Feels better. S/p Biopsy of oral lesion 2/25 with ENT - path pending. Afib much better control - she is tolerating coreg and amio well. Medications:  Reviewed    Infusion Medications     Scheduled Medications    traZODone  50 mg Oral Nightly    amiodarone  200 mg Oral BID    carvedilol  25 mg Oral BID WC    furosemide  40 mg Oral Daily    famotidine  20 mg Oral BID    levothyroxine  50 mcg Oral Daily    budesonide-formoterol  2 puff Inhalation BID    tiotropium  2 puff Inhalation Daily    nicotine  1 patch Transdermal Daily    enoxaparin  30 mg Subcutaneous BID    clopidogrel  75 mg Oral Daily    ipratropium-albuterol  1 ampule Inhalation Q4H WA     PRN Meds: hydrOXYzine, sodium chloride flush, potassium chloride **OR** potassium alternative oral replacement **OR** potassium chloride, magnesium sulfate, promethazine **OR** ondansetron, acetaminophen **OR** acetaminophen, melatonin, ipratropium-albuterol      Intake/Output Summary (Last 24 hours) at 2/28/2021 1408  Last data filed at 2/28/2021 1255  Gross per 24 hour   Intake 600 ml   Output --   Net 600 ml       Physical Exam Performed:    BP (!) 102/57   Pulse 76   Temp 97.2 °F (36.2 °C) (Oral)   Resp 18   Ht 4' 11\" (1.499 m)   Wt 231 lb 4.2 oz (104.9 kg)   SpO2 96%   BMI 46.71 kg/m²     General appearance: No apparent distress, appears stated age and cooperative. HEENT: Pupils equal, round, and reactive to light. Conjunctivae/corneas clear. Neck: Supple, with full range of motion. No jugular venous distention. Trachea midline. Respiratory:  Normal respiratory effort. Clear to auscultation, bilaterally without Rales/Wheezes/Rhonchi. Cardiovascular: Regular rate and rhythm with normal S1/S2 without murmurs, rubs or gallops.   Abdomen: Soft, non-tender, non-distended with normal bowel sounds. Musculoskeletal: No clubbing, cyanosis or edema bilaterally. Full range of motion without deformity. Skin: Skin color, texture, turgor normal.  No rashes or lesions. Neurologic:  Neurovascularly intact without any focal sensory/motor deficits. Cranial nerves: II-XII intact, grossly non-focal.  Psychiatric: Alert and oriented, thought content appropriate, normal insight  Capillary Refill: Brisk,< 3 seconds   Peripheral Pulses: +2 palpable, equal bilaterally       Labs:   Recent Labs     02/26/21  0609 02/27/21  0855 02/28/21  0645   WBC 14.7* 9.3 9.0   HGB 13.7 13.1 12.5   HCT 42.1 39.3 37.7    252 253     Recent Labs     02/26/21  0609 02/27/21  0855 02/28/21  0645    140 143   K 4.7 4.3 4.0    104 105   CO2 26 27 30   BUN 16 11 13   CREATININE 0.7 0.7 0.6   CALCIUM 9.3 9.2 9.1     No results for input(s): AST, ALT, BILIDIR, BILITOT, ALKPHOS in the last 72 hours. No results for input(s): INR in the last 72 hours. No results for input(s): Onetha Viktoria in the last 72 hours. Urinalysis:      Lab Results   Component Value Date    NITRU Negative 02/25/2021    WBCUA 0-2 03/04/2017    RBCUA 0-2 03/04/2017    BLOODU Negative 02/25/2021    SPECGRAV 1.018 02/25/2021    GLUCOSEU Negative 02/25/2021       Radiology:  CT SOFT TISSUE NECK W CONTRAST   Final Result   1. The patient's known left palate lesion is not evident by CT. There is no   evident complication from recent biopsy. 2. No evidence of metastatic disease in the neck. 3. Mild atherosclerotic disease. CT CHEST W CONTRAST   Final Result   1. No evidence of metastatic disease in the chest.   2. Bandlike opacities in the bilateral lower lobes with associated airway   secretions. Findings are favored to represent atelectasis/infection. 3. Severe coronary artery atherosclerosis.          VL DUP CAROTID BILATERAL   Final Result      CT Head WO Contrast   Final Result No acute intracranial process. Mild paranasal sinus disease. CTA HEAD NECK W CONTRAST   Final Result   Possible moderate or severe stenosis of the proximal left internal carotid   artery, which is obscured by motion artifact. No large vessel occlusion in the head or neck. XR CHEST PORTABLE   Final Result   No active cardiopulmonary disease                 Assessment/Plan:    Active Hospital Problems    Diagnosis    Atrial flutter ([facility] [I48.92]    Light headed [R42]    Morbid obesity with BMI of 40.0-44.9, adult ([facility] [E66.01, Z68.41]    Tobacco abuse [Z72.0]    Panlobular emphysema ([facility] [J43.1]    Type 2 diabetes mellitus ([facility] [E11.9]    Essential hypertension [I10]    Acquired hypothyroidism [E03.9]       Light-headedness  -  Patient was orthostatic. She could not tolerate even sitting up to check orthostatics. - this has resolved with holding her BP meds. - resumed lasix PO daily. - noted Coreg dose increased per cardiology - tolerating well        Left SADE, h/o CAD s/p PCI 2016. Hx if inferior wall MI. Hx of cardiac arrest.   -  Duplex carotid U/S shows <50% stenosis with moderate plaque formation bilateral.   -  Already taking plavix related to prior h/o CAD s/p PCI in 2016. She is supposed to also be taking statin but wasn't. Resumed statin. Afib RVR - self limited runs noted on telemetry. Get ECHO - reassuring with preserved EF and no major valvular abnormality reported. Cardiology involved. Will need AC, once ok with ENT post biopsy and surgery.          Oral lesion concerning for malignancy. - ENT involved. - biopsy 2/26 - path pending.   - plan for surgical removal with neck dissection. - CT chest - no signs of metastatic process. - noted ENT plans to discuss with HemOnc and RadOnc prior to final plan.        COPD stable, Tobaco abuse  -  Continue home inhaler regimen. Smoking cessation counseled.  NRT provided.         DM2 borderline - A1C 5.9  -  monitor BG. DVT Prophylaxis: lovenox  Diet: DIET CARB CONTROL; Low Sodium (2 GM); Daily Fluid Restriction: 2000 ml  Code Status: Full Code      Dispo - cc. Pending further ENT recs and final biopsy results.      Colby Thurston MD

## 2021-02-28 NOTE — PLAN OF CARE
Problem: Falls - Risk of:  Goal: Will remain free from falls  Description: Will remain free from falls  2/28/2021 0836 by Vivien Greenberg RN  Outcome: Ongoing  2/28/2021 0115 by Edward Gutierrez RN  Outcome: Ongoing  Goal: Absence of physical injury  Description: Absence of physical injury  2/28/2021 0836 by Vivien Greenberg RN  Outcome: Ongoing  2/28/2021 0115 by Edward Gutierrez RN  Outcome: Ongoing

## 2021-03-01 LAB
ANION GAP SERPL CALCULATED.3IONS-SCNC: 7 MMOL/L (ref 3–16)
BASOPHILS ABSOLUTE: 0 K/UL (ref 0–0.2)
BASOPHILS RELATIVE PERCENT: 0.5 %
BUN BLDV-MCNC: 16 MG/DL (ref 7–20)
CALCIUM SERPL-MCNC: 9.4 MG/DL (ref 8.3–10.6)
CHLORIDE BLD-SCNC: 103 MMOL/L (ref 99–110)
CO2: 28 MMOL/L (ref 21–32)
CREAT SERPL-MCNC: 0.7 MG/DL (ref 0.6–1.2)
EOSINOPHILS ABSOLUTE: 0.2 K/UL (ref 0–0.6)
EOSINOPHILS RELATIVE PERCENT: 2.7 %
GFR AFRICAN AMERICAN: >60
GFR NON-AFRICAN AMERICAN: >60
GLUCOSE BLD-MCNC: 93 MG/DL (ref 70–99)
HCT VFR BLD CALC: 40.8 % (ref 36–48)
HEMOGLOBIN: 13.7 G/DL (ref 12–16)
LYMPHOCYTES ABSOLUTE: 2.2 K/UL (ref 1–5.1)
LYMPHOCYTES RELATIVE PERCENT: 27.6 %
MAGNESIUM: 1.8 MG/DL (ref 1.8–2.4)
MCH RBC QN AUTO: 28.8 PG (ref 26–34)
MCHC RBC AUTO-ENTMCNC: 33.7 G/DL (ref 31–36)
MCV RBC AUTO: 85.4 FL (ref 80–100)
MONOCYTES ABSOLUTE: 0.6 K/UL (ref 0–1.3)
MONOCYTES RELATIVE PERCENT: 7.7 %
NEUTROPHILS ABSOLUTE: 5 K/UL (ref 1.7–7.7)
NEUTROPHILS RELATIVE PERCENT: 61.5 %
PDW BLD-RTO: 13.7 % (ref 12.4–15.4)
PLATELET # BLD: 260 K/UL (ref 135–450)
PMV BLD AUTO: 8.9 FL (ref 5–10.5)
POTASSIUM SERPL-SCNC: 4.1 MMOL/L (ref 3.5–5.1)
RBC # BLD: 4.77 M/UL (ref 4–5.2)
SODIUM BLD-SCNC: 138 MMOL/L (ref 136–145)
WBC # BLD: 8.1 K/UL (ref 4–11)

## 2021-03-01 PROCEDURE — 36415 COLL VENOUS BLD VENIPUNCTURE: CPT

## 2021-03-01 PROCEDURE — 6370000000 HC RX 637 (ALT 250 FOR IP): Performed by: INTERNAL MEDICINE

## 2021-03-01 PROCEDURE — 83735 ASSAY OF MAGNESIUM: CPT

## 2021-03-01 PROCEDURE — 6370000000 HC RX 637 (ALT 250 FOR IP): Performed by: NURSE PRACTITIONER

## 2021-03-01 PROCEDURE — 80048 BASIC METABOLIC PNL TOTAL CA: CPT

## 2021-03-01 PROCEDURE — 2580000003 HC RX 258: Performed by: INTERNAL MEDICINE

## 2021-03-01 PROCEDURE — 94640 AIRWAY INHALATION TREATMENT: CPT

## 2021-03-01 PROCEDURE — 85025 COMPLETE CBC W/AUTO DIFF WBC: CPT

## 2021-03-01 PROCEDURE — 1200000000 HC SEMI PRIVATE

## 2021-03-01 PROCEDURE — 94761 N-INVAS EAR/PLS OXIMETRY MLT: CPT

## 2021-03-01 RX ORDER — CARVEDILOL 12.5 MG/1
12.5 TABLET ORAL 2 TIMES DAILY WITH MEALS
Status: DISCONTINUED | OUTPATIENT
Start: 2021-03-01 | End: 2021-03-02 | Stop reason: HOSPADM

## 2021-03-01 RX ADMIN — IPRATROPIUM BROMIDE AND ALBUTEROL SULFATE 1 AMPULE: .5; 3 SOLUTION RESPIRATORY (INHALATION) at 20:31

## 2021-03-01 RX ADMIN — BUDESONIDE AND FORMOTEROL FUMARATE DIHYDRATE 2 PUFF: 80; 4.5 AEROSOL RESPIRATORY (INHALATION) at 20:31

## 2021-03-01 RX ADMIN — TIOTROPIUM BROMIDE INHALATION SPRAY 2 PUFF: 3.12 SPRAY, METERED RESPIRATORY (INHALATION) at 08:47

## 2021-03-01 RX ADMIN — APIXABAN 5 MG: 5 TABLET, FILM COATED ORAL at 20:57

## 2021-03-01 RX ADMIN — FAMOTIDINE 20 MG: 20 TABLET ORAL at 20:57

## 2021-03-01 RX ADMIN — CARVEDILOL 12.5 MG: 12.5 TABLET, FILM COATED ORAL at 17:28

## 2021-03-01 RX ADMIN — BUDESONIDE AND FORMOTEROL FUMARATE DIHYDRATE 2 PUFF: 80; 4.5 AEROSOL RESPIRATORY (INHALATION) at 08:47

## 2021-03-01 RX ADMIN — IPRATROPIUM BROMIDE AND ALBUTEROL SULFATE 1 AMPULE: .5; 3 SOLUTION RESPIRATORY (INHALATION) at 11:50

## 2021-03-01 RX ADMIN — LEVOTHYROXINE SODIUM 50 MCG: 0.05 TABLET ORAL at 06:14

## 2021-03-01 RX ADMIN — TRAZODONE HYDROCHLORIDE 50 MG: 50 TABLET ORAL at 20:57

## 2021-03-01 RX ADMIN — AMIODARONE HYDROCHLORIDE 200 MG: 200 TABLET ORAL at 20:57

## 2021-03-01 RX ADMIN — FUROSEMIDE 40 MG: 40 TABLET ORAL at 10:25

## 2021-03-01 RX ADMIN — CARVEDILOL 25 MG: 25 TABLET, FILM COATED ORAL at 10:25

## 2021-03-01 RX ADMIN — IPRATROPIUM BROMIDE AND ALBUTEROL SULFATE 1 AMPULE: .5; 3 SOLUTION RESPIRATORY (INHALATION) at 15:39

## 2021-03-01 RX ADMIN — IPRATROPIUM BROMIDE AND ALBUTEROL SULFATE 1 AMPULE: .5; 3 SOLUTION RESPIRATORY (INHALATION) at 08:47

## 2021-03-01 RX ADMIN — Medication 10 ML: at 20:57

## 2021-03-01 RX ADMIN — FAMOTIDINE 20 MG: 20 TABLET ORAL at 10:25

## 2021-03-01 RX ADMIN — AMIODARONE HYDROCHLORIDE 200 MG: 200 TABLET ORAL at 10:25

## 2021-03-01 ASSESSMENT — PAIN SCALES - GENERAL
PAINLEVEL_OUTOF10: 0
PAINLEVEL_OUTOF10: 0

## 2021-03-01 NOTE — PLAN OF CARE
Problem: Falls - Risk of:  Goal: Will remain free from falls  Description: Will remain free from falls  Outcome: Ongoing  Goal: Absence of physical injury  Description: Absence of physical injury  Outcome: Ongoing     Problem: OXYGENATION/RESPIRATORY FUNCTION  Goal: Patient will maintain patent airway  Outcome: Ongoing  Goal: Patient will achieve/maintain normal respiratory rate/effort  Description: Respiratory rate and effort will be within normal limits for the patient  Outcome: Ongoing     Problem: ACTIVITY INTOLERANCE/IMPAIRED MOBILITY  Goal: Mobility/activity is maintained at optimum level for patient  Outcome: Ongoing

## 2021-03-01 NOTE — PROGRESS NOTES
Pt alert and oriented x 4. Night medication given and pt tolerated well. Loss IV access and pt states she want to get rest her arm for tonight. Will get new IV access tomorrow morning. No further needs at this time. Will continue to monitor.

## 2021-03-01 NOTE — PROGRESS NOTES
Physical Therapy Attempt/Discharge    Attempted to see patient for therapy. Pt reports she is independent and does not need therapy. \"I made my bed today and have been getting up without any help. \" Educated on role of physical therapy. Pt declines need for continued therapy services at this time. Will discharge from PT caseload.   Yossi Jimenez,   [facility]

## 2021-03-01 NOTE — PROGRESS NOTES
Hospitalist Progress Note      PCP: Odalys Ivan APRN - NP    Date of Admission: 2/22/2021    Chief Complaint: lightheaded. Subjective:     Lightheadedness resolved. Feels better. S/p Biopsy of oral lesion 2/25 with ENT - path with mod differentiated squamous cell Ca. Medications:  Reviewed    Infusion Medications     Scheduled Medications    carvedilol  12.5 mg Oral BID WC    apixaban  5 mg Oral BID    traZODone  50 mg Oral Nightly    amiodarone  200 mg Oral BID    furosemide  40 mg Oral Daily    famotidine  20 mg Oral BID    levothyroxine  50 mcg Oral Daily    budesonide-formoterol  2 puff Inhalation BID    tiotropium  2 puff Inhalation Daily    nicotine  1 patch Transdermal Daily    [Held by provider] clopidogrel  75 mg Oral Daily    ipratropium-albuterol  1 ampule Inhalation Q4H WA     PRN Meds: phenol, hydrOXYzine, sodium chloride flush, potassium chloride **OR** potassium alternative oral replacement **OR** potassium chloride, magnesium sulfate, promethazine **OR** ondansetron, acetaminophen **OR** acetaminophen, melatonin, ipratropium-albuterol      Intake/Output Summary (Last 24 hours) at 3/1/2021 1511  Last data filed at 3/1/2021 0829  Gross per 24 hour   Intake 600 ml   Output 1000 ml   Net -400 ml       Physical Exam Performed:    BP (!) 94/56 Comment: MD made aware via msg  Pulse 71   Temp 98.5 °F (36.9 °C) (Oral)   Resp 16   Ht 4' 11\" (1.499 m)   Wt 231 lb 4.2 oz (104.9 kg)   SpO2 92%   BMI 46.71 kg/m²     General appearance: No apparent distress, appears stated age and cooperative. HEENT: Pupils equal, round, and reactive to light. Conjunctivae/corneas clear. Neck: Supple, with full range of motion. No jugular venous distention. Trachea midline. Respiratory:  Normal respiratory effort. Clear to auscultation, bilaterally without Rales/Wheezes/Rhonchi.   Cardiovascular: Regular rate and rhythm with normal S1/S2 without murmurs, rubs or Contrast   Final Result   No acute intracranial process. Mild paranasal sinus disease. CTA HEAD NECK W CONTRAST   Final Result   Possible moderate or severe stenosis of the proximal left internal carotid   artery, which is obscured by motion artifact. No large vessel occlusion in the head or neck. XR CHEST PORTABLE   Final Result   No active cardiopulmonary disease                 Assessment/Plan:    Active Hospital Problems    Diagnosis    Atrial flutter ([address] [I48.92]    Light headed [R42]    Morbid obesity with BMI of 40.0-44.9, adult ([address] [E66.01, Z68.41]    Tobacco abuse [Z72.0]    Panlobular emphysema ([address] [J43.1]    Type 2 diabetes mellitus ([facility] [E11.9]    Essential hypertension [I10]    Acquired hypothyroidism [E03.9]       Light-headedness  -  Patient was orthostatic. She could not tolerate even sitting up to check orthostatics. - this has resolved with holding her BP meds. - resumed lasix PO daily. - noted Coreg dose increased per cardiology - tolerating well        Left SADE, h/o CAD s/p PCI 2016. Hx if inferior wall MI. Hx of cardiac arrest.   -  Duplex carotid U/S shows <50% stenosis with moderate plaque formation bilateral.   -  Already taking plavix related to prior h/o CAD s/p PCI in 2016. She is supposed to also be taking statin but wasn't. Resumed statin. Afib RVR - self limited runs noted on telemetry. Get ECHO - reassuring with preserved EF and no major valvular abnormality reported. Cardiology involved. Will start on eliquis today.          Squamous Cell Carcinoma of Soft Palate. Left soft palate oral lesion concerning for malignancy. - ENT involved. - biopsy 2/26 - path with mod differentiated squamous cell Ca. - plan for surgical removal with neck dissection - surgery now scheduled for March 17th.    - CT chest - no signs of metastatic process.     - ENT requested cardiology and pulm clearance prior to surgery - will attempt to get these done before discharge.        COPD stable, Tobaco abuse  -  Continue home inhaler regimen. Smoking cessation counseled. NRT provided.         DM2 borderline - A1C 5.9  -  monitor BG. DVT Prophylaxis: lovenox  Diet: DIET CARB CONTROL; Low Sodium (2 GM); Daily Fluid Restriction: 2000 ml  Code Status: Full Code      Dispo - cc. Discharge tomorrow am - if able will complete pulm and cardiology clearance for surgery as requested by ENT.      Oskar Pearson MD

## 2021-03-01 NOTE — PROGRESS NOTES
This RN placed call to Dr. Aureliano Lewis to advise clearance prior to discharge for ENT surgery tentatively scheduled for March 17, Cardiology nurse Hortencia Huber took message and states \"He is out of office for today and will be able to see patient tomorrow. This RN updated Dr. Maya Carmona via secure message.

## 2021-03-01 NOTE — PLAN OF CARE
Problem: Falls - Risk of:  Goal: Will remain free from falls  Description: Will remain free from falls  3/1/2021 1517 by Nahun Harrell RN  Outcome: Met This Shift     Problem: OXYGENATION/RESPIRATORY FUNCTION  Goal: Patient will achieve/maintain normal respiratory rate/effort  Description: Respiratory rate and effort will be within normal limits for the patient  3/1/2021 1517 by Nahun Harrell RN  Outcome: Met This Shift     Problem: HEMODYNAMIC STATUS  Goal: Patient has stable vital signs and fluid balance  3/1/2021 1517 by Nahun Harrell RN  Outcome: Ongoing     Problem: FLUID AND ELECTROLYTE IMBALANCE  Goal: Fluid and electrolyte balance are achieved/maintained  3/1/2021 1517 by Nahun Harrell RN  Outcome: Met This Shift     Problem: ACTIVITY INTOLERANCE/IMPAIRED MOBILITY  Goal: Mobility/activity is maintained at optimum level for patient  3/1/2021 1517 by Nahun Harrell RN  Outcome: Ongoing

## 2021-03-01 NOTE — PROGRESS NOTES
Patient had no significant change throughout the weekend. I discussed her diagnosis with the radiation oncologist as well as oncology. We have also discussed this with the patient. Patient is ready to proceed with recommended therapy. Exam  Stable left posterior palate lesion    Patient had a CT of the neck as well as chest.  You can see a couple lymph nodes in the bilateral jugulodigastric area which do not appear pathologic. She had no evidence of metastatic disease to the lungs    Pathology showed p16 negative squamous cell carcinoma    Plan  I feel like the work-up is complete for cancer. After discussing with other physicians, I feel as though she would likely tolerate an upfront surgery best.  The surgery would include resection of part of her palate and a left selective neck dissection. She understands the risk of this would include fairly severe postoperative pain, bleeding and recurrence. She understands that the neck dissection risk would include bleeding, infection and damage to spinal accessory nerve. She understands that if everything goes very well with surgery which would include negative margins and no concerning features on the lymph nodes, she may avoid radiation altogether. I cannot tell her for sure prior to getting the pathology back whether or not radiation would be recommended after the surgery. I have tentatively scheduled her for the afternoon of March 17. She can be discharged home from my standpoint and return preoperatively. I would like for her to be cleared by internal medicine and if needed cardiology and pulmonology.

## 2021-03-02 VITALS
RESPIRATION RATE: 18 BRPM | HEIGHT: 59 IN | WEIGHT: 217.37 LBS | SYSTOLIC BLOOD PRESSURE: 95 MMHG | DIASTOLIC BLOOD PRESSURE: 72 MMHG | OXYGEN SATURATION: 92 % | BODY MASS INDEX: 43.82 KG/M2 | TEMPERATURE: 98.1 F | HEART RATE: 80 BPM

## 2021-03-02 LAB
ANION GAP SERPL CALCULATED.3IONS-SCNC: 11 MMOL/L (ref 3–16)
BASOPHILS ABSOLUTE: 0.1 K/UL (ref 0–0.2)
BASOPHILS RELATIVE PERCENT: 0.9 %
BUN BLDV-MCNC: 22 MG/DL (ref 7–20)
CALCIUM SERPL-MCNC: 9.4 MG/DL (ref 8.3–10.6)
CHLORIDE BLD-SCNC: 101 MMOL/L (ref 99–110)
CO2: 28 MMOL/L (ref 21–32)
CREAT SERPL-MCNC: 0.8 MG/DL (ref 0.6–1.2)
EOSINOPHILS ABSOLUTE: 0.2 K/UL (ref 0–0.6)
EOSINOPHILS RELATIVE PERCENT: 2 %
GFR AFRICAN AMERICAN: >60
GFR NON-AFRICAN AMERICAN: >60
GLUCOSE BLD-MCNC: 99 MG/DL (ref 70–99)
HCT VFR BLD CALC: 41.5 % (ref 36–48)
HEMOGLOBIN: 14 G/DL (ref 12–16)
LYMPHOCYTES ABSOLUTE: 2.5 K/UL (ref 1–5.1)
LYMPHOCYTES RELATIVE PERCENT: 22.9 %
MAGNESIUM: 1.8 MG/DL (ref 1.8–2.4)
MCH RBC QN AUTO: 28.7 PG (ref 26–34)
MCHC RBC AUTO-ENTMCNC: 33.6 G/DL (ref 31–36)
MCV RBC AUTO: 85.5 FL (ref 80–100)
MONOCYTES ABSOLUTE: 0.9 K/UL (ref 0–1.3)
MONOCYTES RELATIVE PERCENT: 8.4 %
NEUTROPHILS ABSOLUTE: 7.1 K/UL (ref 1.7–7.7)
NEUTROPHILS RELATIVE PERCENT: 65.8 %
PDW BLD-RTO: 13.9 % (ref 12.4–15.4)
PLATELET # BLD: 266 K/UL (ref 135–450)
PMV BLD AUTO: 8.7 FL (ref 5–10.5)
POTASSIUM SERPL-SCNC: 4 MMOL/L (ref 3.5–5.1)
RBC # BLD: 4.86 M/UL (ref 4–5.2)
SODIUM BLD-SCNC: 140 MMOL/L (ref 136–145)
WBC # BLD: 10.8 K/UL (ref 4–11)

## 2021-03-02 PROCEDURE — 85025 COMPLETE CBC W/AUTO DIFF WBC: CPT

## 2021-03-02 PROCEDURE — 94640 AIRWAY INHALATION TREATMENT: CPT

## 2021-03-02 PROCEDURE — 6370000000 HC RX 637 (ALT 250 FOR IP): Performed by: INTERNAL MEDICINE

## 2021-03-02 PROCEDURE — 6370000000 HC RX 637 (ALT 250 FOR IP): Performed by: NURSE PRACTITIONER

## 2021-03-02 PROCEDURE — 80048 BASIC METABOLIC PNL TOTAL CA: CPT

## 2021-03-02 PROCEDURE — 2700000000 HC OXYGEN THERAPY PER DAY

## 2021-03-02 PROCEDURE — 94761 N-INVAS EAR/PLS OXIMETRY MLT: CPT

## 2021-03-02 PROCEDURE — 99223 1ST HOSP IP/OBS HIGH 75: CPT | Performed by: INTERNAL MEDICINE

## 2021-03-02 PROCEDURE — 83735 ASSAY OF MAGNESIUM: CPT

## 2021-03-02 PROCEDURE — 36415 COLL VENOUS BLD VENIPUNCTURE: CPT

## 2021-03-02 RX ORDER — ALBUTEROL SULFATE 90 UG/1
AEROSOL, METERED RESPIRATORY (INHALATION)
Qty: 1 INHALER | Refills: 11 | Status: SHIPPED | OUTPATIENT
Start: 2021-03-02

## 2021-03-02 RX ORDER — CITALOPRAM 20 MG/1
20 TABLET ORAL DAILY
Qty: 30 TABLET | Refills: 11 | Status: SHIPPED | OUTPATIENT
Start: 2021-03-02 | End: 2021-03-15 | Stop reason: ALTCHOICE

## 2021-03-02 RX ORDER — AMIODARONE HYDROCHLORIDE 200 MG/1
200 TABLET ORAL DAILY
Qty: 30 TABLET | Refills: 1 | Status: SHIPPED | OUTPATIENT
Start: 2021-03-02 | End: 2021-04-02 | Stop reason: SDUPTHER

## 2021-03-02 RX ORDER — FUROSEMIDE 40 MG/1
40 TABLET ORAL DAILY
Qty: 30 TABLET | Refills: 1 | Status: SHIPPED | OUTPATIENT
Start: 2021-03-02 | End: 2021-03-12

## 2021-03-02 RX ORDER — BUDESONIDE AND FORMOTEROL FUMARATE DIHYDRATE 80; 4.5 UG/1; UG/1
2 AEROSOL RESPIRATORY (INHALATION) 2 TIMES DAILY
Qty: 1 INHALER | Refills: 11 | Status: SHIPPED | OUTPATIENT
Start: 2021-03-02

## 2021-03-02 RX ORDER — NICOTINE 21 MG/24HR
1 PATCH, TRANSDERMAL 24 HOURS TRANSDERMAL DAILY
Qty: 30 PATCH | Refills: 2 | Status: SHIPPED | OUTPATIENT
Start: 2021-03-02

## 2021-03-02 RX ORDER — TRAZODONE HYDROCHLORIDE 50 MG/1
50 TABLET ORAL PRN
Qty: 30 TABLET | Refills: 1 | Status: SHIPPED | OUTPATIENT
Start: 2021-03-02

## 2021-03-02 RX ORDER — FAMOTIDINE 20 MG/1
20 TABLET, FILM COATED ORAL 2 TIMES DAILY
Qty: 60 TABLET | Refills: 5 | Status: SHIPPED | OUTPATIENT
Start: 2021-03-02

## 2021-03-02 RX ORDER — UMECLIDINIUM 62.5 UG/1
1 AEROSOL, POWDER ORAL DAILY
Qty: 1 EACH | Refills: 6 | Status: SHIPPED | OUTPATIENT
Start: 2021-03-02 | End: 2021-03-02 | Stop reason: HOSPADM

## 2021-03-02 RX ORDER — CARVEDILOL 12.5 MG/1
12.5 TABLET ORAL 2 TIMES DAILY WITH MEALS
Qty: 60 TABLET | Refills: 1 | Status: SHIPPED | OUTPATIENT
Start: 2021-03-02 | End: 2021-04-02 | Stop reason: SDUPTHER

## 2021-03-02 RX ORDER — LEVOTHYROXINE SODIUM 0.05 MG/1
50 TABLET ORAL DAILY
Qty: 30 TABLET | Refills: 11 | Status: SHIPPED | OUTPATIENT
Start: 2021-03-02

## 2021-03-02 RX ADMIN — LEVOTHYROXINE SODIUM 50 MCG: 0.05 TABLET ORAL at 06:38

## 2021-03-02 RX ADMIN — APIXABAN 5 MG: 5 TABLET, FILM COATED ORAL at 09:07

## 2021-03-02 RX ADMIN — FUROSEMIDE 40 MG: 40 TABLET ORAL at 09:07

## 2021-03-02 RX ADMIN — AMIODARONE HYDROCHLORIDE 200 MG: 200 TABLET ORAL at 09:07

## 2021-03-02 RX ADMIN — FAMOTIDINE 20 MG: 20 TABLET ORAL at 09:07

## 2021-03-02 RX ADMIN — IPRATROPIUM BROMIDE AND ALBUTEROL SULFATE 1 AMPULE: .5; 3 SOLUTION RESPIRATORY (INHALATION) at 08:03

## 2021-03-02 RX ADMIN — TIOTROPIUM BROMIDE INHALATION SPRAY 2 PUFF: 3.12 SPRAY, METERED RESPIRATORY (INHALATION) at 08:03

## 2021-03-02 RX ADMIN — IPRATROPIUM BROMIDE AND ALBUTEROL SULFATE 1 AMPULE: .5; 3 SOLUTION RESPIRATORY (INHALATION) at 12:47

## 2021-03-02 RX ADMIN — BUDESONIDE AND FORMOTEROL FUMARATE DIHYDRATE 2 PUFF: 80; 4.5 AEROSOL RESPIRATORY (INHALATION) at 08:03

## 2021-03-02 RX ADMIN — IPRATROPIUM BROMIDE AND ALBUTEROL SULFATE 1 AMPULE: .5; 3 SOLUTION RESPIRATORY (INHALATION) at 16:18

## 2021-03-02 ASSESSMENT — PAIN SCALES - GENERAL
PAINLEVEL_OUTOF10: 0
PAINLEVEL_OUTOF10: 0

## 2021-03-02 NOTE — CARE COORDINATION
DISCHARGE SUMMARY     DATE OF DISCHARGE: 03/02/2021    DISCHARGE DESTINATION: Home with family and home care    FACILITY  · Name: [facility]  · Address: [address] [place] [address]  · Phone: [phone]  · Fax: [phone]    TRANSPORTATION: Family will transport    Name:  Sigrid Hallman  Relationship: Brother   Time: TBD by patient, family and/or medical staff. Phone Number: [phone]    COMMENTS: SW spoke to patient via telephone and she reports that she was in agreement with medical discharge to home with a home care nurse. She declines PT/OT at this time. Requests Meds to [facility] for medication changes. No further needs noted unless indicated by patient, family and/or medical staff. Respectfully submitted,    MADELIN Hudson  [facility]   [phone]    Electronically signed by MADELIN Barnes on 3/2/2021 at 9:02 AM

## 2021-03-02 NOTE — DISCHARGE INSTR - COC
Continuity of Care Form    Patient Name: Samir Harris   :  1955  MRN:  [id number]    Admit date:  2021  Discharge date:  3/2/2021    Code Status Order: Full Code   Advance Directives:   Advance Care Flowsheet Documentation       Date/Time Healthcare Directive Type of Healthcare Directive Copy in [address] Agent's Name Healthcare Agent's Phone Number    [phone]  Yes, patient has an advance directive for healthcare treatment  Durable power of  for health care per pt.   No, copy requested from family  Healthcare power of   Karyle Chase   [phone]            Admitting Physician:  René Jolley MD  PCP: TERESA Mueller NP    Discharging Nurse: ELAINE [facility] Unit/Room#: A5O-5136/8250-45  Discharging Unit Phone Number: [phone]    Emergency Contact:   Extended Emergency Contact Information  Primary Emergency Contact: [address] Phone: [phone]  Relation: Child  Secondary Emergency Contact: Venkatalorrie MominMatthew  [place] Phone: [phone]  Mobile Phone: [phone]  Relation: Brother/Sister    Past Surgical History:  Past Surgical History:   Procedure Laterality Date    ADENOIDECTOMY      CARDIAC SURGERY       SECTION      x3    CORONARY ANGIOPLASTY WITH STENT PLACEMENT      EYE SURGERY      HERNIA REPAIR      HYSTERECTOMY      LARYNGOSCOPY N/A 2021    DIRECT LARYNGOSCOPY performed by Evgeny Forrest MD at [facility] 5265 N/A 2021    ORAL PHARYNGEAL BIOPSY performed by Evgeny Forrest MD at [facility] 96 N/A 2021    NASAL ENDOSCOPY performed by Evgeny Forrest MD at [facility] 79.         Immunization History:   Immunization History   Administered Date(s) Administered    Influenza Vaccine, unspecified formulation 2012, 2016    Influenza Virus Vaccine 10/28/2011    Influenza, Hansa Nina, IM, PF (6 mo and older Fluzone, Flulaval, Fluarix, and 3 yrs and older Afluria) 10/29/2019    Influenza, Hansa Nina, Recombinant, IM PF (Flublok 18 yrs and older) 01/29/2019    Pneumococcal Polysaccharide ([id number]) 04/16/2007, 11/14/2008, 10/28/2011, 11/01/2016       Active Problems:  Patient Active Problem List   Diagnosis Code    Atypical chest pain R07.89    COPD exacerbation ([facility]) J44.1    Coronary artery disease involving native coronary artery of native heart without angina pectoris I25.10    Type 2 diabetes mellitus ([facility] E11.9    Essential hypertension I10    Pure hypercholesterolemia E78.00    Acquired hypothyroidism E03.9    Ischemic heart disease due to coronary artery obstruction ([facility]) I24.0, I25.9    Panlobular emphysema ([facility]) J43.1    Moderate episode of recurrent major depressive disorder ([facility] F33.1    Obstructive sleep apnea syndrome G47.33    H/O total hysterectomy Z90.710    Tobacco abuse Z72.0    Morbid obesity with BMI of 40.0-44.9, adult ([facility]) E66.01, Z68.41    Acute on chronic heart failure with normal ejection fraction ([facility]) I50.33    Acute on chronic heart failure ([facility]) I50.9    Suspected sleep apnea R29.818    Nicotine dependence F17.200    Mixed hyperlipidemia E78.2    Light headed R42    Atrial flutter ([facility]) I48.92       Isolation/Infection:   Isolation            No Isolation          Patient Infection Status       Infection Onset Added Last Indicated Last Indicated By Review Planned Expiration Resolved Resolved By    None active    Resolved    COVID-19 Rule Out 02/22/21 02/22/21 02/22/21 COVID-19, Rapid (Ordered)   02/22/21 Rule-Out Test Resulted            Nurse Assessment:  Last Vital Signs: BP 90/68   Pulse 72   Temp 97.8 °F (36.6 °C) (Oral)   Resp 18   Ht 4' 11\" (1.499 m)   Wt 217 lb 6 oz (98.6 kg)   SpO2 93%   BMI 43.90 kg/m²     Last documented pain score (0-10 scale): Pain Level: 0  Last Weight:   Wt Readings from Last 1 Encounters:   03/02/21 217 lb 6 oz (98.6 kg)     Mental Status:  oriented and alert    IV Access:  - None    Nursing Mobility/ADLs:  Walking   Independent  Transfer  Independent  Bathing  Independent  Dressing  Independent  Toileting  Independent  Feeding  Independent  Med Admin  Independent  Med Delivery   whole    Wound Care Documentation and Therapy:        Elimination:  Continence:   · Bowel: yes  · Bladder: Yes  Urinary Catheter: None   Colostomy/Ileostomy/Ileal Conduit: No       Date of Last BM: ***    Intake/Output Summary (Last 24 hours) at 3/2/2021 0900  Last data filed at 3/2/2021 0836  Gross per 24 hour   Intake 1320 ml   Output 1400 ml   Net -80 ml     I/O last 3 completed shifts: In: 1080 [P.O.:1080]  Out: 1400 [Urine:1400]    Safety Concerns:     None    Impairments/Disabilities:      None    Nutrition Therapy:  Current Nutrition Therapy:   - Oral Diet:  Carb Control 4 carbs/meal (1800kcals/day) and Low Sodium (2gm)    Routes of Feeding: Oral  Liquids: No Restrictions  Daily Fluid Restriction: yes - amount 2000  Last Modified Barium Swallow with Video (Video Swallowing Test): not done    Treatments at the Time of Hospital Discharge:   Respiratory Treatments: none  Oxygen Therapy:  is on oxygen at 2 L/min per nasal cannula.   Ventilator:    - No ventilator support    Rehab Therapies: {THERAPEUTIC INTERVENTION:[id number]}  Weight Bearing Status/Restrictions: No weight bearing restirctions  Other Medical Equipment (for information only, NOT a DME order):  none  Other Treatments: ***    Patient's personal belongings (please select all that are sent with patient):  None    RN SIGNATURE:  Electronically signed by Micah Wetzel RN on 3/2/21 at 4:06 PM EST    CASE MANAGEMENT/SOCIAL WORK SECTION    Inpatient Status Date: 02/22/2021    Readmission Risk Assessment Score:  Readmission Risk              Risk of Unplanned Readmission:        17           Discharging to Facility/ Agency   · Name: [facility]  · Address: [address] [place] [address]  · Phone: [phone]  · Fax: [phone]    / signature: Electronically signed by MADELIN Valenzuela on 3/2/2021 at 9:00 AM      PHYSICIAN SECTION    Prognosis: Fair    Condition at Discharge: Stable    Rehab Potential (if transferring to Rehab): Good    Recommended Labs or Other Treatments After Discharge:     Home care VNS service. Physician Certification: I certify the above information and transfer of Janene Matthew  is necessary for the continuing treatment of the diagnosis listed and that she requires Home Care for greater 30 days.      Update Admission H&P: No change in H&P    PHYSICIAN SIGNATURE:  Electronically signed by Rory Sanchez MD on 3/2/21 at 9:43 AM EST

## 2021-03-02 NOTE — DISCHARGE SUMMARY
Hospital Medicine Discharge Summary    Patient ID: Tyson Andre      Patient's PCP: ETRESA Jacobs NP    Admit Date: 2/22/2021     Discharge Date:  3/2/2021    Admitting Physician: Liam Galindo MD     Discharge Physician: Shanthi Mohr MD     Discharge Diagnoses: Active Hospital Problems    Diagnosis    Preop examination [Z01.818]    Chronic obstructive pulmonary disease ([address] [J44.9]    Tobacco use [Z72.0]    Atrial flutter ([address] [I48.92]    Light headed [R42]    Morbid obesity with BMI of 40.0-44.9, adult ([facility] [E66.01, Z68.41]    Tobacco abuse [Z72.0]    Panlobular emphysema ([address] [J43.1]    Type 2 diabetes mellitus ([facility] [E11.9]    Essential hypertension [I10]    Acquired hypothyroidism [E03.9]       The patient was seen and examined on day of discharge and this discharge summary is in conjunction with any daily progress note from day of discharge. Hospital Course: 70yo woman with hx of CAD, PVD, COPD on O2 at baseline, presented initially with complaints of lightheadedness and near syncope. She was admitted with concerns for symptomatic orthostatic hypotension and also with new findings of ulcerated lesion on her left soft palate concerning for malignancy which has been confirmed this admission. Light-headedness  -  Patient was orthostatic. She could not tolerate even sitting up to check orthostatics.    - this has resolved with holding her BP meds. - resumed lasix PO daily. - noted Coreg dose increased per cardiology - tolerating well at 12.5mg PO BID dose.         Left SADE, h/o CAD s/p PCI 2016. Hx if inferior wall MI. Hx of cardiac arrest.   -  Duplex carotid U/S shows <50% stenosis with moderate plaque formation bilateral.   -  Already taking plavix related to prior h/o CAD s/p PCI in 2016.  She is supposed to also be taking statin but wasn't. Resumed statin.     - on discharge she will continue with ASA, eliquis and statin.            Afib RVR - self limited runs noted on telemetry. Get ECHO - reassuring with preserved EF and no major valvular abnormality reported. Cardiology involved. Started on eliquis - she was instructed to hold this for 5 days prior to her ENT surgery.            Squamous Cell Carcinoma of Soft Palate. Left soft palate oral lesion concerning for malignancy. - ENT involved. - biopsy 2/26 - path with mod differentiated squamous cell Ca. - plan for surgical removal with neck dissection - surgery now scheduled for March 17th.    - CT chest - no signs of metastatic process. - ENT requested cardiology and pulm clearance prior to surgery    - discussed with pulm - consult appreciated. OK to proceed with surgery as scheduled. Will need formal PFTs in the coming weeks, but this will not delay surgery. - Dr Choco Gabriel notified and will see her before discharge today for cardiac clearance.         COPD stable, Tobaco abuse  -  Continue home inhaler regimen.  Smoking cessation counseled. NRT provided. Nicotine patches to continue on discharge.          DM2 borderline - A1C 5.9  -  monitor BG. Physical Exam Performed:     BP 90/68   Pulse 72   Temp 98.3 °F (36.8 °C) (Oral)   Resp 18   Ht 4' 11\" (1.499 m)   Wt 217 lb 6 oz (98.6 kg)   SpO2 97%   BMI 43.90 kg/m²       General appearance: No apparent distress, appears stated age and cooperative. HEENT: Pupils equal, round, and reactive to light. Conjunctivae/corneas clear. Neck: Supple, with full range of motion. No jugular venous distention. Trachea midline. Respiratory:  Normal respiratory effort. Clear to auscultation, bilaterally without Rales/Wheezes/Rhonchi. Cardiovascular: Regular rate and rhythm with normal S1/S2 without murmurs, rubs or gallops. Abdomen: Soft, non-tender, non-distended with normal bowel sounds. Musculoskeletal: No clubbing, cyanosis or edema bilaterally.   Full range of motion without deformity. Skin: Skin color, texture, turgor normal.  No rashes or lesions. Neurologic:  Neurovascularly intact without any focal sensory/motor deficits. Cranial nerves: II-XII intact, grossly non-focal.  Psychiatric: Alert and oriented, thought content appropriate, normal insight  Capillary Refill: Brisk,< 3 seconds   Peripheral Pulses: +2 palpable, equal bilaterally          Labs: For convenience and continuity at follow-up the following most recent labs are provided:      CBC:    Lab Results   Component Value Date    WBC 10.8 03/02/2021    HGB 14.0 03/02/2021    HCT 41.5 03/02/2021     03/02/2021       Renal:    Lab Results   Component Value Date     03/02/2021    K 4.0 03/02/2021    K 4.9 02/22/2021     03/02/2021    CO2 28 03/02/2021    BUN 22 03/02/2021    CREATININE 0.8 03/02/2021    CALCIUM 9.4 03/02/2021    PHOS 3.6 12/21/2017         Significant Diagnostic Studies    Radiology:   CT SOFT TISSUE NECK W CONTRAST   Final Result   1. The patient's known left palate lesion is not evident by CT. There is no   evident complication from recent biopsy. 2. No evidence of metastatic disease in the neck. 3. Mild atherosclerotic disease. CT CHEST W CONTRAST   Final Result   1. No evidence of metastatic disease in the chest.   2. Bandlike opacities in the bilateral lower lobes with associated airway   secretions. Findings are favored to represent atelectasis/infection. 3. Severe coronary artery atherosclerosis. VL DUP CAROTID BILATERAL   Final Result      CT Head WO Contrast   Final Result   No acute intracranial process. Mild paranasal sinus disease. CTA HEAD NECK W CONTRAST   Final Result   Possible moderate or severe stenosis of the proximal left internal carotid   artery, which is obscured by motion artifact. No large vessel occlusion in the head or neck.          XR CHEST PORTABLE   Final Result   No active cardiopulmonary disease Consults:     IP CONSULT TO OTOLARYNGOLOGY  IP CONSULT TO CARDIOLOGY  IP CONSULT TO PULMONOLOGY  IP CONSULT TO HOME CARE NEEDS    Disposition:  home    Condition at Discharge: Stable    Discharge Instructions/Follow-up:  ENT March 17 for surgery. Code Status:  Full Code     Activity: activity as tolerated    Diet: regular diet      Discharge Medications:     Current Discharge Medication List           Details   amiodarone (CORDARONE) 200 MG tablet Take 1 tablet by mouth daily  Qty: 30 tablet, Refills: 1      apixaban (ELIQUIS) 5 MG TABS tablet Take 1 tablet by mouth 2 times daily  Qty: 60 tablet, Refills: 2      nicotine (NICODERM CQ) 14 MG/24HR Place 1 patch onto the skin daily  Qty: 30 patch, Refills: 2      tiotropium (SPIRIVA RESPIMAT) 2.5 MCG/ACT AERS inhaler Inhale 2 puffs into the lungs daily  Qty: 1 Inhaler, Refills: 6              Details   albuterol sulfate  (90 Base) MCG/ACT inhaler INHALE 2 PUFFS EVERY 6 HOURS AS NEEDED FOR WHEEZING  Qty: 1 Inhaler, Refills: 11    Associated Diagnoses: COPD exacerbation ([facility])      budesonide-formoterol (SYMBICORT) 80-4.5 MCG/ACT AERO Inhale 2 puffs into the lungs 2 times daily  Qty: 1 Inhaler, Refills: 11      traZODone (DESYREL) 50 MG tablet Take 1 tablet by mouth as needed for Sleep  Qty: 30 tablet, Refills: 1      citalopram (CELEXA) 20 MG tablet Take 1 tablet by mouth daily  Qty: 30 tablet, Refills: 11    Associated Diagnoses:  Moderate episode of recurrent major depressive disorder (HCC)      metFORMIN (GLUCOPHAGE) 1000 MG tablet Take 1 tablet by mouth 2 times daily (with meals)  Qty: 180 tablet, Refills: 3    Associated Diagnoses: Type 2 diabetes mellitus with complication, without long-term current use of insulin (HCC)      carvedilol (COREG) 12.5 MG tablet Take 1 tablet by mouth 2 times daily (with meals)  Qty: 60 tablet, Refills: 1    Associated Diagnoses: Coronary artery disease involving native coronary artery of native heart without angina pectoris; Essential hypertension      furosemide (LASIX) 40 MG tablet Take 1 tablet by mouth daily  Qty: 30 tablet, Refills: 1    Associated Diagnoses: Coronary artery disease involving native coronary artery of native heart without angina pectoris; Essential hypertension      levothyroxine (SYNTHROID) 50 MCG tablet Take 1 tablet by mouth Daily  Qty: 30 tablet, Refills: 11    Associated Diagnoses: Ischemic heart disease due to coronary artery obstruction (HCC)      famotidine (PEPCID) 20 MG tablet Take 1 tablet by mouth 2 times daily  Qty: 60 tablet, Refills: 5              Details   Spacer/Aero-Holding Chambers (E-Z SPACER) ALBA 1 Device by Does not apply route daily  Qty: 1 Device, Refills: 0    Associated Diagnoses: Panlobular emphysema (HCC)      aspirin 81 MG chewable tablet Take 1 tablet by mouth daily  Qty: 90 tablet, Refills: 2    Associated Diagnoses: Coronary artery disease involving native coronary artery of native heart without angina pectoris             Time Spent on discharge is more than 30 minutes in the examination, evaluation, counseling and review of medications and discharge plan. Signed:    Erik Schaefer MD   3/2/2021      Thank you TERESA Mueller NP for the opportunity to be involved in this patient's care. If you have any questions or concerns please feel free to contact me at [phone].

## 2021-03-02 NOTE — CARE COORDINATION
CHINO sent a flexible ride link from [facility] to nurse's cell phone. She will be able to order a ride at patient's convenience. She is not ready to go at this time but she can leave this evening. No further needs. Respectfully submitted,    MADELIN Rendon  [facility]   [phone]    Electronically signed by MADELIN Moreno on 3/2/2021 at 5:26 PM

## 2021-03-02 NOTE — PLAN OF CARE
Problem: Falls - Risk of:  Goal: Will remain free from falls  Description: Will remain free from falls  3/1/2021 2321 by Shabbir Espinoza RN  Outcome: Ongoing  3/1/2021 1517 by Vasquez Martinez RN  Outcome: Met This Shift  Goal: Absence of physical injury  Description: Absence of physical injury  3/1/2021 2321 by Shabbir Espinoza RN  Outcome: Ongoing  3/1/2021 1517 by Vasquez Martinez RN  Outcome: Met This Shift     Problem: OXYGENATION/RESPIRATORY FUNCTION  Goal: Patient will maintain patent airway  3/1/2021 2321 by Shabbir Espinoza RN  Outcome: Ongoing  3/1/2021 1517 by Vasquez Martinez RN  Outcome: Met This Shift  Goal: Patient will achieve/maintain normal respiratory rate/effort  Description: Respiratory rate and effort will be within normal limits for the patient  3/1/2021 2321 by Shabbir Espinoza RN  Outcome: Ongoing  3/1/2021 1517 by Vasquez Martinez RN  Outcome: Met This Shift     Problem: HEMODYNAMIC STATUS  Goal: Patient has stable vital signs and fluid balance  3/1/2021 2321 by Shabbir Espinoza RN  Outcome: Ongoing  3/1/2021 1517 by Vasquez Martinez RN  Outcome: Ongoing     Problem: FLUID AND ELECTROLYTE IMBALANCE  Goal: Fluid and electrolyte balance are achieved/maintained  3/1/2021 2321 by Shabbir Espinoza RN  Outcome: Ongoing  3/1/2021 1517 by Vasquez Martinez RN  Outcome: Met This Shift

## 2021-03-02 NOTE — CONSULTS
Patient is seen at the request of Dr. Jim Villaseñor for pre-op evaluation     PCP: TERESA Berrios NP    HISTORY OF PRESENT ILLNESS: 72y.o. year old female who was admitted on 21 for orthostatic hypotension. 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 She is scheduled for surgery for this lesion. She says she was diagnosed with COPD many years ago. This was a clinical diagnosis. She has not had PFTs and does not have emphysema on chest CT. She reports shortness of breath with climbing the steps at home and with grocery shopping. This has been present for years and improves with rest. She is able to do her chores, but does them slowly. She has an intermittent cough with white mucus. She takes Symbicort, Spiriva, Ventolin and nebulizers. She is supposed to use supplemental oxygen at night, but does not because she feels she does not need it.        PAST MEDICAL HISTORY:  Past Medical History:   Diagnosis Date    Asthma     CAD (coronary artery disease)     CHF (congestive heart failure) (formerly [facility])     [facility]     Depression     Diabetes mellitus ([facility]     Hyperlipidemia     Hypertension     Hypothyroidism     Morbid obesity with BMI of 40.0-44.9, adult ([facility] 2019    Sleep apnea     Substance abuse ([facility]     Thyroid disease     Type 2 diabetes mellitus without complication ([facility]        PAST SURGICAL HISTORY:  Past Surgical History:   Procedure Laterality Date    ADENOIDECTOMY      CARDIAC SURGERY       SECTION      x3    CORONARY ANGIOPLASTY WITH STENT PLACEMENT      EYE SURGERY      HERNIA REPAIR      HYSTERECTOMY      LARYNGOSCOPY N/A 2021    DIRECT LARYNGOSCOPY performed by Randee Gustafson MD at [facility] 5265 N/A 2021    ORAL PHARYNGEAL BIOPSY performed by Randee Gustafson MD at [facility] 96 N/A 2021    NASAL ENDOSCOPY performed by Bryce Najera MD at [address]:  Current Facility-Administered Medications: carvedilol (COREG) tablet 12.5 mg, 12.5 mg, Oral, BID WC  apixaban (ELIQUIS) tablet 5 mg, 5 mg, Oral, BID  phenol 1.4 % mouth spray 1 spray, 1 spray, Mouth/Throat, Q2H PRN  traZODone (DESYREL) tablet 50 mg, 50 mg, Oral, Nightly  hydrOXYzine (ATARAX) tablet 10 mg, 10 mg, Oral, TID PRN  amiodarone (CORDARONE) tablet 200 mg, 200 mg, Oral, BID  furosemide (LASIX) tablet 40 mg, 40 mg, Oral, Daily  famotidine (PEPCID) tablet 20 mg, 20 mg, Oral, BID  levothyroxine (SYNTHROID) tablet 50 mcg, 50 mcg, Oral, Daily  budesonide-formoterol (SYMBICORT) 80-4.5 MCG/ACT inhaler 2 puff, 2 puff, Inhalation, BID  tiotropium (SPIRIVA RESPIMAT) 2.5 MCG/ACT inhaler 2 puff, 2 puff, Inhalation, Daily  sodium chloride flush 0.9 % injection 10 mL, 10 mL, Intravenous, PRN  potassium chloride (KLOR-CON M) extended release tablet 40 mEq, 40 mEq, Oral, PRN **OR** potassium bicarb-citric acid (EFFER-K) effervescent tablet 40 mEq, 40 mEq, Oral, PRN **OR** potassium chloride 10 mEq/100 mL IVPB (Peripheral Line), 10 mEq, Intravenous, PRN  magnesium sulfate 1000 mg in dextrose 5% 100 mL IVPB, 1,000 mg, Intravenous, PRN  promethazine (PHENERGAN) tablet 12.5 mg, 12.5 mg, Oral, Q6H PRN **OR** ondansetron (ZOFRAN) injection 4 mg, 4 mg, Intravenous, Q6H PRN  acetaminophen (TYLENOL) tablet 650 mg, 650 mg, Oral, Q6H PRN **OR** acetaminophen (TYLENOL) suppository 650 mg, 650 mg, Rectal, Q6H PRN  nicotine (NICODERM CQ) 14 MG/24HR 1 patch, 1 patch, Transdermal, Daily  melatonin tablet 3 mg, 3 mg, Oral, Nightly PRN  ipratropium-albuterol (DUONEB) nebulizer solution 1 ampule, 1 ampule, Inhalation, 4x Daily PRN  [Held by provider] clopidogrel (PLAVIX) tablet 75 mg, 75 mg, Oral, Daily  ipratropium-albuterol (DUONEB) nebulizer solution 1 ampule, 1 ampule, Inhalation, Q4H WA      ALLERGIES:  Patient is allergic to oxycodone-acetaminophen; tramadol; codeine; and hydrocodone-acetaminophen. FAMILY HISTORY:  family history includes Diabetes in her father and mother; Heart Disease in her father and mother; High Blood Pressure in her father and mother. SOCIAL HISTORY:  Social History     Socioeconomic History    Marital status:      Spouse name: Not on file    Number of children: Not on file    Years of education: Not on file    Highest education level: Not on file   Occupational History    Not on file   Social Needs    Financial resource strain: Not on file    Food insecurity     Worry: Not on file     Inability: Not on file    Transportation needs     Medical: Not on file     Non-medical: Not on file   Tobacco Use    Smoking status: Former Smoker     Packs/day: 1.25     Years: 45.00     Pack years: 56.25     Types: Cigarettes     Quit date: 2019     Years since quittin.4    Smokeless tobacco: Never Used   Substance and Sexual Activity    Alcohol use: No    Drug use: No    Sexual activity: Not Currently   Lifestyle    Physical activity     Days per week: Not on file     Minutes per session: Not on file    Stress: Not on file   Relationships    Social connections     Talks on phone: Not on file     Gets together: Not on file     Attends Protestant service: Not on file     Active member of club or organization: Not on file     Attends meetings of clubs or organizations: Not on file     Relationship status: Not on file    Intimate partner violence     Fear of current or ex partner: Not on file     Emotionally abused: Not on file     Physically abused: Not on file     Forced sexual activity: Not on file   Other Topics Concern    Not on file   Social History Narrative    Not on file      reports that she quit smoking about 17 months ago. Her smoking use included cigarettes. She has a 56.25 pack-year smoking history.  She has never used smokeless tobacco.    REVIEW OF SYSTEMS:  Constitutional: Negative for fever  HENT: Negative for sore throat  Eyes: Negative for redness   Respiratory: + for exertional dyspnea, +chronic cough  Cardiovascular: Negative for chest pain  Gastrointestinal: Negative for vomiting, diarrhea   Genitourinary: Negative for hematuria   Musculoskeletal: Negative for arthralgias   Skin: Negative for rash  Neurological: Negative for syncope  Hematological: Negative for adenopathy  Psychiatric/Behavorial: Negative for anxiety    Objective:   PHYSICAL EXAM:  Blood pressure (!) 180/83, pulse 112, temperature 97.8 °F (36.6 °C), temperature source Oral, resp. rate 18, height 4' 11\" (1.499 m), weight 217 lb 6 oz (98.6 kg), SpO2 93 %, not currently breastfeeding. on RA    Gen: Well developed; well nourished  Eyes: No scleral icterus. No conjunctival injection. ENT:  Oropharynx clear. External appearance of ears and nose normal.  Neck: Trachea midline. No obvious mass. No visible thyroid enlargement    Respiratory: Clear to auscultation bilaterally, no accessory muscle use  Cardiovascular: Regular rate and rhythm, no appreciable murmurs. No edema. Gastrointestinal: Soft, non-tender. No hernia  Skin: Warm and dry. No rashes or ulcers on visible areas. Normal texture and turgor  Lymphatic: No cervical LAD. No supraclavicular LAD. Musculoskeletal: No cyanosis, clubbing or joint deformity. Psychiatric: Normal mood and affect; exhibits normal insight and judgement       Data Reviewed:   LABS:  CBC:   Recent Labs     02/28/21  0645 03/01/21  0725 03/02/21  0703   WBC 9.0 8.1 10.8   HGB 12.5 13.7 14.0   HCT 37.7 40.8 41.5   MCV 85.7 85.4 85.5    260 266     BMP:   Recent Labs     02/27/21  0855 02/28/21  0645 03/01/21  0725    143 138   K 4.3 4.0 4.1    105 103   CO2 27 30 28   BUN 11 13 16   CREATININE 0.7 0.6 0.7     LIVER PROFILE: No results for input(s): AST, ALT, LIPASE, BILIDIR, BILITOT, ALKPHOS in the last 72 hours. Invalid input(s):   AMYLASE,  ALB  PT/INR: No results for input(s): PROTIME, INR in the last 72 hours. APTT: No results for input(s): APTT in the last 72 hours. Images and reports from chest imaging were reviewed by me. My interpretation is:  CXR (2/22/21): Clear lung fields   Chest CT (3/1/21): No LAD; BLL atelectasis; tiny nodules in the NANCY and lingula (unchanged compared to 9/2019)      ECHO (2/22/21)  Summary   Mod conc. LVH with normal LV size & wall motion. EF is    60%. Normal   diastolic function. Left atrium is of normal size. Normal right ventricular size and function. Trivial mitral, aortic and tricuspid regurgitation. Assessment:     Pre-operative evaluation   COPD (clinical diagnosis)  Tobacco use     Plan:      Pre-operative evaluation   -Based on the Arozull respiratory failure index she has a 4.2% risk of post-operative respiratory failure. There are no pulmonary contraindications to proceeding with surgery. She should continue her bronchodilators in the tracie-operative period. COPD (clinical diagnosis)  -Needs PFTs to confirm  -Continue current bronchodilator regimen    Tobacco use   -She says she will not smoke again  -Nicotine dependence       Thank you for allowing me to participate in the care of this patient. Will sign off. Please call with any questions or concerns. Needs to follow up with Dr. Marcy Han.      Deanna Alarcon MD  [facility] Pulmonary, Critical Care and Sleep

## 2021-03-02 NOTE — CARE COORDINATION
SW left message for [facility] care liaison to find out if they can staff for RN visits post discharge. Patient declines PT/OT. Respectfully submitted,    MADELIN Pastor  [facility]   [phone]    Electronically signed by MADELIN Corbin on 3/2/2021 at 9:05 AM

## 2021-03-03 NOTE — ADT AUTH CERT
Patient Demographics    Name Patient ID SSN Gender Identity Birth Date   Ana Corado [id number]  Female 55 (72 yrs)   Address Phone Email Employer    [address] [phone] (W)   [phone] (M) -- [address] [employer] Race Occupation Emp Status    Dedham White -- Disabled    Reg Status PCP Date Last Verified Next Review Date    Verified TERESA Fierro NP  [phone] 21    Admission Date Discharge Date Admitting Provider     21 Natividad Walsh MD     Marital Status Worship       None      Emergency Contact 1 Emergency Contact [address] [facility] [phone] Jacklyn Julian [phone] Amara Pak)   Subscriber Details  Hospital Account [de-identified]  [facility] Subscriber Name/Sex/Relation Subscriber  Subscriber Address/Phone Subscriber Emp/Emp Phone   1. [address] [employer]   [id number] Sarah Mcfarland - Female   (Self) 1955 [address] [place], [place]  [address] [phone](Y) DISABLED    Utilization Reviews       Dizziness - Care Day 8 (3/1/2021) by Mark Russo RN       Review Status Review Entered   Completed 3/2/2021 07:49      Criteria Review      Care Day: 8 Care Date: 3/1/2021 Level of Care: Telemetry    Guideline Day 2    Level Of Care    (X) Floor or intermediate care to discharge    Clinical Status    (X) * Vertigo or dizziness absent or managed    ( ) * Hemodynamic stability    3/2/2021 7:49 AM EST by Munroe Newman      00.1-41-04-    ( ) * Dangerous arrhythmia absent    ( ) * No evidence of bacterial labyrinthitis    ( ) * No cardiac or neurologic events or findings requiring inpatient care identified    ( ) * Oral hydration and diet tolerated    ( ) * Discharge plans and education understood    Activity    ( ) * Ambulatory or acceptable for next level of care    Routes    (X) * Oral hydration, medications, and diet  Smoking cessation counseled. NRT provided.       DM2 borderline - A1C 5.9   -  monitor BG.        DVT Prophylaxis: lovenox   Diet: DIET CARB CONTROL; Low Sodium (2 GM); Daily Fluid Restriction: 2000 ml   Code Status: Full Code       Dispo - cc. Discharge tomorrow am - if able will complete pulm and cardiology clearance for surgery as requested by ENT.          Dizziness - Care Day 5 (2/26/2021) by Richard Edwards RN       Review Status Review Entered   Completed 2/26/2021 14:02      Criteria Review      Care Day: 5 Care Date: 2/26/2021 Level of Care: Telemetry    Guideline Day 2    Level Of Care    (X) Floor or intermediate care to discharge    Clinical Status    ( ) * Vertigo or dizziness absent or managed    (X) * Hemodynamic stability    2/26/2021 2:01 PM EST by Светлана      98.1-70-/72-92% on RA --per nursing uses o2 prn    ( ) * Dangerous arrhythmia absent    ( ) * No evidence of bacterial labyrinthitis    ( ) * No cardiac or neurologic events or findings requiring inpatient care identified    ( ) * Oral hydration and diet tolerated    ( ) * Discharge plans and education understood    Activity    ( ) * Ambulatory or acceptable for next level of care    Routes    (X) * Oral hydration, medications, and diet    2/26/2021 2:01 PM EST by Светлана      IVF @ 75ml/hr    * Milestone   Additional Notes   02/26/2021   Lungs diminished maximino, inspiratory wheezing       wbc 14.7   hgb 13.7   hct 42.1      duonebs q4 w/a    coreg increased to 25 mg bid       Per Internal Medicine-   S/p Biopsy of oral lesion 2/25 with ENT       Assessment/Plan:Active Hospital Problems   Diagnosis   Light headed    Morbid obesity with BMI of 40.0-44.9, adult    Tobacco abuse    Panlobular emphysema   Type 2 diabetes mellitus    Essential hypertension    Acquired hypothyroidism        Light-headedness   -  Patient is orthostatic. She could not tolerate even sitting up to check orthostatics.      - this has resolved with holding her BP meds.      - resumed lasix PO daily.       - noted Coreg dose increased per cardiology - reassess if she can tolerate that.        Left SADE, h/o CAD s/p PCI 2016. Hx if inferior wall MI. Hx of cardiac arrest.    -  Moderate or severe left internal SADE per CT-A head/neck. Study was of limited quality d/t motion artifacts. This finding is significant in the long term but does not explain patient's light-headedness. -  Duplex carotid U/S shows <50% stenosis with moderate plaque formation bilateral.    -  Already taking plavix related to prior h/o CAD s/p PCI in 2016. She is supposed to also be taking statin but wasn't. Resumed statin.        Afib RVR - self limited runs noted on telemetry. Get ECHO - pending.     Cardiology involved. Will need AC, once ok with ENT post biopsy.        Oral lesion concerning for malignancy. - ENT involved. - biopsy 2/26 - path pending.         COPD stable, Tobaco abuse   -  Continue home inhaler regimen. Smoking cessation counseled. NRT provided.       DM2 borderline - A1C 5.9   -  monitor BG.        DVT Prophylaxis: lovenox   Diet: DIET CARB CONTROL; Low Sodium (2 GM);  Daily Fluid Restriction: 2000 ml   Code Status: Full Code       Dispo - cc

## 2021-03-03 NOTE — PROGRESS NOTES
CLINICAL PHARMACY NOTE: MEDS TO [facility] Select Patient?: No  Total # of Prescriptions Filled: 12   The following medications were delivered to the patient:  Discharge Medication List as of 3/2/2021  4:07 PM      START taking these medications    Details   amiodarone (CORDARONE) 200 MG tablet Take 1 tablet by mouth daily, Disp-30 tablet, R-1Normal      apixaban (ELIQUIS) 5 MG TABS tablet Take 1 tablet by mouth 2 times daily, Disp-60 tablet, R-2Normal      nicotine (NICODERM CQ) 14 MG/24HR Place 1 patch onto the skin daily, Disp-30 patch, R-2Normal      tiotropium (SPIRIVA RESPIMAT) 2.5 MCG/ACT AERS inhaler Inhale 2 puffs into the lungs daily, Disp-1 Inhaler, R-6NO PRINT         · albuterol sulfate hfa 108  · symbicort 80-4.5  · Citalopram 20mg  · Famotidine 20mg  · Trazodone 50mg  · Metformin 1000mg  · Levothyroxine 50mcg  · carevedilol 12.5mg  Total # of Interventions Completed: 0  Time Spent (min): 30    Additional Documentation:

## 2021-03-04 ENCOUNTER — TELEPHONE (OUTPATIENT)
Dept: ENT CLINIC | Age: 66
End: 2021-03-04

## 2021-03-04 NOTE — TELEPHONE ENCOUNTER
Pt has surgery on 3/17/21; needs information on surgery details, instructions and time to be at hospital. Spoke with  and she asked that we call pt to give her the info. Spoke with patient and gave her surgery time (noon, be at hospital by 10am per rakesh). Told pt Oral Quirk will call her on another day to give surgery instructions.

## 2021-03-04 NOTE — CARE COORDINATION
Patient called Case Management asking for the time she needs to be at the hospital on 3/17/21 for surgery. Pt. Stated she needs to secure a ride through medicare a minimum of 5 days prior. I called Dr. Perez Filter office at [phone] and spoke w/Aimee. Cornelius Aly stated surgery is a noon and she needs arrive by 10am.  I asked Cornelius Aly to call the patient with this information and any other information the patient may need. Cornelius Aly confirmed that she would call the patient at [phone]. Daniel Aguiar, .  Administrative Assist, Case Management  [phone]  Electronically signed by Daniel Aguiar on 3/4/2021 at 11:54 AM

## 2021-03-12 DIAGNOSIS — I25.10 CORONARY ARTERY DISEASE INVOLVING NATIVE CORONARY ARTERY OF NATIVE HEART WITHOUT ANGINA PECTORIS: ICD-10-CM

## 2021-03-12 DIAGNOSIS — I10 ESSENTIAL HYPERTENSION: ICD-10-CM

## 2021-03-12 RX ORDER — FUROSEMIDE 40 MG/1
TABLET ORAL
Qty: 60 TABLET | Refills: 5 | Status: SHIPPED | OUTPATIENT
Start: 2021-03-12

## 2021-03-15 RX ORDER — HYDROXYZINE HYDROCHLORIDE 10 MG/1
10 TABLET, FILM COATED ORAL 3 TIMES DAILY PRN
COMMUNITY

## 2021-03-15 NOTE — PROGRESS NOTES
C-Difficile admission screening and protocol:     * Admitted with diarrhea? YES____    NO___X__     *Prior history of C-Diff. In last 3 months? YES____   NO__X__     *Antibiotic use in the past 6-8 weeks? NO__X____YES______                 If yes which  ANTIBIOTIC AND REASON______     *Prior hospitalization or nursing home in the last month?  YES____   NO__X__

## 2021-03-15 NOTE — PROGRESS NOTES
[facility] time____1030________        Surgery time____1200________    Take the following medications with a sip of water: Follow your MD/Surgeons pre-procedure instructions regarding your medications    Do not eat or drink anything after 12:00 midnight prior to your surgery. This includes water chewing gum, mints and ice chips. You may brush your teeth and gargle the morning of your surgery, but do not swallow the water     Please see your family doctor/pediatrician for a history and physical and/or concerning medications. Bring any test results/reports from your physicians office. If you are under the care of a heart doctor or specialist doctor, please be aware that you may be asked to them for clearance    You may be asked to stop blood thinners such as Coumadin, Plavix, Fragmin, Lovenox, etc., or any anti-inflammatories such as:  Aspirin, Ibuprofen, Advil, Naproxen prior to your surgery. We also ask that you stop any OTC medications such as fish oil, vitamin E, glucosamine, garlic, Multivitamins, COQ 10, etc.    We ask that you do not smoke 24 hours prior to surgery  We ask that you do not  drink any alcoholic beverages 24 hours prior to surgery     You must make arrangements for a responsible adult to take you home after your surgery. For your safety you will not be allowed to leave alone or drive yourself home. Your surgery will be cancelled if you do not have a ride home. Also for your safety, it is strongly suggested that someone stay with you the first 24 hours after your surgery. A parent or legal guardian must accompany a child scheduled for surgery and plan to stay at the hospital until the child is discharged. Please do not bring other children with you. For your comfort, please wear simple loose fitting clothing to the hospital.  Please do not bring valuables.     Do not wear any make-up or nail polish on your fingers or

## 2021-03-16 ENCOUNTER — ANESTHESIA EVENT (OUTPATIENT)
Dept: OPERATING ROOM | Age: 66
DRG: 141 | End: 2021-03-16
Payer: COMMERCIAL

## 2021-03-17 ENCOUNTER — ANESTHESIA (OUTPATIENT)
Dept: OPERATING ROOM | Age: 66
DRG: 141 | End: 2021-03-17
Payer: COMMERCIAL

## 2021-03-17 ENCOUNTER — HOSPITAL ENCOUNTER (INPATIENT)
Age: 66
LOS: 5 days | Discharge: HOME | DRG: 141 | End: 2021-03-22
Attending: OTOLARYNGOLOGY | Admitting: OTOLARYNGOLOGY
Payer: COMMERCIAL

## 2021-03-17 ENCOUNTER — APPOINTMENT (OUTPATIENT)
Dept: GENERAL RADIOLOGY | Age: 66
DRG: 141 | End: 2021-03-17
Attending: OTOLARYNGOLOGY
Payer: COMMERCIAL

## 2021-03-17 VITALS
TEMPERATURE: 98.1 F | SYSTOLIC BLOOD PRESSURE: 111 MMHG | OXYGEN SATURATION: 94 % | DIASTOLIC BLOOD PRESSURE: 68 MMHG | RESPIRATION RATE: 16 BRPM

## 2021-03-17 DIAGNOSIS — C10.9 OROPHARYNGEAL CANCER (HCC): ICD-10-CM

## 2021-03-17 PROBLEM — Z98.890 STATUS POST DISSECTION OF NECK: Status: ACTIVE | Noted: 2021-03-17

## 2021-03-17 PROBLEM — C05.9: Status: ACTIVE | Noted: 2021-03-17

## 2021-03-17 LAB
ABO/RH: NORMAL
ANTIBODY SCREEN: NORMAL
GLUCOSE BLD-MCNC: 107 MG/DL (ref 70–99)
PERFORMED ON: ABNORMAL
SARS-COV-2, NAAT: NOT DETECTED

## 2021-03-17 PROCEDURE — 71045 X-RAY EXAM CHEST 1 VIEW: CPT

## 2021-03-17 PROCEDURE — 6360000002 HC RX W HCPCS

## 2021-03-17 PROCEDURE — 88332 PATH CONSLTJ SURG EA ADD BLK: CPT

## 2021-03-17 PROCEDURE — 6370000000 HC RX 637 (ALT 250 FOR IP): Performed by: OTOLARYNGOLOGY

## 2021-03-17 PROCEDURE — 2500000003 HC RX 250 WO HCPCS

## 2021-03-17 PROCEDURE — 2580000003 HC RX 258: Performed by: ANESTHESIOLOGY

## 2021-03-17 PROCEDURE — 2709999900 HC NON-CHARGEABLE SUPPLY: Performed by: OTOLARYNGOLOGY

## 2021-03-17 PROCEDURE — 88342 IMHCHEM/IMCYTCHM 1ST ANTB: CPT

## 2021-03-17 PROCEDURE — 3600000014 HC SURGERY LEVEL 4 ADDTL 15MIN: Performed by: OTOLARYNGOLOGY

## 2021-03-17 PROCEDURE — 6360000002 HC RX W HCPCS: Performed by: OTOLARYNGOLOGY

## 2021-03-17 PROCEDURE — 86901 BLOOD TYPING SEROLOGIC RH(D): CPT

## 2021-03-17 PROCEDURE — 87635 SARS-COV-2 COVID-19 AMP PRB: CPT

## 2021-03-17 PROCEDURE — 07T20ZZ RESECTION OF LEFT NECK LYMPHATIC, OPEN APPROACH: ICD-10-PCS | Performed by: OTOLARYNGOLOGY

## 2021-03-17 PROCEDURE — 2580000003 HC RX 258: Performed by: OTOLARYNGOLOGY

## 2021-03-17 PROCEDURE — 2000000000 HC ICU R&B

## 2021-03-17 PROCEDURE — 3700000000 HC ANESTHESIA ATTENDED CARE: Performed by: OTOLARYNGOLOGY

## 2021-03-17 PROCEDURE — 3700000001 HC ADD 15 MINUTES (ANESTHESIA): Performed by: OTOLARYNGOLOGY

## 2021-03-17 PROCEDURE — 42120 REMOVE PALATE/LESION: CPT | Performed by: OTOLARYNGOLOGY

## 2021-03-17 PROCEDURE — 2500000003 HC RX 250 WO HCPCS: Performed by: OTOLARYNGOLOGY

## 2021-03-17 PROCEDURE — 88305 TISSUE EXAM BY PATHOLOGIST: CPT

## 2021-03-17 PROCEDURE — 38724 REMOVAL OF LYMPH NODES NECK: CPT | Performed by: OTOLARYNGOLOGY

## 2021-03-17 PROCEDURE — 2720000010 HC SURG SUPPLY STERILE: Performed by: OTOLARYNGOLOGY

## 2021-03-17 PROCEDURE — 88331 PATH CONSLTJ SURG 1 BLK 1SPC: CPT

## 2021-03-17 PROCEDURE — 86850 RBC ANTIBODY SCREEN: CPT

## 2021-03-17 PROCEDURE — 86900 BLOOD TYPING SEROLOGIC ABO: CPT

## 2021-03-17 PROCEDURE — 3600000004 HC SURGERY LEVEL 4 BASE: Performed by: OTOLARYNGOLOGY

## 2021-03-17 PROCEDURE — 0CB30ZX EXCISION OF SOFT PALATE, OPEN APPROACH, DIAGNOSTIC: ICD-10-PCS | Performed by: OTOLARYNGOLOGY

## 2021-03-17 PROCEDURE — 2580000003 HC RX 258

## 2021-03-17 PROCEDURE — 94640 AIRWAY INHALATION TREATMENT: CPT

## 2021-03-17 RX ORDER — BUDESONIDE AND FORMOTEROL FUMARATE DIHYDRATE 80; 4.5 UG/1; UG/1
2 AEROSOL RESPIRATORY (INHALATION) 2 TIMES DAILY
Status: DISCONTINUED | OUTPATIENT
Start: 2021-03-17 | End: 2021-03-22 | Stop reason: HOSPADM

## 2021-03-17 RX ORDER — AMIODARONE HYDROCHLORIDE 200 MG/1
200 TABLET ORAL DAILY
Status: DISCONTINUED | OUTPATIENT
Start: 2021-03-17 | End: 2021-03-22 | Stop reason: HOSPADM

## 2021-03-17 RX ORDER — ONDANSETRON 2 MG/ML
4 INJECTION INTRAMUSCULAR; INTRAVENOUS
Status: DISCONTINUED | OUTPATIENT
Start: 2021-03-17 | End: 2021-03-17 | Stop reason: HOSPADM

## 2021-03-17 RX ORDER — FENTANYL CITRATE 50 UG/ML
25 INJECTION, SOLUTION INTRAMUSCULAR; INTRAVENOUS EVERY 5 MIN PRN
Status: DISCONTINUED | OUTPATIENT
Start: 2021-03-17 | End: 2021-03-17 | Stop reason: HOSPADM

## 2021-03-17 RX ORDER — SODIUM CHLORIDE 9 MG/ML
INJECTION, SOLUTION INTRAVENOUS CONTINUOUS
Status: DISCONTINUED | OUTPATIENT
Start: 2021-03-17 | End: 2021-03-18

## 2021-03-17 RX ORDER — ATORVASTATIN CALCIUM 10 MG/1
10 TABLET, FILM COATED ORAL NIGHTLY
Status: DISCONTINUED | OUTPATIENT
Start: 2021-03-18 | End: 2021-03-22 | Stop reason: HOSPADM

## 2021-03-17 RX ORDER — AMPICILLIN AND SULBACTAM 1; .5 G/1; G/1
INJECTION, POWDER, FOR SOLUTION INTRAMUSCULAR; INTRAVENOUS PRN
Status: DISCONTINUED | OUTPATIENT
Start: 2021-03-17 | End: 2021-03-17 | Stop reason: SDUPTHER

## 2021-03-17 RX ORDER — ALBUTEROL SULFATE 2.5 MG/3ML
2.5 SOLUTION RESPIRATORY (INHALATION) EVERY 4 HOURS PRN
Status: DISCONTINUED | OUTPATIENT
Start: 2021-03-17 | End: 2021-03-22 | Stop reason: HOSPADM

## 2021-03-17 RX ORDER — SODIUM CHLORIDE 0.9 % (FLUSH) 0.9 %
10 SYRINGE (ML) INJECTION EVERY 12 HOURS SCHEDULED
Status: DISCONTINUED | OUTPATIENT
Start: 2021-03-17 | End: 2021-03-17 | Stop reason: HOSPADM

## 2021-03-17 RX ORDER — POLYETHYLENE GLYCOL 3350 17 G/17G
17 POWDER, FOR SOLUTION ORAL DAILY PRN
Status: DISCONTINUED | OUTPATIENT
Start: 2021-03-17 | End: 2021-03-22 | Stop reason: HOSPADM

## 2021-03-17 RX ORDER — PROPOFOL 10 MG/ML
INJECTION, EMULSION INTRAVENOUS PRN
Status: DISCONTINUED | OUTPATIENT
Start: 2021-03-17 | End: 2021-03-17 | Stop reason: SDUPTHER

## 2021-03-17 RX ORDER — ONDANSETRON 2 MG/ML
INJECTION INTRAMUSCULAR; INTRAVENOUS PRN
Status: DISCONTINUED | OUTPATIENT
Start: 2021-03-17 | End: 2021-03-17 | Stop reason: SDUPTHER

## 2021-03-17 RX ORDER — BACITRACIN ZINC AND POLYMYXIN B SULFATE 500; 1000 [USP'U]/G; [USP'U]/G
OINTMENT TOPICAL
Status: COMPLETED | OUTPATIENT
Start: 2021-03-17 | End: 2021-03-17

## 2021-03-17 RX ORDER — SODIUM CHLORIDE 0.9 % (FLUSH) 0.9 %
10 SYRINGE (ML) INJECTION EVERY 12 HOURS SCHEDULED
Status: DISCONTINUED | OUTPATIENT
Start: 2021-03-17 | End: 2021-03-22 | Stop reason: HOSPADM

## 2021-03-17 RX ORDER — LEVOTHYROXINE SODIUM 0.05 MG/1
50 TABLET ORAL DAILY
Status: DISCONTINUED | OUTPATIENT
Start: 2021-03-17 | End: 2021-03-22 | Stop reason: HOSPADM

## 2021-03-17 RX ORDER — EPHEDRINE SULFATE/0.9% NACL/PF 50 MG/5 ML
SYRINGE (ML) INTRAVENOUS PRN
Status: DISCONTINUED | OUTPATIENT
Start: 2021-03-17 | End: 2021-03-17 | Stop reason: SDUPTHER

## 2021-03-17 RX ORDER — HYDROCODONE BITARTRATE AND ACETAMINOPHEN 5; 325 MG/1; MG/1
2 TABLET ORAL EVERY 6 HOURS PRN
Status: DISCONTINUED | OUTPATIENT
Start: 2021-03-17 | End: 2021-03-22 | Stop reason: HOSPADM

## 2021-03-17 RX ORDER — LIDOCAINE HYDROCHLORIDE 20 MG/ML
INJECTION, SOLUTION EPIDURAL; INFILTRATION; INTRACAUDAL; PERINEURAL PRN
Status: DISCONTINUED | OUTPATIENT
Start: 2021-03-17 | End: 2021-03-17 | Stop reason: SDUPTHER

## 2021-03-17 RX ORDER — FIBRINOGEN HUMAN AND THROMBIN HUMAN 10 ML
KIT TOPICAL
Status: COMPLETED | OUTPATIENT
Start: 2021-03-17 | End: 2021-03-17

## 2021-03-17 RX ORDER — CARVEDILOL 12.5 MG/1
12.5 TABLET ORAL 2 TIMES DAILY WITH MEALS
Status: DISCONTINUED | OUTPATIENT
Start: 2021-03-17 | End: 2021-03-22 | Stop reason: HOSPADM

## 2021-03-17 RX ORDER — SODIUM CHLORIDE 0.9 % (FLUSH) 0.9 %
10 SYRINGE (ML) INJECTION PRN
Status: DISCONTINUED | OUTPATIENT
Start: 2021-03-17 | End: 2021-03-22 | Stop reason: HOSPADM

## 2021-03-17 RX ORDER — MORPHINE SULFATE 4 MG/ML
4 INJECTION, SOLUTION INTRAMUSCULAR; INTRAVENOUS
Status: DISCONTINUED | OUTPATIENT
Start: 2021-03-17 | End: 2021-03-22 | Stop reason: HOSPADM

## 2021-03-17 RX ORDER — SODIUM CHLORIDE 9 MG/ML
INJECTION, SOLUTION INTRAVENOUS CONTINUOUS PRN
Status: DISCONTINUED | OUTPATIENT
Start: 2021-03-17 | End: 2021-03-17 | Stop reason: SDUPTHER

## 2021-03-17 RX ORDER — GINSENG 100 MG
CAPSULE ORAL EVERY 8 HOURS
Status: DISCONTINUED | OUTPATIENT
Start: 2021-03-17 | End: 2021-03-22 | Stop reason: HOSPADM

## 2021-03-17 RX ORDER — MIDAZOLAM HYDROCHLORIDE 1 MG/ML
INJECTION INTRAMUSCULAR; INTRAVENOUS PRN
Status: DISCONTINUED | OUTPATIENT
Start: 2021-03-17 | End: 2021-03-17 | Stop reason: SDUPTHER

## 2021-03-17 RX ORDER — DEXAMETHASONE SODIUM PHOSPHATE 4 MG/ML
INJECTION, SOLUTION INTRA-ARTICULAR; INTRALESIONAL; INTRAMUSCULAR; INTRAVENOUS; SOFT TISSUE PRN
Status: DISCONTINUED | OUTPATIENT
Start: 2021-03-17 | End: 2021-03-17 | Stop reason: SDUPTHER

## 2021-03-17 RX ORDER — SODIUM CHLORIDE 0.9 % (FLUSH) 0.9 %
10 SYRINGE (ML) INJECTION PRN
Status: DISCONTINUED | OUTPATIENT
Start: 2021-03-17 | End: 2021-03-17 | Stop reason: HOSPADM

## 2021-03-17 RX ORDER — NICOTINE 21 MG/24HR
1 PATCH, TRANSDERMAL 24 HOURS TRANSDERMAL DAILY
Status: DISCONTINUED | OUTPATIENT
Start: 2021-03-17 | End: 2021-03-22 | Stop reason: HOSPADM

## 2021-03-17 RX ORDER — ONDANSETRON 4 MG/1
4 TABLET, ORALLY DISINTEGRATING ORAL EVERY 8 HOURS PRN
Status: DISCONTINUED | OUTPATIENT
Start: 2021-03-17 | End: 2021-03-22 | Stop reason: HOSPADM

## 2021-03-17 RX ORDER — ONDANSETRON 2 MG/ML
4 INJECTION INTRAMUSCULAR; INTRAVENOUS EVERY 6 HOURS PRN
Status: DISCONTINUED | OUTPATIENT
Start: 2021-03-17 | End: 2021-03-22 | Stop reason: HOSPADM

## 2021-03-17 RX ORDER — MAGNESIUM HYDROXIDE 1200 MG/15ML
LIQUID ORAL CONTINUOUS PRN
Status: COMPLETED | OUTPATIENT
Start: 2021-03-17 | End: 2021-03-17

## 2021-03-17 RX ORDER — LIDOCAINE HYDROCHLORIDE AND EPINEPHRINE 10; 10 MG/ML; UG/ML
INJECTION, SOLUTION INFILTRATION; PERINEURAL
Status: COMPLETED | OUTPATIENT
Start: 2021-03-17 | End: 2021-03-17

## 2021-03-17 RX ORDER — TRAZODONE HYDROCHLORIDE 50 MG/1
50 TABLET ORAL PRN
Status: DISCONTINUED | OUTPATIENT
Start: 2021-03-17 | End: 2021-03-22 | Stop reason: HOSPADM

## 2021-03-17 RX ORDER — FAMOTIDINE 20 MG/1
20 TABLET, FILM COATED ORAL 2 TIMES DAILY
Status: DISCONTINUED | OUTPATIENT
Start: 2021-03-17 | End: 2021-03-22 | Stop reason: HOSPADM

## 2021-03-17 RX ORDER — ROCURONIUM BROMIDE 10 MG/ML
INJECTION, SOLUTION INTRAVENOUS PRN
Status: DISCONTINUED | OUTPATIENT
Start: 2021-03-17 | End: 2021-03-17 | Stop reason: SDUPTHER

## 2021-03-17 RX ORDER — FENTANYL CITRATE 50 UG/ML
INJECTION, SOLUTION INTRAMUSCULAR; INTRAVENOUS PRN
Status: DISCONTINUED | OUTPATIENT
Start: 2021-03-17 | End: 2021-03-17 | Stop reason: SDUPTHER

## 2021-03-17 RX ADMIN — Medication 10 MG: at 15:44

## 2021-03-17 RX ADMIN — CARVEDILOL 12.5 MG: 12.5 TABLET, FILM COATED ORAL at 18:25

## 2021-03-17 RX ADMIN — PHENYLEPHRINE HYDROCHLORIDE 100 MCG/MIN: 10 INJECTION INTRAVENOUS at 14:05

## 2021-03-17 RX ADMIN — SODIUM CHLORIDE: 9 INJECTION, SOLUTION INTRAVENOUS at 09:48

## 2021-03-17 RX ADMIN — SUGAMMADEX 200 MG: 100 INJECTION, SOLUTION INTRAVENOUS at 16:59

## 2021-03-17 RX ADMIN — FAMOTIDINE 20 MG: 20 TABLET, FILM COATED ORAL at 20:38

## 2021-03-17 RX ADMIN — HYDROMORPHONE HYDROCHLORIDE 0.5 MG: 1 INJECTION, SOLUTION INTRAMUSCULAR; INTRAVENOUS; SUBCUTANEOUS at 14:02

## 2021-03-17 RX ADMIN — FENTANYL CITRATE 50 MCG: 50 INJECTION INTRAMUSCULAR; INTRAVENOUS at 12:14

## 2021-03-17 RX ADMIN — Medication 10 MG: at 15:14

## 2021-03-17 RX ADMIN — HYDROCODONE BITARTRATE AND ACETAMINOPHEN 2 TABLET: 5; 325 TABLET ORAL at 20:38

## 2021-03-17 RX ADMIN — SODIUM CHLORIDE 3000 MG: 900 INJECTION INTRAVENOUS at 22:32

## 2021-03-17 RX ADMIN — LIDOCAINE HYDROCHLORIDE 60 MG: 20 INJECTION, SOLUTION EPIDURAL; INFILTRATION; INTRACAUDAL; PERINEURAL at 12:15

## 2021-03-17 RX ADMIN — ROCURONIUM BROMIDE 50 MG: 10 INJECTION INTRAVENOUS at 12:15

## 2021-03-17 RX ADMIN — ONDANSETRON 4 MG: 2 INJECTION INTRAMUSCULAR; INTRAVENOUS at 21:04

## 2021-03-17 RX ADMIN — SODIUM CHLORIDE: 9 INJECTION, SOLUTION INTRAVENOUS at 12:55

## 2021-03-17 RX ADMIN — Medication 10 MG: at 15:29

## 2021-03-17 RX ADMIN — PHENYLEPHRINE HYDROCHLORIDE 100 MCG: 10 INJECTION INTRAVENOUS at 13:37

## 2021-03-17 RX ADMIN — FENTANYL CITRATE 50 MCG: 50 INJECTION INTRAMUSCULAR; INTRAVENOUS at 12:36

## 2021-03-17 RX ADMIN — BACITRACIN ZINC: 500 OINTMENT TOPICAL at 20:39

## 2021-03-17 RX ADMIN — AMIODARONE HYDROCHLORIDE 200 MG: 200 TABLET ORAL at 18:25

## 2021-03-17 RX ADMIN — PHENYLEPHRINE HYDROCHLORIDE 200 MCG: 10 INJECTION INTRAVENOUS at 13:44

## 2021-03-17 RX ADMIN — PROPOFOL 150 MG: 10 INJECTION, EMULSION INTRAVENOUS at 12:15

## 2021-03-17 RX ADMIN — MORPHINE SULFATE 4 MG: 4 INJECTION, SOLUTION INTRAMUSCULAR; INTRAVENOUS at 22:32

## 2021-03-17 RX ADMIN — MIDAZOLAM 2 MG: 1 INJECTION INTRAMUSCULAR; INTRAVENOUS at 11:52

## 2021-03-17 RX ADMIN — MORPHINE SULFATE 4 MG: 4 INJECTION, SOLUTION INTRAMUSCULAR; INTRAVENOUS at 18:30

## 2021-03-17 RX ADMIN — DEXAMETHASONE SODIUM PHOSPHATE 10 MG: 4 INJECTION, SOLUTION INTRAMUSCULAR; INTRAVENOUS at 12:49

## 2021-03-17 RX ADMIN — METFORMIN HYDROCHLORIDE 500 MG: 500 TABLET ORAL at 18:25

## 2021-03-17 RX ADMIN — SODIUM CHLORIDE: 9 INJECTION, SOLUTION INTRAVENOUS at 15:21

## 2021-03-17 RX ADMIN — HYDROMORPHONE HYDROCHLORIDE 0.5 MG: 1 INJECTION, SOLUTION INTRAMUSCULAR; INTRAVENOUS; SUBCUTANEOUS at 14:31

## 2021-03-17 RX ADMIN — PHENYLEPHRINE HYDROCHLORIDE 100 MCG: 10 INJECTION INTRAVENOUS at 13:51

## 2021-03-17 RX ADMIN — AMPICILLIN SODIUM AND SULBACTAM SODIUM 3 G: 1; .5 INJECTION, POWDER, FOR SOLUTION INTRAMUSCULAR; INTRAVENOUS at 12:17

## 2021-03-17 RX ADMIN — ONDANSETRON 4 MG: 2 INJECTION INTRAMUSCULAR; INTRAVENOUS at 17:05

## 2021-03-17 RX ADMIN — BUDESONIDE AND FORMOTEROL FUMARATE DIHYDRATE 2 PUFF: 80; 4.5 AEROSOL RESPIRATORY (INHALATION) at 20:21

## 2021-03-17 RX ADMIN — LEVOTHYROXINE SODIUM 50 MCG: 0.05 TABLET ORAL at 18:25

## 2021-03-17 RX ADMIN — Medication 10 ML: at 20:39

## 2021-03-17 RX ADMIN — SODIUM CHLORIDE: 9 INJECTION, SOLUTION INTRAVENOUS at 10:21

## 2021-03-17 ASSESSMENT — PULMONARY FUNCTION TESTS
PIF_VALUE: 24
PIF_VALUE: 25
PIF_VALUE: 25
PIF_VALUE: 23
PIF_VALUE: 25
PIF_VALUE: 22
PIF_VALUE: 25
PIF_VALUE: 3
PIF_VALUE: 42
PIF_VALUE: 25
PIF_VALUE: 25
PIF_VALUE: 19
PIF_VALUE: 21
PIF_VALUE: 25
PIF_VALUE: 22
PIF_VALUE: 25
PIF_VALUE: 25
PIF_VALUE: 22
PIF_VALUE: 25
PIF_VALUE: 23
PIF_VALUE: 25
PIF_VALUE: 22
PIF_VALUE: 25
PIF_VALUE: 23
PIF_VALUE: 25
PIF_VALUE: 15
PIF_VALUE: 25
PIF_VALUE: 17
PIF_VALUE: 25
PIF_VALUE: 22
PIF_VALUE: 25
PIF_VALUE: 26
PIF_VALUE: 23
PIF_VALUE: 25
PIF_VALUE: 17
PIF_VALUE: 25
PIF_VALUE: 6
PIF_VALUE: 22
PIF_VALUE: 25
PIF_VALUE: 25
PIF_VALUE: 22
PIF_VALUE: 22
PIF_VALUE: 25
PIF_VALUE: 25
PIF_VALUE: 22
PIF_VALUE: 19
PIF_VALUE: 25
PIF_VALUE: 25
PIF_VALUE: 19
PIF_VALUE: 22
PIF_VALUE: 25
PIF_VALUE: 34
PIF_VALUE: 19
PIF_VALUE: 23
PIF_VALUE: 25
PIF_VALUE: 23
PIF_VALUE: 25
PIF_VALUE: 5
PIF_VALUE: 22
PIF_VALUE: 25
PIF_VALUE: 26
PIF_VALUE: 22
PIF_VALUE: 25
PIF_VALUE: 22
PIF_VALUE: 0
PIF_VALUE: 25
PIF_VALUE: 25
PIF_VALUE: 19
PIF_VALUE: 25
PIF_VALUE: 11
PIF_VALUE: 29
PIF_VALUE: 24
PIF_VALUE: 23
PIF_VALUE: 25
PIF_VALUE: 6
PIF_VALUE: 25
PIF_VALUE: 25
PIF_VALUE: 19
PIF_VALUE: 23
PIF_VALUE: 35
PIF_VALUE: 22
PIF_VALUE: 26
PIF_VALUE: 25
PIF_VALUE: 17
PIF_VALUE: 25
PIF_VALUE: 4
PIF_VALUE: 25
PIF_VALUE: 34
PIF_VALUE: 23
PIF_VALUE: 25
PIF_VALUE: 22
PIF_VALUE: 25
PIF_VALUE: 4
PIF_VALUE: 25
PIF_VALUE: 2
PIF_VALUE: 19
PIF_VALUE: 25
PIF_VALUE: 22
PIF_VALUE: 25
PIF_VALUE: 26
PIF_VALUE: 25
PIF_VALUE: 22
PIF_VALUE: 16
PIF_VALUE: 25
PIF_VALUE: 23
PIF_VALUE: 22
PIF_VALUE: 25
PIF_VALUE: 25
PIF_VALUE: 23
PIF_VALUE: 25
PIF_VALUE: 25
PIF_VALUE: 19
PIF_VALUE: 25
PIF_VALUE: 4
PIF_VALUE: 19
PIF_VALUE: 23
PIF_VALUE: 25
PIF_VALUE: 22
PIF_VALUE: 23
PIF_VALUE: 25
PIF_VALUE: 23
PIF_VALUE: 27
PIF_VALUE: 15
PIF_VALUE: 25
PIF_VALUE: 25
PIF_VALUE: 22
PIF_VALUE: 23
PIF_VALUE: 25

## 2021-03-17 ASSESSMENT — PAIN SCALES - GENERAL
PAINLEVEL_OUTOF10: 4
PAINLEVEL_OUTOF10: 0
PAINLEVEL_OUTOF10: 7

## 2021-03-17 ASSESSMENT — PAIN DESCRIPTION - LOCATION
LOCATION: NECK
LOCATION: NECK

## 2021-03-17 ASSESSMENT — PAIN DESCRIPTION - ONSET
ONSET: ON-GOING
ONSET: ON-GOING
ONSET: UNABLE TO TELL

## 2021-03-17 ASSESSMENT — PAIN - FUNCTIONAL ASSESSMENT
PAIN_FUNCTIONAL_ASSESSMENT: ACTIVITIES ARE NOT PREVENTED
PAIN_FUNCTIONAL_ASSESSMENT: 0-10
PAIN_FUNCTIONAL_ASSESSMENT: ACTIVITIES ARE NOT PREVENTED

## 2021-03-17 ASSESSMENT — LIFESTYLE VARIABLES: SMOKING_STATUS: 1

## 2021-03-17 ASSESSMENT — PAIN DESCRIPTION - PAIN TYPE: TYPE: SURGICAL PAIN

## 2021-03-17 ASSESSMENT — PAIN DESCRIPTION - DESCRIPTORS
DESCRIPTORS: DISCOMFORT
DESCRIPTORS: DISCOMFORT

## 2021-03-17 ASSESSMENT — PAIN DESCRIPTION - FREQUENCY
FREQUENCY: CONTINUOUS
FREQUENCY: CONTINUOUS

## 2021-03-17 ASSESSMENT — PAIN DESCRIPTION - PROGRESSION: CLINICAL_PROGRESSION: NOT CHANGED

## 2021-03-17 NOTE — CONSULTS
Hospital Medicine  Consult History & Physical        Chief Complaint:  Oropharyngeal squamous cell carcinoma      Date of Service: Pt seen/examined in consultation on 3/17/2021    History Of Present Illness:      72 y.o. female who we are asked to see/evaluate by Marisela Grider MD for medical management of chd, cad, copd on 2L o2 at baseline, pvd. She is s/p excision of soft palate tumor by Dr Yashira Mccoy, admitted to the icu post op for observation. She recovered well in post op. She is on 10 L vapotherm now, 100%.     Past Medical History:        Diagnosis Date    Anxiety and depression     Asthma     [facility] ([facility]     CAD (coronary artery disease)     Cancer ([facility]     oral cancer    CHF (congestive heart failure) (HCC)     COPD (chronic obstructive pulmonary disease) (HCC)     Diabetes mellitus ([facility]     GERD (gastroesophageal reflux disease)     Hyperlipidemia     Hypertension     Hypothyroidism     Morbid obesity with BMI of 40.0-44.9, adult ([facility] 2019    On home O2     2 Liters @ night    Sleep apnea     does not use Cpap machine    Thyroid disease     Type 2 diabetes mellitus without complication ([facility]        Past Surgical History:        Procedure Laterality Date    ADENOIDECTOMY       SECTION      x3    CORONARY ANGIOPLASTY WITH STENT PLACEMENT      EYE SURGERY Right     laser    FINGER TRIGGER RELEASE Bilateral     HERNIA REPAIR      umbilical--X 2    HYSTERECTOMY      complete--d/t fibroid tumors    LARYNGOSCOPY N/A 2021    DIRECT LARYNGOSCOPY performed by Marisela Grider MD at [facility] 5265 N/A 2021    ORAL PHARYNGEAL BIOPSY performed by Marisela Grider MD at [facility] 96 N/A 2021    NASAL ENDOSCOPY performed by Marisela Grider MD at [facility] 79.         Medications Prior to Admission:    Prior to Admission medications    Medication Sig Start Date End Date Taking?  Authorizing Provider   hydrOXYzine (ATARAX) 10 MG tablet Take 10 mg by mouth 3 times daily as needed for Itching   Yes Historical Provider, MD   furosemide (LASIX) 40 MG tablet TAKE ONE (1) TABLET BY MOUTH TWICE A DAY   Patient taking differently: Take 40 mg by mouth daily  3/12/21  Yes Amanda Aguiar MD   amiodarone (CORDARONE) 200 MG tablet Take 1 tablet by mouth daily 3/2/21  Yes Nhung Vernon MD   albuterol sulfate  (90 Base) MCG/ACT inhaler INHALE 2 PUFFS EVERY 6 HOURS AS NEEDED FOR WHEEZING 3/2/21  Yes Nhung Vernon MD   budesonide-formoterol (SYMBICORT) 80-4.5 MCG/ACT AERO Inhale 2 puffs into the lungs 2 times daily 3/2/21  Yes Nhung Vernon MD   apixaban (ELIQUIS) 5 MG TABS tablet Take 1 tablet by mouth 2 times daily 3/2/21  Yes Nhung Vernon MD   traZODone (DESYREL) 50 MG tablet Take 1 tablet by mouth as needed for Sleep 3/2/21  Yes Nhung Vernon MD   metFORMIN (GLUCOPHAGE) 1000 MG tablet Take 1 tablet by mouth 2 times daily (with meals)  Patient taking differently: Take 500 mg by mouth 2 times daily (with meals)  3/2/21  Yes Nhung Vernon MD   carvedilol (COREG) 12.5 MG tablet Take 1 tablet by mouth 2 times daily (with meals) 3/2/21  Yes Nhung Vernon MD   levothyroxine (SYNTHROID) 50 MCG tablet Take 1 tablet by mouth Daily 3/2/21  Yes Nhung Vernon MD   famotidine (PEPCID) 20 MG tablet Take 1 tablet by mouth 2 times daily 3/2/21  Yes Nhung Vernon MD   nicotine (NICODERM CQ) 14 MG/24HR Place 1 patch onto the skin daily 3/2/21  Yes Nhung Vernon MD   tiotropium (SPIRIVA RESPIMAT) 2.5 MCG/ACT AERS inhaler Inhale 2 puffs into the lungs daily 3/2/21  Yes Nhung Vernon MD   Spacer/Aero-Holding Chambers (E-Z SPACER) ALBA 1 Device by Does not apply route daily 1/29/19  Yes Katie Morin MD   aspirin 81 MG chewable tablet Take 1 tablet by mouth daily 7/24/18  Yes Amanda Aguiar MD       Allergies:  Codeine, Oxycodone-acetaminophen, Tramadol, and Hydrocodone-acetaminophen    Social History:     TOBACCO:   reports that she quit smoking about 17 months ago. Her smoking use included cigarettes. She has a 56.25 pack-year smoking history. She has never used smokeless tobacco.  ETOH:   reports no history of alcohol use. Family History:     Positive as follows:        Problem Relation Age of Onset    Heart Disease Mother     High Blood Pressure Mother     Diabetes Mother     Heart Disease Father     High Blood Pressure Father     Diabetes Father        REVIEW OF SYSTEMS:   Pertinent positives as noted in the HPI. All other systems reviewed and negative. PHYSICAL EXAM PERFORMED:  /78   Pulse 76   Temp 98.3 °F (36.8 °C) (Axillary)   Resp 14   Ht 4' 11\" (1.499 m)   Wt 225 lb 15.5 oz (102.5 kg)   SpO2 99%   BMI 45.64 kg/m²   General appearance: No apparent distress, appears stated age and cooperative. Morbidly obese  HEENT: Normal cephalic, atraumatic without obvious deformity. Pupils equal, round, and reactive to light. Extra ocular muscles intact. Conjunctivae/corneas clear. Neck: Supple, with full range of motion. No jugular venous distention. brennen drain in place with a moderate amount of clear serosanguinous fluid  Respiratory:  Normal respiratory effort. Clear to auscultation, bilaterally without  Rales/Wheezes/Rhonchi. Cardiovascular: Regular rate and rhythm with normal S1/S2 without murmurs, rubs or gallops. Abdomen: Soft, non-tender, non-distended with normal bowel sounds. Musculoskeletal: No clubbing, cyanosis or edema bilaterally. Skin: Skin color, texture, turgor normal.  No rashes or lesions. Neurologic:  Neurovascularly intact without any focal sensory/motor deficits.  Cranial nerves: II-XII intact, grossly non-focal.  Psychiatric: Alert and oriented, thought content appropriate, normal insight  Capillary Refill: Brisk,< 3 seconds   Peripheral Pulses: +2 palpable, equal bilaterally Labs:     No results for input(s): WBC, HGB, HCT, PLT in the last 72 hours. No results for input(s): NA, K, CL, CO2, BUN, CREATININE, CALCIUM, PHOS in the last 72 hours. Invalid input(s): SERA  No results for input(s): AST, ALT, BILIDIR, BILITOT, ALKPHOS in the last 72 hours. No results for input(s): INR in the last 72 hours. No results for input(s): Huey Valdez in the last 72 hours. Urinalysis:    Lab Results   Component Value Date    NITRU Negative 02/25/2021    WBCUA 0-2 03/04/2017    RBCUA 0-2 03/04/2017    BLOODU Negative 02/25/2021    SPECGRAV 1.018 02/25/2021    GLUCOSEU Negative 02/25/2021       Radiology: I have reviewed the radiology reports with the following interpretation:     XR CHEST 1 VIEW    (Results Pending)          EKG:  I have reviewed the EKG with the following interpretation:    @ekgread@      ASSESSMENT:    Active Hospital Problems    Diagnosis Date Noted    Cancer of palate ([facility] [C05.9] 03/17/2021    Status post dissection of neck [Z98.890] 03/17/2021       PLAN:    bl freeman   CAD  - s/p pci 2016  - cont BB  - not on a statin?  Will restart  - resume asa per ENT    PAF/aflutter    - currently in nsr  - continue bb  - restart eliquis per ENT    Copd  - stable  - cont home inhalers    H/o Ischemic cardiomyopathy  - echo 2/2021, lvef 19%, normal diastolic function   - cont home meds  - decrease ivf to 75 mL/hr, will hopefully be able to stop tomorrow if able to take po    htn  - bp stable  - cont home meds    DVT Prophylaxis: lovenox tomorrow, scds for now per primary   Diet: Diet NPO, After Midnight Exceptions are: Sips of Water with Meds  Code Status: Full Code    PT/OT Eval Status: Active and ongoing    Dispo - in pt    Thank you for the consultation, will follow up as needed    Electronically signed by Woodrow South DO on 3/17/21 at 5:58 PM EDT

## 2021-03-17 NOTE — OP NOTE
[address] [facility] SURGERY  OPERATIVE REPORT    Patient Name: Krissy Gonzalez  YOB: 1955  Medical Record Number:  [id number]  Billing Number:  [id number]  Date of Procedure: [unfilled]  Time: 1200    Pre Operative Diagnoses:   Problem List Items Addressed This Visit     None      Visit Diagnoses     [facility] ([facility]        Relevant Medications    fentaNYL (SUBLIMAZE) injection 25 mcg    HYDROmorphone (DILAUDID) injection 0.5 mg    fentaNYL (SUBLIMAZE) injection 25 mcg    HYDROmorphone (DILAUDID) injection 0.5 mg    Other Relevant Orders    Surgical Pathology    Surgical Pathology    Surgical Pathology        Post Operative Diagnoses:    Problem List Items Addressed This Visit     None      Visit Diagnoses     Oropharyngeal cancer ([facility]        Relevant Medications    fentaNYL (SUBLIMAZE) injection 25 mcg    HYDROmorphone (DILAUDID) injection 0.5 mg    fentaNYL (SUBLIMAZE) injection 25 mcg    HYDROmorphone (DILAUDID) injection 0.5 mg    Other Relevant Orders    Surgical Pathology    Surgical Pathology    Surgical Pathology                 Procedure: Excision of soft palate tumor (78026)  Left selective neck dissection (38893)       Surgeon: Ranjan Driver MD    OR Staff/ Assistant:  Circulator: Romy Carranza RN  Surgical Assistant: Timmy Catherine Circulator: Ad Rutledge RN  Relief Scrub: Kerri Robledo  Scrub Person First: Karsten Fortune  Circulator Orientation: Gerald Leung RN    Anesthesia:  General anesthesia. Findings:  1) 2.1 cm relatively circular ulcerative lesion centered on the posterior aspect of the soft palate on the left side. Frozen margins were negative    Successful level 2 through 4 neck dissection on the left side    Indications: This is a 72 y.o. 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   After consulting with radiation oncology, oncology and the patient we felt left upfront surgery was likely best indicated for her. Risks and benefits discussed with the patient including alternate treatment options, Informed consent was obtained, the patient elected to proceed with the planned procedure. DETAILS OF PROCEDURE(S):   The patient was brought to the operating room placed in supine position the operating table. She underwent uncomplicated general anesthesia with endotracheal intubation. Head of bed was turned 90 degrees. She was prepped and draped. A Ed Dorna Sos was placed and open. This revealed the ulcerative lesion that was on the posterior aspect of the soft palate on the left side. An incision was made with a cuff of normal tissue for margin. The incision included about half of the uvula, a large portion of the left soft palate, the left anterior tonsillar pillar. There was a little bit of parapharyngeal fat noted on the posterior inferior aspect of the dissection. Also included a cuff of the nasal mucosa. The lesion was marked with a suture on the mucosa and the part of the uvula that was removed as well as another suture in the area of the anterior tonsillar pillar that was resected. I oriented the specimen with the pathologist.  Frozen specimens were taken of the mucosal surfaces that appear to be the closest as well as the deep surface. These appear to be at least 2-1/2 to 3 mm clear. The defect was relatively large, but the shape of it did not allow much primary closure. Bipolar was used to obtain hemostasis. The wound was copiously irrigated. An NG tube was placed and secured around the septum with an umbilical tape. Attention was then turned to the left neck. An incision was mapped in a horizontal neck crease between the midline of the neck extending up toward the mastoid tip on the left side. 10 mL 1% lidocaine with epinephrine was injected. She was prepped and draped in a sterile fashion.     The neck incision was made through the platysma. The external jugular vein was small and ligated. The sternocleidomastoid was skeletonized. The omohyoid was identified inferiorly and ligated. Superiorly the spinal accessory nerve was identified and dissection was carried out along it to identify the posterior belly of digastric. Dissection was then carried along the posterior belly of digastric. Next dissection was carried out deep in the neck and the cervical rootlets were identified and used to basilio the depth of the dissection. Inferiorly dissection was carried out to the area just below the clavicles. This area was clamped and ligated to help prevent a chyle leak. Next the medial aspect of the dissection was marked as the lateral border of the strap muscles. The bryce tissue as well as a significant amount of adipose tissue was reflected off of the jugular vein. The common facial vein was ligated. Several small veins off the internal jugular vein were ligated. The fohsho5T through 4 were included in a single specimen. Next level 2A was removed. Occipital artery was bipolared. This was sent as a separate specimen    The wound was then copiously irrigated. There was a little bit of chyle noted in the wound. An expiration showed a dilated duct which was controlled with surgical clips. Tisseel was then placed. This seemed to minimize any chyle that was showing. The wound was irrigated and hemostasis was obtained. 2 MINO drains were placed. The lateral MINO drain was brought to the skin and the sternocleidomastoid and placed in the deep neck where the lymph nodes were removed. Another more medially placed MINO drain was placed more superficially to hold down the skin. These were both secured with a 2-0 silk suture. The incision was then closed with simple interrupted 3-0 Vicryl sutures in the platysma. The deep dermis was closed with simple interrupted 4-0 Vicryl sutures.   The skin was closed with a running locking 5-0 fast.    Bacitracin was then applied. The oral cavity was examined and there was no evidence of bleeding. The patient was let awaken, extubated taken recovery stable condition. I attest that I was present for and did the entire procedure myself. Estimated Blood Loss: 100 mL                 Specimens:   ID Type Source Tests Collected by Time Destination   A : A  LEFT PALATE Tissue Tissue SURGICAL PATHOLOGY Ranjan Driver MD 3/17/2021 1255    B : b) left neck level 2A 3 and 4 Tissue Tissue 9250 Doug Ramos MD 3/17/2021 1621    C : c) left neck level 2B Tissue Tissue 9250 Doug Ramos MD 3/17/2021 1625               Drains: There are 2 left neck MINO drains     Complications: There were no complications.     Ranjan Driver

## 2021-03-17 NOTE — ANESTHESIA PRE PROCEDURE
[facility] Department of Anesthesiology  Pre-Anesthesia Evaluation/Consultation       Name:  Marley Dias  : 1955  Age:  72 y.o.                                            MRN:  [id number]  Date: 3/17/2021           Surgeon: Surgeon(s):  Lili Nicely, MD Delight Halsted, MD Leon Neighbor, MD    Procedure: Procedure(s):  EXCISION OF PALATE LESION  LEFT NECK DISSECTION     Allergies   Allergen Reactions    Codeine Itching, Nausea And Vomiting, Rash and Other (See Comments)    Oxycodone-Acetaminophen Nausea And Vomiting and Rash    Tramadol Nausea And Vomiting    Hydrocodone-Acetaminophen Nausea And Vomiting     Patient Active Problem List   Diagnosis    Atypical chest pain    COPD exacerbation ([address]    Coronary artery disease involving native coronary artery of native heart without angina pectoris    Type 2 diabetes mellitus ([address]    Essential hypertension    Pure hypercholesterolemia    Acquired hypothyroidism    Ischemic heart disease due to coronary artery obstruction ([address]    Panlobular emphysema ([address]    Moderate episode of recurrent major depressive disorder ([address]    Obstructive sleep apnea syndrome    H/O total hysterectomy    Tobacco abuse    Morbid obesity with BMI of 40.0-44.9, adult ([address]    Acute on chronic heart failure with normal ejection fraction (HCC)    Acute on chronic heart failure (HCC)    Suspected sleep apnea    Nicotine dependence    Mixed hyperlipidemia    Light headed    Atrial flutter ([address]    Preop examination    Chronic obstructive pulmonary disease ([address]    Tobacco use     Past Medical History:   Diagnosis Date    Anxiety and depression     Asthma     Atrial fibrillation ([address]     CAD (coronary artery disease)     Cancer ([address]     oral cancer    CHF (congestive heart failure) ([address]     COPD (chronic obstructive pulmonary disease) ([address]     Diabetes mellitus (HCC)     GERD (gastroesophageal reflux disease)     Hyperlipidemia     Hypertension     Hypothyroidism     Morbid obesity with BMI of 40.0-44.9, adult ([facility] 2019    On home O2     2 Liters @ night    Sleep apnea     does not use Cpap machine    Thyroid disease     Type 2 diabetes mellitus without complication ([facility]      Past Surgical History:   Procedure Laterality Date    ADENOIDECTOMY       SECTION      x3    CORONARY ANGIOPLASTY WITH STENT PLACEMENT      EYE SURGERY Right     laser    FINGER TRIGGER RELEASE Bilateral     HERNIA REPAIR      umbilical--X 2    HYSTERECTOMY      complete--d/t fibroid tumors    LARYNGOSCOPY N/A 2021    DIRECT LARYNGOSCOPY performed by Joni Hurtado MD at [facility] 5265 N/A 2021    ORAL PHARYNGEAL BIOPSY performed by Joni Hurtado MD at [facility] 96 N/A 2021    NASAL ENDOSCOPY performed by Joni Hurtado MD at [facility].       Social History     Tobacco Use    Smoking status: Former Smoker     Packs/day: 1.25     Years: 45.00     Pack years: 56.25     Types: Cigarettes     Quit date: 2019     Years since quittin.4    Smokeless tobacco: Never Used   Substance Use Topics    Alcohol use: No    Drug use: Never     Medications  Current Facility-Administered Medications on File Prior to Visit   Medication Dose Route Frequency Provider Last Rate Last Admin    0.9 % sodium chloride infusion   Intravenous Continuous Caroline Bartlett  mL/hr at 21 1021 New Bag at 21 1021    sodium chloride flush 0.9 % injection 10 mL  10 mL Intravenous 2 times per day Caroline Bartlett MD        sodium chloride flush 0.9 % injection 10 mL  10 mL Intravenous PRN Caroline Bartlett MD         Current Outpatient Medications on File Prior to Visit   Medication Sig Dispense Refill    hydrOXYzine (ATARAX) 10 MG tablet Take 10 mg by mouth 3 times daily as needed for Itching      furosemide (LASIX) 40 MG tablet TAKE ONE (1) TABLET BY MOUTH TWICE A DAY  (Patient taking differently: Take 40 mg by mouth daily ) 60 tablet 5    amiodarone (CORDARONE) 200 MG tablet Take 1 tablet by mouth daily 30 tablet 1    albuterol sulfate  (90 Base) MCG/ACT inhaler INHALE 2 PUFFS EVERY 6 HOURS AS NEEDED FOR WHEEZING 1 Inhaler 11    budesonide-formoterol (SYMBICORT) 80-4.5 MCG/ACT AERO Inhale 2 puffs into the lungs 2 times daily 1 Inhaler 11    apixaban (ELIQUIS) 5 MG TABS tablet Take 1 tablet by mouth 2 times daily 60 tablet 2    traZODone (DESYREL) 50 MG tablet Take 1 tablet by mouth as needed for Sleep 30 tablet 1    metFORMIN (GLUCOPHAGE) 1000 MG tablet Take 1 tablet by mouth 2 times daily (with meals) (Patient taking differently: Take 500 mg by mouth 2 times daily (with meals) ) 180 tablet 3    carvedilol (COREG) 12.5 MG tablet Take 1 tablet by mouth 2 times daily (with meals) 60 tablet 1    levothyroxine (SYNTHROID) 50 MCG tablet Take 1 tablet by mouth Daily 30 tablet 11    famotidine (PEPCID) 20 MG tablet Take 1 tablet by mouth 2 times daily 60 tablet 5    nicotine (NICODERM CQ) 14 MG/24HR Place 1 patch onto the skin daily 30 patch 2    tiotropium (SPIRIVA RESPIMAT) 2.5 MCG/ACT AERS inhaler Inhale 2 puffs into the lungs daily 1 Inhaler 6    Spacer/Aero-Holding Chambers (E-Z SPACER) ALBA 1 Device by Does not apply route daily 1 Device 0    aspirin 81 MG chewable tablet Take 1 tablet by mouth daily 90 tablet 2     No current facility-administered medications for this visit. No current outpatient medications on file.      Facility-Administered Medications Ordered in Other Visits   Medication Dose Route Frequency Provider Last Rate Last Admin    0.9 % sodium chloride infusion   Intravenous Continuous Ligia Blackwood  mL/hr at 03/17/21 1021 New Bag at 03/17/21 1021    sodium chloride flush 0.9 % injection 10 mL  10 mL Intravenous 2 times per day Ligia Blackwood MD        sodium chloride flush 0.9 % injection 10 mL  10 mL Intravenous PRN Catracho Palomo MD         Vital Signs (Current)   There were no vitals filed for this visit. BP Readings from Last 3 Encounters:   21 120/78   21 95/72   21 (!) 180/74     Vital Signs Statistics (for past 48 hrs)     Temp  Av °F (36.1 °C)  Min: 97 °F (36.1 °C)   Min taken time: 21 1000  Max: 97 °F (36.1 °C)   Max taken time: 21 1000  Pulse  Av  Min: 73   Min taken time: 21 1000  Max: 68   Max taken time: 21 1000  Resp  Av  Min: 15   Min taken time: 21 1000  Max: 14   Max taken time: 21 1000  BP  Min: 120/78   Min taken time: 21 1000  Max: 120/78   Max taken time: 21 1000  SpO2  Av %  Min: 95 %   Min taken time: 21 1000  Max: 95 %   Max taken time: 21 1000  BP Readings from Last 3 Encounters:   21 120/78   21 95/72   21 (!) 180/74       BMI  There is no height or weight on file to calculate BMI. Estimated body mass index is 43.64 kg/m² as calculated from the following:    Height as of 3/15/21: 4' 11\" (1.499 m). Weight as of an earlier encounter on 3/17/21: 216 lb 0.8 oz (98 kg).     CBC   Lab Results   Component Value Date    WBC 10.8 2021    RBC 4.86 2021    HGB 14.0 2021    HCT 41.5 2021    MCV 85.5 2021    RDW 13.9 2021     2021     CMP    Lab Results   Component Value Date     2021    K 4.0 2021    K 4.9 2021     2021    CO2 28 2021    BUN 22 2021    CREATININE 0.8 2021    GFRAA >60 2021    AGRATIO 1.0 2021    LABGLOM >60 2021    GLUCOSE 99 2021    PROT 7.1 2021    CALCIUM 9.4 2021    BILITOT 0.4 2021    ALKPHOS 82 2021    AST 11 2021    ALT 9 2021     BMP    Lab Results   Component Value Date     2021    K 4.0 2021    K 4.9 02/22/2021     03/02/2021    CO2 28 03/02/2021    BUN 22 03/02/2021    CREATININE 0.8 03/02/2021    CALCIUM 9.4 03/02/2021    GFRAA >60 03/02/2021    LABGLOM >60 03/02/2021    GLUCOSE 99 03/02/2021     POCGlucose  No results for input(s): GLUCOSE in the last 72 hours. Coags  No results found for: PROTIME, INR, APTT  HCG (If Applicable) No results found for: PREGTESTUR, PREGSERUM, HCG, HCGQUANT   ABGs No results found for: PHART, PO2ART, TCN5CZJ, LOK3VMJ, BEART, P0OAHBYP   Type & Screen (If Applicable)  No results found for: LABABO, LABRH                         BMI: Wt Readings from Last 3 Encounters:       NPO Status:                          Anesthesia Evaluation  Patient summary reviewed no history of anesthetic complications:   Airway: Mallampati: I        Dental:    (+) edentulous      Pulmonary:   (+) COPD:  sleep apnea:  asthma: current smoker                           Cardiovascular:    (+) hypertension:, CAD:, CHF:, hyperlipidemia    (-) pacemaker    ECG reviewed      Echocardiogram reviewed                  Neuro/Psych:   (+) psychiatric history:            GI/Hepatic/Renal:   (+) GERD:, morbid obesity     (-) no renal disease and bowel prep       Endo/Other:    (+) DiabetesType II DM, , hypothyroidism::., .    (-) hyperthyroidism               Abdominal:   (+) obese,         Vascular:     - DVT and PE. Anesthesia Plan      general     ASA 3       Induction: intravenous. arterial line  MIPS: Prophylactic antiemetics administered and Postoperative trial extubation. Anesthetic plan and risks discussed with patient. Plan discussed with CRNA. Attending anesthesiologist reviewed and agrees with Pre Eval content              This pre-anesthesia assessment may be used as a history and physical.    DOS STAFF ADDENDUM:    Pt seen and examined, chart reviewed (including anesthesia, drug and allergy history).   No interval changes to history and physical examination. Anesthetic plan, risks, benefits, alternatives, and personnel involved discussed with patient. Patient verbalized an understanding and agrees to proceed.       Kaila Og MD  March 17, 2021  10:40 AM

## 2021-03-17 NOTE — ANESTHESIA POSTPROCEDURE EVALUATION
Department of Anesthesiology  Postprocedure Note    Patient: Marleen Mccallum  MRN: [id number]  YOB: 1955  Date of evaluation: 3/17/2021  Time:  5:47 PM     Procedure Summary     Date: 03/17/21 Room / Location: [address]    Anesthesia Start: 1207 Anesthesia Stop: 6944    Procedures:       EXCISION OF PALATE LESION (N/A Mouth)      LEFT NECK DISSECTION (Left Neck) Diagnosis:       Oropharyngeal cancer ([facility] [address]      (ORAL PHARYNGEAL CANCER)    Surgeons: Kathy Prasad MD Responsible Provider: Kaila Og MD    Anesthesia Type: general ASA Status: 3          Anesthesia Type: general    Flip Phase I: Flip Score: 10    Flip Phase II:      Last vitals: Reviewed and per EMR flowsheets.        Anesthesia Post Evaluation    Patient location during evaluation: bedside  Patient participation: complete - patient participated  Level of consciousness: awake  Pain score: 2  Airway patency: patent  Nausea & Vomiting: no nausea and no vomiting  Complications: no  Cardiovascular status: blood pressure returned to baseline  Respiratory status: acceptable  Hydration status: euvolemic

## 2021-03-17 NOTE — H&P
I have reviewed this patient's history and physical and there have been no significant changes. The patient was counseled at length about the risks of funmi Covid-19 during their perioperative period and any recovery window from their procedure. The patient was made aware that funmi Covid-19  may worsen their prognosis for recovering from their procedure  and lend to a higher morbidity and/or mortality risk. All material risks, benefits, and reasonable alternatives including postponing the procedure were discussed. The patient does wish to proceed with the procedure at this time. She understands the risk of palate resection including post operating pain, bleeding, VPI and recurrence. She understands the risk of a neck dissection including bleeding, damage to accessory nerve, chyle leak, infection and need for additional procedures. She understands that secondary to her medical issues she has a higher risk of medical complications and perioperative mortality. She agrees to proceed.

## 2021-03-17 NOTE — ANESTHESIA PROCEDURE NOTES
Arterial Line:    An arterial line was placed using ultrasound guidance, in the holding area for the following indication(s): continuous blood pressure monitoring and blood sampling needed. A 20 gauge (size), 1 and 3/4 inch (length), Arrow (type) catheter was placed, Seldinger technique not used, into the left radial artery, secured by Tegaderm and tape. Anesthesia type: Local  Local infiltration: Injection    Events:  patient tolerated procedure well with no complications and EBL < 5mL. Additional notes:  No blood loss.    3/17/2021 11:50 AM3/17/2021 11:57 AM  Anesthesiologist: Magdalena Carlisle MD  Resident/CRNA: Tianna Watkins APRN - CRNA  Other anesthesia staff: Christian Villeda RN  Performed: Anesthesiologist   Preanesthetic Checklist  Completed: patient identified, IV checked, site marked, risks and benefits discussed, surgical consent, monitors and equipment checked, pre-op evaluation, timeout performed, anesthesia consent given, oxygen available and patient being monitored

## 2021-03-18 LAB
ANION GAP SERPL CALCULATED.3IONS-SCNC: 7 MMOL/L (ref 3–16)
BUN BLDV-MCNC: 17 MG/DL (ref 7–20)
CALCIUM SERPL-MCNC: 8 MG/DL (ref 8.3–10.6)
CHLORIDE BLD-SCNC: 108 MMOL/L (ref 99–110)
CO2: 26 MMOL/L (ref 21–32)
CREAT SERPL-MCNC: 0.6 MG/DL (ref 0.6–1.2)
GFR AFRICAN AMERICAN: >60
GFR NON-AFRICAN AMERICAN: >60
GLUCOSE BLD-MCNC: 125 MG/DL (ref 70–99)
GLUCOSE BLD-MCNC: 139 MG/DL (ref 70–99)
HCT VFR BLD CALC: 36.6 % (ref 36–48)
HEMOGLOBIN: 12 G/DL (ref 12–16)
MAGNESIUM: 1.7 MG/DL (ref 1.8–2.4)
MCH RBC QN AUTO: 28.4 PG (ref 26–34)
MCHC RBC AUTO-ENTMCNC: 32.9 G/DL (ref 31–36)
MCV RBC AUTO: 86.5 FL (ref 80–100)
PDW BLD-RTO: 14 % (ref 12.4–15.4)
PERFORMED ON: ABNORMAL
PHOSPHORUS: 2.6 MG/DL (ref 2.5–4.9)
PLATELET # BLD: 244 K/UL (ref 135–450)
PMV BLD AUTO: 8.7 FL (ref 5–10.5)
POTASSIUM REFLEX MAGNESIUM: 4.2 MMOL/L (ref 3.5–5.1)
RBC # BLD: 4.24 M/UL (ref 4–5.2)
SODIUM BLD-SCNC: 141 MMOL/L (ref 136–145)
WBC # BLD: 12.8 K/UL (ref 4–11)

## 2021-03-18 PROCEDURE — 6370000000 HC RX 637 (ALT 250 FOR IP): Performed by: FAMILY MEDICINE

## 2021-03-18 PROCEDURE — 6360000002 HC RX W HCPCS: Performed by: OTOLARYNGOLOGY

## 2021-03-18 PROCEDURE — 80048 BASIC METABOLIC PNL TOTAL CA: CPT

## 2021-03-18 PROCEDURE — 97162 PT EVAL MOD COMPLEX 30 MIN: CPT

## 2021-03-18 PROCEDURE — 83735 ASSAY OF MAGNESIUM: CPT

## 2021-03-18 PROCEDURE — 92610 EVALUATE SWALLOWING FUNCTION: CPT

## 2021-03-18 PROCEDURE — 2700000000 HC OXYGEN THERAPY PER DAY

## 2021-03-18 PROCEDURE — 6370000000 HC RX 637 (ALT 250 FOR IP): Performed by: OTOLARYNGOLOGY

## 2021-03-18 PROCEDURE — 2500000003 HC RX 250 WO HCPCS: Performed by: OTOLARYNGOLOGY

## 2021-03-18 PROCEDURE — 84100 ASSAY OF PHOSPHORUS: CPT

## 2021-03-18 PROCEDURE — 94640 AIRWAY INHALATION TREATMENT: CPT

## 2021-03-18 PROCEDURE — 36415 COLL VENOUS BLD VENIPUNCTURE: CPT

## 2021-03-18 PROCEDURE — 97530 THERAPEUTIC ACTIVITIES: CPT

## 2021-03-18 PROCEDURE — 94761 N-INVAS EAR/PLS OXIMETRY MLT: CPT

## 2021-03-18 PROCEDURE — 85027 COMPLETE CBC AUTOMATED: CPT

## 2021-03-18 PROCEDURE — 2580000003 HC RX 258: Performed by: OTOLARYNGOLOGY

## 2021-03-18 PROCEDURE — 2060000000 HC ICU INTERMEDIATE R&B

## 2021-03-18 PROCEDURE — 37799 UNLISTED PX VASCULAR SURGERY: CPT

## 2021-03-18 RX ORDER — LANOLIN ALCOHOL/MO/W.PET/CERES
400 CREAM (GRAM) TOPICAL DAILY
Status: DISCONTINUED | OUTPATIENT
Start: 2021-03-18 | End: 2021-03-22 | Stop reason: HOSPADM

## 2021-03-18 RX ORDER — MAGNESIUM SULFATE 1 G/100ML
1000 INJECTION INTRAVENOUS ONCE
Status: COMPLETED | OUTPATIENT
Start: 2021-03-18 | End: 2021-03-18

## 2021-03-18 RX ORDER — CALCIUM GLUCONATE 20 MG/ML
1000 INJECTION, SOLUTION INTRAVENOUS ONCE
Status: COMPLETED | OUTPATIENT
Start: 2021-03-18 | End: 2021-03-18

## 2021-03-18 RX ORDER — ATORVASTATIN CALCIUM 20 MG/1
20 TABLET, FILM COATED ORAL DAILY
COMMUNITY

## 2021-03-18 RX ORDER — FUROSEMIDE 40 MG/1
40 TABLET ORAL DAILY
Status: DISCONTINUED | OUTPATIENT
Start: 2021-03-18 | End: 2021-03-20

## 2021-03-18 RX ADMIN — SODIUM CHLORIDE 3000 MG: 900 INJECTION INTRAVENOUS at 21:14

## 2021-03-18 RX ADMIN — CARVEDILOL 12.5 MG: 12.5 TABLET, FILM COATED ORAL at 17:13

## 2021-03-18 RX ADMIN — Medication 10 ML: at 21:15

## 2021-03-18 RX ADMIN — BUDESONIDE AND FORMOTEROL FUMARATE DIHYDRATE 2 PUFF: 80; 4.5 AEROSOL RESPIRATORY (INHALATION) at 20:47

## 2021-03-18 RX ADMIN — AMIODARONE HYDROCHLORIDE 200 MG: 200 TABLET ORAL at 09:25

## 2021-03-18 RX ADMIN — ALBUTEROL SULFATE 2.5 MG: 2.5 SOLUTION RESPIRATORY (INHALATION) at 09:06

## 2021-03-18 RX ADMIN — TIOTROPIUM BROMIDE INHALATION SPRAY 2 PUFF: 3.12 SPRAY, METERED RESPIRATORY (INHALATION) at 09:06

## 2021-03-18 RX ADMIN — CARVEDILOL 12.5 MG: 12.5 TABLET, FILM COATED ORAL at 09:25

## 2021-03-18 RX ADMIN — MORPHINE SULFATE 4 MG: 4 INJECTION, SOLUTION INTRAMUSCULAR; INTRAVENOUS at 01:44

## 2021-03-18 RX ADMIN — BACITRACIN ZINC: 500 OINTMENT TOPICAL at 21:15

## 2021-03-18 RX ADMIN — METFORMIN HYDROCHLORIDE 500 MG: 500 TABLET ORAL at 09:25

## 2021-03-18 RX ADMIN — CALCIUM GLUCONATE 1000 MG: 20 INJECTION, SOLUTION INTRAVENOUS at 09:26

## 2021-03-18 RX ADMIN — METFORMIN HYDROCHLORIDE 500 MG: 500 TABLET ORAL at 17:13

## 2021-03-18 RX ADMIN — MAGNESIUM SULFATE 1000 MG: 1 INJECTION INTRAVENOUS at 10:56

## 2021-03-18 RX ADMIN — FAMOTIDINE 20 MG: 20 TABLET, FILM COATED ORAL at 21:14

## 2021-03-18 RX ADMIN — FUROSEMIDE 40 MG: 40 TABLET ORAL at 14:55

## 2021-03-18 RX ADMIN — LEVOTHYROXINE SODIUM 50 MCG: 0.05 TABLET ORAL at 06:23

## 2021-03-18 RX ADMIN — BUDESONIDE AND FORMOTEROL FUMARATE DIHYDRATE 2 PUFF: 80; 4.5 AEROSOL RESPIRATORY (INHALATION) at 09:06

## 2021-03-18 RX ADMIN — TRAZODONE HYDROCHLORIDE 50 MG: 50 TABLET ORAL at 21:28

## 2021-03-18 RX ADMIN — SODIUM CHLORIDE 3000 MG: 900 INJECTION INTRAVENOUS at 14:55

## 2021-03-18 RX ADMIN — ATORVASTATIN CALCIUM 10 MG: 10 TABLET, FILM COATED ORAL at 21:14

## 2021-03-18 RX ADMIN — BACITRACIN ZINC: 500 OINTMENT TOPICAL at 06:23

## 2021-03-18 RX ADMIN — Medication 10 ML: at 15:00

## 2021-03-18 RX ADMIN — ONDANSETRON 4 MG: 2 INJECTION INTRAMUSCULAR; INTRAVENOUS at 06:44

## 2021-03-18 RX ADMIN — BACITRACIN ZINC: 500 OINTMENT TOPICAL at 14:56

## 2021-03-18 RX ADMIN — SODIUM CHLORIDE 3000 MG: 900 INJECTION INTRAVENOUS at 06:23

## 2021-03-18 RX ADMIN — FAMOTIDINE 20 MG: 20 TABLET, FILM COATED ORAL at 09:25

## 2021-03-18 ASSESSMENT — PAIN SCALES - GENERAL
PAINLEVEL_OUTOF10: 0
PAINLEVEL_OUTOF10: 8
PAINLEVEL_OUTOF10: 2
PAINLEVEL_OUTOF10: 4

## 2021-03-18 ASSESSMENT — PAIN DESCRIPTION - PROGRESSION: CLINICAL_PROGRESSION: NOT CHANGED

## 2021-03-18 ASSESSMENT — PAIN DESCRIPTION - PAIN TYPE: TYPE: ACUTE PAIN;SURGICAL PAIN

## 2021-03-18 ASSESSMENT — PAIN DESCRIPTION - DIRECTION: RADIATING_TOWARDS: NON-RADIATING

## 2021-03-18 ASSESSMENT — PAIN DESCRIPTION - DESCRIPTORS: DESCRIPTORS: BURNING;CONSTANT;DISCOMFORT

## 2021-03-18 ASSESSMENT — PAIN DESCRIPTION - LOCATION: LOCATION: NECK

## 2021-03-18 NOTE — PROGRESS NOTES
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 She is done well overnight. Says she is not having any pain. Her main concern is getting the NG tube out and eating. Exam  Patient is in no distress. the neck incision is clean dry and intact without any evidence of fluid collection. MINO drains are holding suction with serosanguineous fluid. Patient has a large eschar of the left oropharynx. No evidence of bleeding. NG tube is in place.     Patient is able to shrug her shoulder normally    Replace magnesium and calcium    Plan  Patient is doing very well for postoperative day 1  Transferring her to the stepdown unit  Advancing her diet to full liquid  Speech therapy consult for bedside swallow study and to work with her on swallowing as I feel like she is almost certain to have at least some degree of velopharyngeal insufficiency  Discontinue Fisher  Discontinue arterial line  Appreciate internal medicine recommendations and assistance

## 2021-03-18 NOTE — PROGRESS NOTES
Speech Language Pathology  Facility/Department: [address] ICU   CLINICAL BEDSIDE SWALLOW EVALUATION    NAME: Brigette Meraz  : 1955  MRN: [id number]    ADMISSION DATE: 3/17/2021  ADMITTING DIAGNOSIS: has Atypical chest pain; COPD exacerbation ([address] Coronary artery disease involving native coronary artery of native heart without angina pectoris; Type 2 diabetes mellitus ([address] Essential hypertension; Pure hypercholesterolemia; Acquired hypothyroidism; Ischemic heart disease due to coronary artery obstruction ([address] Panlobular emphysema ([address] Moderate episode of recurrent major depressive disorder ([address] Obstructive sleep apnea syndrome; H/O total hysterectomy; Tobacco abuse; Morbid obesity with BMI of 40.0-44.9, adult ([address] Acute on chronic heart failure with normal ejection fraction ([address] Acute on chronic heart failure ([address] Suspected sleep apnea; Nicotine dependence; Mixed hyperlipidemia; Light headed; Atrial flutter ([address] Preop examination; Chronic obstructive pulmonary disease ([address] Tobacco use; Cancer of palate [facility]); and Status post dissection of neck on their problem list.   PMHX: Anxiety and depression, Asthma, Atrial fibrillation ([address] CAD (coronary artery disease), Cancer ([address] CHF (congestive heart failure) ([address] COPD (chronic obstructive pulmonary disease) ([address] Diabetes mellitus ([address] GERD (gastroesophageal reflux disease), Hyperlipidemia, Hypertension, Hypothyroidism, Morbid obesity with BMI of 40.0-44.9, adult ([address] On home O2, Sleep apnea, Thyroid disease, and Type 2 diabetes mellitus without complication ([address] Past surgical history:  section; sinus surgery; Tonsillectomy; Adenoidectomy; Coronary angioplasty with stent; Mouth surgery (N/A, 2021); Nasal sinus surgery (N/A, 2021); laryngoscopy (N/A, 2021); eye surgery (Right); hernia repair; Hysterectomy; Finger trigger release (Bilateral);  Mouth surgery (N/A, 3/17/2021); and Thyroidectomy (Left, 3/17/2021). ONSET DATE: 3/17/2021    Recent Chest Xray 3/17/2021  FINDINGS:   Enteric tube with distal end reaching the gastric body noted.  Side port   projects proximal to the gastroesophageal junction.  This could be advanced   by 7 cm.  Cardiac silhouette is upper normal limits in size. Mediastinal   contours are otherwise suboptimally evaluated due to rotated projection. Minimal right basilar opacity could represent atelectasis     HPI:65 y.o. 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 Surgery for excision of soft palate tumor and left selective neck dissection completed by Dr Susana Duran on 3/17. NG tube in place; advanced to full liquid diet and SLP consulted for swallow evaluation on 3/18. Date of Eval: 3/18/2021  Evaluating Therapist: Priscilla Gauthier    Current Diet level:  Current Diet : NPO  Current Liquid Diet : Full    Primary Complaint  Patient Complaint: concern for safe PO initiation s/p soft palate and neck dissection. Ok'd for full liquids by ENT. Pt denies pain; denies difficulty swallowing. Does not want NG tube and desires to eat    Pain:  Pain Assessment  Pain Assessment: 0-10  Pain Level: 0  Patient's Stated Pain Goal: 4  Pain Type: Surgical pain  Pain Location: Neck  Pain Orientation: Left  Pain Descriptors: Discomfort  Pain Frequency: Continuous  Pain Onset: On-going  Clinical Progression: Not changed  Functional Pain Assessment: Activities are not prevented  Non-Pharmaceutical Pain Intervention(s): Rest  Response to Pain Intervention: Patient Satisfied    Reason for Referral  Olman Chua was referred for a bedside swallow evaluation to assess the efficiency of her swallow function, identify signs and symptoms of aspiration and make recommendations regarding safe dietary consistencies, effective compensatory strategies, and safe eating environment.     Impression  Dysphagia Diagnosis: Suspected needs further assessment  Dysphagia Impression : Pt alert, cooperative but impulsive, with suspected decreased safety awareness and insight re: new deficits s/p soft palate and neck surgery. Pt without any pain complaints; states she doesn't want NG tube and that she has large amounts of mucous bothering her. · Apparent oropharyngeal dysphagia, suspect oral > pharyngeal, with high likelihood for reduced  function r/t surgery, evidenced by nearly immediate regurgitation of pudding after swallow initiated. · Pt tolerated multiple initial trials of ice chips, jello, and thin via tsp with rapid oral transit, timely swallow initiation, and slightly reduced laryngeal elevation without overt s/s. · When pt trialled jello and thin water after single pudding trial, delayed regurgitation of mucous was noted to occur. · Recommend clear liquids only; avoid heavier viscosities and milk based products for now. Meds via NG.    · ST will follow for ongoing assessment and additional recommendations. Potential eventual need for MBS. Dysphagia Outcome Severity Scale: Level 2: Moderate Severe dysphagia- Maximum assistance or maximum use of strategies with partial PO only     Treatment Plan  Requires SLP Intervention: Yes  Duration/Frequency of Treatment: 3-5x/wk while on acute unit  D/C Recommendations: To be determined     Recommended Diet and Intervention  Solids: NPO  Liquid Consistency Recommendation: Clear  Recommended Form of Meds: Via alternative means of nutrition  Recommendations: Dysphagia treatment(suspect eventual benefit from Modified Barium Swallow study)  Therapeutic Interventions: Diet tolerance monitoring; Therapeutic PO trials with SLP;Patient/Family education    Compensatory Swallowing Strategies  Compensatory Swallowing Strategies: Upright as possible for all oral intake;Remain upright for 30-45 minutes after meals;Eat/Feed slowly; Small bites/sips    Treatment/Goals  Dysphagia Goals:  The patient will tolerate recommended diet without observed clinical signs of aspiration; The patient will tolerate repeat BSE when able. ;The patient/caregiver will demonstrate understanding of compensatory strategies for improved swallowing safety. General  Chart Reviewed: Yes  Behavior/Cognition: Alert; Cooperative; Impulsive  Respiratory Status: O2 via nasual cannula  Communication Observation: Functional  Follows Directions: Simple  Dentition: Edentulous  Patient Positioning: Upright in chair  Baseline Vocal Quality: Hoarse  Volitional Cough: Wet  Volitional Swallow: Delayed  Consistencies Administered: Dysphagia Pureed (Dysphagia I); Thin - teaspoon; Ice Chips; Thin - cup     Vision/Hearing  Vision  Vision: Impaired  Vision Exceptions: (Blurry vision L eye)  Hearing  Hearing: Within functional limits    Oral Motor Deficits  Oral/Motor  Oral Motor: Exceptions to [facility]  Velum: (left asymmetrical s/p resection; partial view only 2/2 pt gagging)  Gag: Unable to assess(hypersensitive to tongue blade to medial lingual surface resulting in gagging and expectoration of mucous)    Oral Phase Dysfunction  Oral Phase  Oral Phase: Exceptions  Oral Phase  Oral Phase - Comment: with limited items assessed; high likelihood for reduced  function r/t surgery, evidenced by nearly immediate regurgitation of pudding after swallow initiated     Indicators of Pharyngeal Phase Dysfunction   Pharyngeal Phase  Pharyngeal Phase: Exceptions  Indicators of Pharyngeal Phase Dysfunction  Decreased Laryngeal Elevation: All  Pharyngeal Phase   Pharyngeal: no cough; throat clear; VQ change    Prognosis  Prognosis  Prognosis for safe diet advancement: good  Individuals consulted  Consulted and agree with results and recommendations: Patient;RN    Education  Patient Education: results, recommendations  Patient Education Response: Verbalizes understanding;Needs reinforcement        Therapy Time  SLP Individual Minutes  Time In: 1425  Time Out: 1515  Minutes: 50     This note serves as a D/C Summary in the event that this patient is discharged prior to the next therapy session.     Donald Perdomo MS, CCC-SLP #[id number]  Speech Language Pathologist  3/18/2021 3:27 PM

## 2021-03-18 NOTE — PROGRESS NOTES
4 Eyes Skin Assessment     NAME:  Olman Chua  YOB: 1955  MEDICAL RECORD NUMBER:  [id number]    The patient is being assess for  Transfer to New Unit    I agree that 2 RN's have performed a thorough Head to Toe Skin Assessment on the patient. ALL assessment sites listed below have been assessed. Areas assessed by both nurses:    Head, Face, Ears, Shoulders, Back, Chest, Arms, Elbows, Hands, Sacrum. Buttock, Coccyx, Ischium and Legs. Feet and Heels        Does the Patient have a Wound?  Other surgical incisions/drains       Naresh Prevention initiated:  Yes   Wound Care Orders initiated:  Yes    Pressure Injury (Stage 3,4, Unstageable, DTI, NWPT, and Complex wounds) if present place consult order under [de-identified] No    New and Established Ostomies if present place consult order under : No      Nurse 1 eSignature: Electronically signed by Sharmin Quintana RN on 3/18/21 at 6:37 PM EDT    **SHARE this note so that the co-signing nurse is able to place an eSignature**    Nurse 2 eSignature: Electronically signed by Gilmar Parish RN on 3/18/21 at 6:37 PM EDT

## 2021-03-18 NOTE — PROGRESS NOTES
Medication Reconciliation    List of medications patient is currently taking is complete. Source of information: 1. Conversation with patient at bedside                                      2. EPIC records      Allergies  Codeine, Oxycodone-acetaminophen, Tramadol, and Hydrocodone-acetaminophen     Notes regarding home medications:     Medications Added:          - Atorvastatin 20 mg daily    2.  Patient prescribed metformin, however, she states that she is noncompliant    Kimberly Purdy  PharmD Candidate [id number]

## 2021-03-18 NOTE — CARE COORDINATION
INITIAL CASE MANAGEMENT ASSESSMENT    Reviewed chart, met with patient to assess possible discharge needs. Explained Case Management role/services. Living Situation: confirmed address, lives in an apartment alone with 3 flights of steps to enter    ADLs: independent     DME: has home O2 but doesn't know what company, [facility], life alert through [facility]    PT/OT Recs: Continue to assess pending progress      Active Services: [facility], has a home nurse but does not know what company, [employer]: uses [place] transportation, does not drive     Medications: confirmed [employer], no issues with prescriptions    PCP: confirmed TERESA Shannon      HD/PD: N/A    PLAN/COMMENTS: Plan is unknown at this time. Patient is concerned about going home alone as she is uncertain about her needs at D/C. Discussed the possible need for SNF or home care. Patient is active with home care, but does not know what company. Call to [facility] at [place] [address], she advised the patient is not active with [facility] services. Spoke with patient in the room regarding [facility] services. She advised she is active and also requested CM call her son to discuss her D/C options. Spoke with patient's daughter-in-law, Bhavesh Coon, she advised that the patient's son is not home and did not answer his cell number when she tried to reach him. She requested a SNF list be e-mailed to her at [email or web address]. Per previous visit notes, [phone], call to this number, he is out of the office. Call to [address], she confirmed MOW, low sodium cardiac - 7 meals per week and life alert button. She requested a call back when patient is D/C'd to reactive meals. Per chart review, patient was active with [facility]. Call to [facility] with [facility] to advise of patient's admission. She will follow for D/C needs. She is active for a skilled nurse.   She will look through documentation to see if they have the oxygen provider listed and will call CM back. CM provided contact information for patient or family to call with any questions. CM will follow and assist as needed. Walter Almonte RN, BSN, Case Management  [phone]  Electronically signed by Walter Almonte RN on 3/18/2021 at 3:29 PM     Received a call back from the patient's son, he advised that her brother is supposed to stay with her when she leaves the hospital.  He states if the patient requires drains or other needs at D/C, he would like her to go to a facility at D/C. He states that he would prefer [facility] if that is possible at D/C. Referral sent to [facility] via [address] [place] [address] Rd.   Walter Almonte RN, BSN, Case Management  Phone: [phone]  Electronically signed by Walter Almonte RN on 3/18/2021 at 4:17 PM

## 2021-03-18 NOTE — PROGRESS NOTES
complication ([facility] [address] has a past surgical history that includes  section; sinus surgery; Tonsillectomy; Adenoidectomy; Coronary angioplasty with stent; Mouth surgery (N/A, 2021); Nasal sinus surgery (N/A, 2021); laryngoscopy (N/A, 2021); eye surgery (Right); hernia repair; Hysterectomy; Finger trigger release (Bilateral); Mouth surgery (N/A, 3/17/2021); and Thyroidectomy (Left, 3/17/2021). Restrictions  Restrictions/Precautions  Restrictions/Precautions: Fall Risk  Position Activity Restriction  Other position/activity restrictions: NG Tube. L Neck : 2 MINO Drains. Vision/Hearing  Vision: Impaired  Hearing: Within functional limits     Subjective  General  Chart Reviewed: Yes  Additional Pertinent Hx: 73 y/o female admit 3/17/2021 with Oropharyngeal Ca. 3/17/2021 S/P Excision of Palate Lesion/L Neck Dissection. PMH as noted including CAD, A-Fib, Angio with Stent, Ischemic Cardiomyopathy, CHF, COPD, KATELYN. Family / Caregiver Present: No  Referring Practitioner: Dr. Dwayne Seo  Diagnosis: Oral Ca S/P Excision of Palate Lesion/L Neck Dissection  Follows Commands: Within Functional Limits  Subjective  Subjective: Pt agreeable to PT Eval/Rx (following alittle gentle encouragement). Pain Screening  Patient Currently in Pain: Denies          Orientation  Orientation  Overall Orientation Status: Within Functional Limits  Social/Functional History  Social/Functional History  Lives With: Alone  Type of Home: Apartment  Home Layout: One level, Laundry in basement  Home Access: Stairs to enter with rails  Entrance Stairs - Number of Steps: 3 flights (3rd-floor apartment)  Bathroom Shower/Tub: Tub/Shower unit  Bathroom Toilet: Standard  Bathroom Accessibility: Accessible  Home Equipment: Oxygen(2 L. O2 at night)  ADL Assistance: Independent  Homemaking Assistance: Independent(Pt receives MOW; cooks [employer]nie meals.  Pt is able to clean her apartment with some difficulty.)  Ambulation Assistance: Independent  Transfer Assistance: Independent  Active : No  Additional Comments: Pt denies any recent falls. Cognition   Cognition  Overall Cognitive Status: WFL    Objective     Observation/Palpation  Observation: NG Tube. L Neck MINO Drains (2). AROM RLE (degrees)  RLE AROM: WFL  AROM LLE (degrees)  LLE AROM : WFL  AROM RUE (degrees)  RUE AROM : WFL  AROM LUE (degrees)  LUE AROM : WFL  Strength RLE  Strength RLE: WFL  Strength LLE  Strength LLE: WFL        Bed mobility  Supine to Sit: Stand by assistance  Transfers  Sit to Stand: Stand by assistance  Stand to sit: Stand by assistance  Ambulation  Ambulation?: Yes  Ambulation 1  Surface: level tile  Device: No Device  Distance: 4-5 steps bed to chair only with Slight CGA. No LE buckling/giving way. Comments: Pt denies any further amb distances due to feeling \"miserable. \"              Plan   Plan  Times per week: 1-2 further PT Rxs. Current Treatment Recommendations: Strengthening, Functional Mobility Training, Transfer Training, Gait Training, Safety Education & Training, Patient/Caregiver Education & Training  Safety Devices  Type of devices: Call light within reach, Left in chair, Nurse notified      Goals  Short term goals  Time Frame for Short term goals: 1-2 further PT Rxs. Short term goal 1: Bed Mob Independent. Short term goal 2: Transfers Independent. Short term goal 3: Amb 100' Independent. Patient Goals   Patient goals : Return home.        Therapy Time   Individual Concurrent Group Co-treatment   Time In 1100         Time Out 1130         Minutes Humberto Call

## 2021-03-18 NOTE — PROGRESS NOTES
Hospitalist Progress Note    CC: <principal problem not specified>      Admit date: 3/17/2021  Days in hospital:  1    Subjective/interval history: Pt S/E. No new complaints. Pt sitting up in a chair, deneis sob, pain. O2 status: 3L O2, nc    ROS:   Pertinent items are noted in HPI. Objective:    /67   Pulse 75   Temp 98.3 °F (36.8 °C) (Axillary)   Resp 16   Ht 4' 11\" (1.499 m)   Wt 225 lb 15.5 oz (102.5 kg)   SpO2 99%   BMI 45.64 kg/m²     Gen: NAD, morbidly obese  HEENT: NC/AT, moist mucous membranes, no oropharyngeal erythema or exudate  Neck: supple, trachea midline, brennen drain in place with minimal serosanguinous fluid   Heart: Normal s1/s2, RRR, no murmurs, gallops, or rubs. Lungs: clear bilaterally, no wheezing, no rales, no rhonchi, no use of accessory muscles  Abd: bowel sounds present, soft, nontender, nondistended, no masses  Extrem: No clubbing, cyanosis, no edema  Skin: no rashes or lesions  Psych: A & O x3, affect appropriate  Neuro: grossly intact, moves all four extremities spontaneously.   Cap refill: +2 sec    Medications:  Scheduled Meds:   amiodarone  200 mg Oral Daily    budesonide-formoterol  2 puff Inhalation BID    carvedilol  12.5 mg Oral BID WC    famotidine  20 mg Oral BID    levothyroxine  50 mcg Oral Daily    metFORMIN  500 mg Oral BID WC    nicotine  1 patch Transdermal Daily    sodium chloride flush  10 mL Intravenous 2 times per day    enoxaparin  40 mg Subcutaneous Daily    bacitracin   Topical Q8H    ampicillin-sulbactam  3,000 mg Intravenous Q8H    atorvastatin  10 mg Oral Nightly    tiotropium  2 puff Inhalation Daily       PRN Meds:  albuterol, traZODone, sodium chloride flush, polyethylene glycol, ondansetron **OR** ondansetron, morphine, HYDROcodone 5 mg - acetaminophen    IV:   sodium chloride 75 mL/hr at 03/17/21 1831         Intake/Output Summary (Last 24 hours) at 3/18/2021 0807  Last data filed at 3/18/2021 1413  Gross per 24 hour   Intake 2965 ml   Output 980 ml   Net 1985 ml       Results:  CBC:   Recent Labs     03/18/21  0630   WBC 12.8*   HGB 12.0   HCT 36.6   MCV 86.5        BMP:   Recent Labs     03/18/21  0630      K 4.2      CO2 26   BUN 17   CREATININE 0.6     Mag: No results for input(s): MAG in the last 72 hours. Phos:   Lab Results   Component Value Date    PHOS 3.6 12/21/2017     No components found for: GLU    LIVER PROFILE: No results for input(s): AST, ALT, LIPASE, BILIDIR, BILITOT, ALKPHOS in the last 72 hours. Invalid input(s): AMYLASE,  ALB  PT/INR: No results for input(s): PROTIME, INR in the last 72 hours. APTT: No results for input(s): APTT in the last 72 hours. UA:No results for input(s): NITRITE, COLORU, PHUR, LABCAST, WBCUA, RBCUA, MUCUS, TRICHOMONAS, YEAST, BACTERIA, CLARITYU, SPECGRAV, LEUKOCYTESUR, UROBILINOGEN, BILIRUBINUR, BLOODU, GLUCOSEU, AMORPHOUS in the last 72 hours. Invalid input(s): Joyce Jain input(s): ABG  Lab Results   Component Value Date    CALCIUM 8.0 (L) 03/18/2021    PHOS 3.6 12/21/2017       Assessment:    Active Problems:    Cancer of palate ([facility] [address]    Status post dissection of neck  Resolved Problems:    * No resolved hospital problems. [facility] course: A 73 yo female, with Oropharyngeal squamous cell carcinoma, s/p excision of soft palate tumor by Dr Chandra Hernández. We were consulted for medical management of chd, cad, copd on 2L o2 at baseline, pvd. She recovered well in post op. Plan:    Leukocytosis  - likely reactive to surgery  - she is afebrile, respiratory status improving  - on unasyn   - repeat cbc tomorrow    Oropharyngeal cancer  - s/p excision of tumor  - management per primary     bl freeman   CAD  - s/p pci 2016  - cont BB  - not on a statin?  Will restart  - resume asa per ENT     PAF/aflutter    - currently in nsr  - continue bb, amio  - restart eliquis per ENT     Copd  - stable  - cont home inhalers     H/o Ischemic

## 2021-03-19 LAB
ANION GAP SERPL CALCULATED.3IONS-SCNC: 12 MMOL/L (ref 3–16)
BASOPHILS ABSOLUTE: 0 K/UL (ref 0–0.2)
BASOPHILS RELATIVE PERCENT: 0.3 %
BUN BLDV-MCNC: 17 MG/DL (ref 7–20)
CALCIUM SERPL-MCNC: 8.5 MG/DL (ref 8.3–10.6)
CHLORIDE BLD-SCNC: 103 MMOL/L (ref 99–110)
CO2: 25 MMOL/L (ref 21–32)
CREAT SERPL-MCNC: 0.7 MG/DL (ref 0.6–1.2)
EOSINOPHILS ABSOLUTE: 0 K/UL (ref 0–0.6)
EOSINOPHILS RELATIVE PERCENT: 0.1 %
GFR AFRICAN AMERICAN: >60
GFR NON-AFRICAN AMERICAN: >60
GLUCOSE BLD-MCNC: 154 MG/DL (ref 70–99)
HCT VFR BLD CALC: 35.8 % (ref 36–48)
HEMOGLOBIN: 11.8 G/DL (ref 12–16)
LYMPHOCYTES ABSOLUTE: 1.9 K/UL (ref 1–5.1)
LYMPHOCYTES RELATIVE PERCENT: 18.2 %
MAGNESIUM: 1.9 MG/DL (ref 1.8–2.4)
MCH RBC QN AUTO: 28.9 PG (ref 26–34)
MCHC RBC AUTO-ENTMCNC: 33.1 G/DL (ref 31–36)
MCV RBC AUTO: 87.3 FL (ref 80–100)
MONOCYTES ABSOLUTE: 0.8 K/UL (ref 0–1.3)
MONOCYTES RELATIVE PERCENT: 7.9 %
NEUTROPHILS ABSOLUTE: 7.8 K/UL (ref 1.7–7.7)
NEUTROPHILS RELATIVE PERCENT: 73.5 %
PDW BLD-RTO: 14.3 % (ref 12.4–15.4)
PLATELET # BLD: 272 K/UL (ref 135–450)
PMV BLD AUTO: 9.3 FL (ref 5–10.5)
POTASSIUM SERPL-SCNC: 3.7 MMOL/L (ref 3.5–5.1)
RBC # BLD: 4.1 M/UL (ref 4–5.2)
SODIUM BLD-SCNC: 140 MMOL/L (ref 136–145)
WBC # BLD: 10.6 K/UL (ref 4–11)

## 2021-03-19 PROCEDURE — 6370000000 HC RX 637 (ALT 250 FOR IP): Performed by: OTOLARYNGOLOGY

## 2021-03-19 PROCEDURE — 83735 ASSAY OF MAGNESIUM: CPT

## 2021-03-19 PROCEDURE — 6370000000 HC RX 637 (ALT 250 FOR IP): Performed by: FAMILY MEDICINE

## 2021-03-19 PROCEDURE — 36415 COLL VENOUS BLD VENIPUNCTURE: CPT

## 2021-03-19 PROCEDURE — 94640 AIRWAY INHALATION TREATMENT: CPT

## 2021-03-19 PROCEDURE — 2580000003 HC RX 258: Performed by: OTOLARYNGOLOGY

## 2021-03-19 PROCEDURE — 94761 N-INVAS EAR/PLS OXIMETRY MLT: CPT

## 2021-03-19 PROCEDURE — 2060000000 HC ICU INTERMEDIATE R&B

## 2021-03-19 PROCEDURE — 92526 ORAL FUNCTION THERAPY: CPT

## 2021-03-19 PROCEDURE — 80048 BASIC METABOLIC PNL TOTAL CA: CPT

## 2021-03-19 PROCEDURE — 6360000002 HC RX W HCPCS: Performed by: OTOLARYNGOLOGY

## 2021-03-19 PROCEDURE — 97530 THERAPEUTIC ACTIVITIES: CPT

## 2021-03-19 PROCEDURE — 2700000000 HC OXYGEN THERAPY PER DAY

## 2021-03-19 PROCEDURE — 85025 COMPLETE CBC W/AUTO DIFF WBC: CPT

## 2021-03-19 PROCEDURE — 94150 VITAL CAPACITY TEST: CPT

## 2021-03-19 RX ADMIN — Medication 10 ML: at 09:27

## 2021-03-19 RX ADMIN — LEVOTHYROXINE SODIUM 50 MCG: 0.05 TABLET ORAL at 05:42

## 2021-03-19 RX ADMIN — CARVEDILOL 12.5 MG: 12.5 TABLET, FILM COATED ORAL at 17:24

## 2021-03-19 RX ADMIN — FAMOTIDINE 20 MG: 20 TABLET, FILM COATED ORAL at 20:06

## 2021-03-19 RX ADMIN — Medication 10 ML: at 20:07

## 2021-03-19 RX ADMIN — METFORMIN HYDROCHLORIDE 500 MG: 500 TABLET ORAL at 09:26

## 2021-03-19 RX ADMIN — BUDESONIDE AND FORMOTEROL FUMARATE DIHYDRATE 2 PUFF: 80; 4.5 AEROSOL RESPIRATORY (INHALATION) at 08:24

## 2021-03-19 RX ADMIN — Medication 400 MG: at 09:26

## 2021-03-19 RX ADMIN — CARVEDILOL 12.5 MG: 12.5 TABLET, FILM COATED ORAL at 09:26

## 2021-03-19 RX ADMIN — TIOTROPIUM BROMIDE INHALATION SPRAY 2 PUFF: 3.12 SPRAY, METERED RESPIRATORY (INHALATION) at 08:24

## 2021-03-19 RX ADMIN — AMIODARONE HYDROCHLORIDE 200 MG: 200 TABLET ORAL at 09:26

## 2021-03-19 RX ADMIN — SODIUM CHLORIDE 3000 MG: 900 INJECTION INTRAVENOUS at 05:42

## 2021-03-19 RX ADMIN — BACITRACIN ZINC: 500 OINTMENT TOPICAL at 12:40

## 2021-03-19 RX ADMIN — SODIUM CHLORIDE 3000 MG: 900 INJECTION INTRAVENOUS at 14:18

## 2021-03-19 RX ADMIN — ENOXAPARIN SODIUM 40 MG: 40 INJECTION SUBCUTANEOUS at 09:26

## 2021-03-19 RX ADMIN — BUDESONIDE AND FORMOTEROL FUMARATE DIHYDRATE 2 PUFF: 80; 4.5 AEROSOL RESPIRATORY (INHALATION) at 20:06

## 2021-03-19 RX ADMIN — BACITRACIN ZINC: 500 OINTMENT TOPICAL at 05:43

## 2021-03-19 RX ADMIN — FUROSEMIDE 40 MG: 40 TABLET ORAL at 09:26

## 2021-03-19 RX ADMIN — METFORMIN HYDROCHLORIDE 500 MG: 500 TABLET ORAL at 17:24

## 2021-03-19 RX ADMIN — FAMOTIDINE 20 MG: 20 TABLET, FILM COATED ORAL at 09:26

## 2021-03-19 RX ADMIN — BACITRACIN ZINC: 500 OINTMENT TOPICAL at 21:45

## 2021-03-19 RX ADMIN — ATORVASTATIN CALCIUM 10 MG: 10 TABLET, FILM COATED ORAL at 20:06

## 2021-03-19 NOTE — PROGRESS NOTES
[facility]   Speech Therapy  Daily Dysphagia Treatment Note        Lopez Salomon  AGE: 72 y.o. GENDER: female  : 1955  [id number]  EPISODE DATE:  3/3/2021  Patient Active Problem List   Diagnosis    Atypical chest pain    COPD exacerbation ([facility]    Coronary artery disease involving native coronary artery of native heart without angina pectoris    Type 2 diabetes mellitus ([facility]    Essential hypertension    Pure hypercholesterolemia    Acquired hypothyroidism    Ischemic heart disease due to coronary artery obstruction (HCC)    Panlobular emphysema (HCC)    Moderate episode of recurrent major depressive disorder ([facility]    Obstructive sleep apnea syndrome    H/O total hysterectomy    Tobacco abuse    Morbid obesity with BMI of 40.0-44.9, adult (HCC)    Acute on chronic heart failure with normal ejection fraction (HCC)    Acute on chronic heart failure (HCC)    Suspected sleep apnea    Nicotine dependence    Mixed hyperlipidemia    Light headed    Atrial flutter (HCC)    Preop examination    Chronic obstructive pulmonary disease (HCC)    Tobacco use    Cancer of palate (HCC)    Status post dissection of neck     Allergies   Allergen Reactions    Codeine Itching, Nausea And Vomiting, Rash and Other (See Comments)    Oxycodone-Acetaminophen Nausea And Vomiting and Rash    Tramadol Nausea And Vomiting    Hydrocodone-Acetaminophen Nausea And Vomiting     Treatment Diagnosis: Dysphagia       Chart review:   HPI:65 y. o. 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 Surgery for excision of soft palate tumor and left selective neck dissection completed by Dr Karime Moreno on 3/17. NG tube in place; advanced to full liquid diet and SLP consulted for swallow evaluation on 3/18.  3/19 ENT note:I feel patient is overestimating how well she is doing. Per speech she was having difficulty on bedside swallow.   This is expected as she has a large defect of her palate. She really wants the NG tube out. Will plan on a calorie count today. Consider removal tomorrow. Diet to stay liquids for now. Estimate patient will be inpatient until Tuesday or Wednesday. Need to make sure diet is sufficient. Often pain will actually increase day 3-4 which could limit oral intake. Subjective:     Current diet  Full liquid    Comments regarding tolerating Current Diet:   Pt reports no issues with broth at lunch; improved intake  Nurse reports meds all given via ng tube    Objective:     Pain   Patient Currently in Pain: Denies    Cognitive/Behavior   Behavior/Cognition: Alert, Cooperative, Impulsive    Presentations   Consistencies Administered: Dysphagia Pureed (Dysphagia I), Thin - teaspoon, Ice Chips, Thin - cup    Positioning   Upright in bed    PO Trials:  · Thin Liquids cup x2; jello x2: no overt s/s, no gagging, no regurgitation, no pt complaints. Refused additional trials  · Nectar thick liquids  · Honey Thick liquids  · Puree  Refused by pt; stated she was full from lunch  · Soft food  · Regular food    Dysphagia Tx: Tolerated limited PO trials of thin via cup and spoon without overt s/s, gag, regurgitation, or pt complaint. Assessment limited 2/2 pt refusal of additional PO trials. Pt reports she consumed all of her broth at lunch and she is full. Despite education and encouragement for additional PO intake, pt declined. Insight re: deficits, extent of surgery and impact on swallow function appears diminished. Pt will benefit from continued reinforcement and ed re: swallow function post palatal resection. Goals: The patient will tolerate recommended diet without observed clinical signs of aspiration ongoing   The patient will tolerate repeat BSE when able. Ongoing; pt only accepting of thin water and jello this session  The patient/caregiver will demonstrate understanding of compensatory strategies for improved swallowing safety.  Ongoing; suspect reduced insight    Assessment:   Impressions:   Dysphagia Diagnosis: Suspected needs further assessment   Pt alert, cooperative but impulsive, with suspected decreased safety awareness and insight re: new deficits s/p soft palate and neck surgery. Pt without any pain complaints; verbalizes she wants NG tube removed. · Apparent oropharyngeal dysphagia, suspect oral > pharyngeal, with high likelihood for reduced  function r/t surgery. Pt without complaint or overt difficulty this session but was only agreeable to thin water x3 and jello x2. · Pt tolerated limited trials of thin liquid with timely swallow initiation and slightly reduced laryngeal elevation without overt s/s. · Recommend clear liquids; consider giving meds crushed in liquid to confirm tolerance prior to NG removal.    · ST will follow for ongoing assessment and additional recommendations. · Suspect potential eventual need for MBS in order to provide most accurate diet/liquid recommendations and swallow strategies.     Diet Recommendations:  Clear liquids   Recommended Form of Meds: Via alternative means of nutrition v crushed in liquid    Strategies:   Compensatory Swallowing Strategies: Upright as possible for all oral intake, Remain upright for 30-45 minutes after meals, Eat/Feed slowly, Small bites/sips    Education:  Consulted and agree with results and recommendations: Patient, RN  Patient Education: results, recommendations  Patient Education Response: Verbalizes understanding, Needs reinforcement    Prognosis:   good    Plan:     Continue Dysphagia Therapy: yes  Interventions: Therapeutic Interventions: Diet tolerance monitoring, Therapeutic PO trials with SLP, Patient/Family education  Duration/Frequency of therapy while on unit: Duration/Frequency of Treatment  Duration/Frequency of Treatment: 3-5x/wk while on acute unit  Discharge Instructions:   Anticipate Yes for further skilled Speech Therapy for Dysphagia at discharge    This note serves as a D/C Summary in the event that this patient is discharged prior to the next therapy session.     Coded treatment time:5  Total treatment time: 20    Electronically signed by  Evan Soto MS, CCC-SLP #[id number]  Speech Language Pathologist    on 3/19/2021 at 2:48 PM

## 2021-03-19 NOTE — PLAN OF CARE
Problem: Nutrition  Goal: Optimal nutrition therapy  Outcome: Ongoing   Nutrition Problem #1:  Biting/chewing (masticatory) difficulty  Intervention: Food and/or Nutrient Delivery: Continue Current Diet, Start Oral Nutrition Supplement  Nutritional Goals: consume >50% of meals and ONS during admission

## 2021-03-19 NOTE — PLAN OF CARE
Pain/discomfort being managed with PRN analgesics per MD orders. Pt able to express presence and absence of pain and rate pain appropriately using numerical scale. Skin assessment completed every shift. Pt assessed for incontinence, appropriate barrier cream applied prn. Pt encouraged to turn/rotate every 2 hours. Assistance provided if pt unable to do so themselves. Patient assessed for fall risk; fall precautions initiated. Patient and family instructed about safety devices. Environment kept free of clutter and adequate lighting provided. Bed locked and in lowest position. Call light within reach. Will continue to monitor. Fall risk assessment completed every shift. All precautions in place. Pt has call light within reach at all times. Room clear of clutter. Pt aware to call for assistance when getting up.      Intake/Output Summary (Last 24 hours) at 3/19/2021 1004  Last data filed at 3/19/2021 0815  Gross per 24 hour   Intake 1137.13 ml   Output 2152 ml   Net -1014.87 ml     Vitals:    03/19/21 0824   BP:    Pulse:    Resp: 20   Temp:    SpO2: 93%

## 2021-03-19 NOTE — PROGRESS NOTES
Physical Therapy  Facility/Department: [facility] 5W PROGRESSIVE CARE  Daily Treatment Note  NAME: Krissy Gonzalez  : 1955  MRN: [id number]    Date of Service: 3/19/2021    Discharge Recommendations:  Home with assist PRN, Patient would benefit from continued therapy after discharge   PT Equipment Recommendations  Equipment Needed: No    Assessment   Body structures, Functions, Activity limitations: Decreased functional mobility ; Decreased endurance  Assessment: Pt agreeable to activity, able to ambulate several trials without device, no LOB. She would benefit from continued therapy to improve her endurance, and maximize independence with mobility tasks. Anticipate safe return home with prn assist.  Do not anticipate need for home PT. Krissy Gonzalez scored a  on the AM-PAC short mobility form. If patient discharges prior to next session this note will serve as a discharge summary. Please see below for the latest assessment towards goals. Specific instructions for Next Treatment: Improve strength, balance, endurance  PT Education: Goals; General Safety;Gait Training;PT Role;Orientation;Plan of Care;Home Exercise Program;Energy Conservation; Functional Mobility Training;Precautions  Patient Education: Role of PT, POC, Need to call for assist.  REQUIRES PT FOLLOW UP: Yes  Activity Tolerance  Activity Tolerance: Patient Tolerated treatment well     Patient Diagnosis(es): The encounter diagnosis was Oropharyngeal cancer ([facility]      has a past medical history of Anxiety and depression, Asthma, Atrial fibrillation ([facility] CAD (coronary artery disease), Cancer ([facility] CHF (congestive heart failure) ([facility] COPD (chronic obstructive pulmonary disease) ([facility] Diabetes mellitus ([facility] GERD (gastroesophageal reflux disease), Hyperlipidemia, Hypertension, Hypothyroidism, Morbid obesity with BMI of 40.0-44.9, adult ([facility] On home O2, Sleep apnea, Thyroid disease, and Type 2 diabetes mellitus without complication ([facility] has a past surgical history that includes  section; sinus surgery; Tonsillectomy; Adenoidectomy; Coronary angioplasty with stent; Mouth surgery (N/A, 2021); Nasal sinus surgery (N/A, 2021); laryngoscopy (N/A, 2021); eye surgery (Right); hernia repair; Hysterectomy; Finger trigger release (Bilateral); Mouth surgery (N/A, 3/17/2021); and Thyroidectomy (Left, 3/17/2021). Restrictions  Restrictions/Precautions  Restrictions/Precautions: Fall Risk  Position Activity Restriction  Other position/activity restrictions: NG Tube. L Neck : 2 MINO Drains. Subjective   General  Chart Reviewed: Yes  Additional Pertinent Hx: 73 y/o female admit 3/17/2021 with Oropharyngeal Ca. 3/17/2021 S/P Excision of Palate Lesion/L Neck Dissection. PMH as noted including CAD, A-Fib, Angio with Stent, Ischemic Cardiomyopathy, CHF, COPD, KATELYN. Subjective  Subjective: Pt agreeable to session. Orientation  Orientation  Overall Orientation Status: Within Functional Limits    Objective      Bed mobility  Supine to Sit: Independent  Sit to Supine: Independent     Transfers  Sit to Stand: Supervision  Stand to sit: Supervision     Ambulation  Ambulation?: Yes  Ambulation 1  Surface: level tile  Device: No Device  Other Apparatus: O2  Assistance: Supervision  Quality of Gait: Pt able to ambulate increased distance without device, no assist, no LOB, no c/o pain or fatigue. Distance: 60', 15' x 2      Other Activities: Other (see comment)  Comment: Pt positioned for comfort in bedside chair at end of session. AM-PAC Score  AM-PAC Inpatient Mobility Raw Score : 21 (21 0854)  AM-PAC Inpatient T-Scale Score : 50.25 (21 0854)  Mobility Inpatient CMS 0-100% Score: 28.97 (21 0854)  Mobility Inpatient CMS G-Code Modifier : CJ (21 3944)    Goals  Short term goals  Time Frame for Short term goals: 1-2 further PT Rxs. Short term goal 1: Bed Mob Independent.   Short term goal 2: Transfers Independent. Short term goal 3: Amb 100' Independent. Long term goals  Time Frame for Long term goals : LTG = STG  Patient Goals   Patient goals : Return home. Plan    Plan  Times per week: 1-2 further PT Rxs. Specific instructions for Next Treatment: Improve strength, balance, endurance  Current Treatment Recommendations: Strengthening, Functional Mobility Training, Transfer Training, Gait Training, Safety Education & Training, Patient/Caregiver Education & Training  Safety Devices  Type of devices:  All fall risk precautions in place, Call light within reach, Gait belt, Left in chair  Restraints  Initially in place: No     Therapy Time   Individual Concurrent Group Co-treatment   Time In 0835         Time Out 0900         Minutes 25         Timed Code Treatment Minutes: 25 Minutes       Cherylene Angelucci, PT    Electronically signed by Cherylene Angelucci, KAYLEN [id number] on 3/19/2021 at 9:02 AM

## 2021-03-19 NOTE — PLAN OF CARE
Problem: Pain:  Goal: Pain level will decrease  Description: Pain level will decrease  Outcome: Met This Shift  Goal: Control of acute pain  Description: Control of acute pain  Outcome: Met This Shift  Goal: Control of chronic pain  Description: Control of chronic pain  Outcome: Met This Shift   Alert and oriented x4 Resp. Easy and even Sats. WNL on 3L O2 per n/c Lungs diminished. Cough and deep breathing exercises encouraged. No c/o pain Sutures intact to left neck cleansed and oint. Applied as directed MINO drains x2 in place with min. Amt redish drainage Cont.  Per BSC with SBA Frequently turns and repositions self NGT in place Free from fall/injury

## 2021-03-19 NOTE — CONSULTS
Comprehensive Nutrition Assessment    Type and Reason for Visit:  Consult(Poor intakes/ calorie count)    Nutrition Recommendations/Plan:   Continue Full liquid diet, advance as able per MD.  Start Ensure TID. Calorie count per MD, spoke with nrsg, tickets left in room. Nutrition Assessment:  Consult for poor PO intakes/calorie count. PMH of CAD, CHF, DM, GERD, and HLD. Pt with oropharyngeal cancer and s/p excision of palate lesion on 3/17. Pt with NG in place but wants out. MD wanting calorie count in order to possibly remove NG tomorrow. Pt only on full liquid diet, limited intakes. Will add ONS TID. Malnutrition Assessment:  Malnutrition Status: At risk for malnutrition       Estimated Daily Nutrient Needs:  Energy (kcal):  9339-7255 kcal (11-15 kcal/kg CBW)  Protein (g):  86 gm pro (2gm/kg IBW)      Fluid (ml/day):  1 mL/kcal    Nutrition Related Findings:  BM 3/18; +1 BLE edema; MINO drains in place      Wounds:  Surgical Incision(mouth and neck)       Current Nutrition Therapies:    DIET FULL LIQUID;     Anthropometric Measures:  · Height: 4' 11\" (149.9 cm)  · Current Body Weight: 222 lb (100.7 kg)   · Ideal Body Weight: 95 lbs; % Ideal Body Weight 233.7 %   · BMI: 44.8  · BMI Categories: Obese Class 3 (BMI 40.0 or greater)       Nutrition Diagnosis:   · Biting/chewing (masticatory) difficulty related to (s/p surgery) as evidenced by (only on full liquids)      Nutrition Interventions:   Food and/or Nutrient Delivery:  Continue Current Diet, Start Oral Nutrition Supplement  Nutrition Education/Counseling:  No recommendation at this time   Coordination of Nutrition Care:  Continue to monitor while inpatient    Goals:  consume >50% of meals and ONS during admission       Nutrition Monitoring and Evaluation:   Food/Nutrient Intake Outcomes:  Diet Advancement/Tolerance, Food and Nutrient Intake, Supplement Intake  Physical Signs/Symptoms Outcomes:  Biochemical Data, Weight, Skin, Chewing or Swallowing, Nutrition Focused Physical Findings, Meal Time Behavior     Discharge Planning:     Too soon to determine     Electronically signed by Jamilah Silva RD, LD on 3/19/21 at 2:43 PM EDT    Contact: [phone]

## 2021-03-19 NOTE — PROGRESS NOTES
Physician Progress Note      PATIENTKodi Zaragoza  CSN #:                  [id number]  :                       1955  ADMIT DATE:       3/17/2021 8:36 AM  DISCH DATE:  RESPONDING  PROVIDER #:        MAURILIO KEY DO          QUERY TEXT:    Dear Dr Rashel Shepherd,    Pt admitted with Oropharyngeal cancer. Noted post op increased O2 needs. Please document in progress notes and discharge summary the cause and   correlating clinical indicators to support such: The medical record reflects the following:  Risk Factors: HX- chronically on 2l, COPD, Morbid Obesity, Panlobular   emphysema, KATELYN, tobacco abuse  Clinical Indicators: Post op- 15- 10 L  vapotherm now, 100%. , O2 3L POD #1,   Sat 92% 3L POD # 2  Treatment: Oxygen, Consult hospitalist, wean oxygen, Cont Symbicort and   Spiriva    Thank You,  Jennifer Suarez RN BSN CDS CRCR  [email or web address]  Options provided:  -- Acute Postoperative Pulmonary Insufficiency  -- Other - I will add my own diagnosis  -- Disagree - Not applicable / Not valid  -- Disagree - Clinically unable to determine / Unknown  -- Refer to Clinical Documentation Reviewer    PROVIDER RESPONSE TEXT:    Had decreasing O2 needs post op    Query created by: Servando Edwards on 3/19/2021 10:51 AM      Electronically signed by:  MAURILIO KEY DO 3/19/2021 2:50 PM

## 2021-03-20 LAB
ANION GAP SERPL CALCULATED.3IONS-SCNC: 9 MMOL/L (ref 3–16)
BASOPHILS ABSOLUTE: 0 K/UL (ref 0–0.2)
BASOPHILS RELATIVE PERCENT: 0.4 %
BUN BLDV-MCNC: 16 MG/DL (ref 7–20)
CALCIUM SERPL-MCNC: 8.6 MG/DL (ref 8.3–10.6)
CHLORIDE BLD-SCNC: 105 MMOL/L (ref 99–110)
CO2: 28 MMOL/L (ref 21–32)
CREAT SERPL-MCNC: 0.6 MG/DL (ref 0.6–1.2)
EOSINOPHILS ABSOLUTE: 0 K/UL (ref 0–0.6)
EOSINOPHILS RELATIVE PERCENT: 0.3 %
GFR AFRICAN AMERICAN: >60
GFR NON-AFRICAN AMERICAN: >60
GLUCOSE BLD-MCNC: 99 MG/DL (ref 70–99)
HCT VFR BLD CALC: 35.3 % (ref 36–48)
HEMOGLOBIN: 11.9 G/DL (ref 12–16)
LYMPHOCYTES ABSOLUTE: 2.4 K/UL (ref 1–5.1)
LYMPHOCYTES RELATIVE PERCENT: 25.2 %
MCH RBC QN AUTO: 29.1 PG (ref 26–34)
MCHC RBC AUTO-ENTMCNC: 33.6 G/DL (ref 31–36)
MCV RBC AUTO: 86.6 FL (ref 80–100)
MONOCYTES ABSOLUTE: 1 K/UL (ref 0–1.3)
MONOCYTES RELATIVE PERCENT: 10.8 %
NEUTROPHILS ABSOLUTE: 6 K/UL (ref 1.7–7.7)
NEUTROPHILS RELATIVE PERCENT: 63.3 %
PDW BLD-RTO: 13.8 % (ref 12.4–15.4)
PLATELET # BLD: 226 K/UL (ref 135–450)
PMV BLD AUTO: 9 FL (ref 5–10.5)
POTASSIUM SERPL-SCNC: 3.7 MMOL/L (ref 3.5–5.1)
RBC # BLD: 4.08 M/UL (ref 4–5.2)
SODIUM BLD-SCNC: 142 MMOL/L (ref 136–145)
WBC # BLD: 9.4 K/UL (ref 4–11)

## 2021-03-20 PROCEDURE — 85025 COMPLETE CBC W/AUTO DIFF WBC: CPT

## 2021-03-20 PROCEDURE — 94761 N-INVAS EAR/PLS OXIMETRY MLT: CPT

## 2021-03-20 PROCEDURE — 6360000002 HC RX W HCPCS: Performed by: OTOLARYNGOLOGY

## 2021-03-20 PROCEDURE — 6370000000 HC RX 637 (ALT 250 FOR IP): Performed by: FAMILY MEDICINE

## 2021-03-20 PROCEDURE — 6370000000 HC RX 637 (ALT 250 FOR IP): Performed by: OTOLARYNGOLOGY

## 2021-03-20 PROCEDURE — 80048 BASIC METABOLIC PNL TOTAL CA: CPT

## 2021-03-20 PROCEDURE — 99024 POSTOP FOLLOW-UP VISIT: CPT | Performed by: OTOLARYNGOLOGY

## 2021-03-20 PROCEDURE — 92526 ORAL FUNCTION THERAPY: CPT

## 2021-03-20 PROCEDURE — 94640 AIRWAY INHALATION TREATMENT: CPT

## 2021-03-20 PROCEDURE — 2060000000 HC ICU INTERMEDIATE R&B

## 2021-03-20 PROCEDURE — 36415 COLL VENOUS BLD VENIPUNCTURE: CPT

## 2021-03-20 PROCEDURE — 94760 N-INVAS EAR/PLS OXIMETRY 1: CPT

## 2021-03-20 PROCEDURE — 2700000000 HC OXYGEN THERAPY PER DAY

## 2021-03-20 PROCEDURE — 2580000003 HC RX 258: Performed by: OTOLARYNGOLOGY

## 2021-03-20 PROCEDURE — 97129 THER IVNTJ 1ST 15 MIN: CPT

## 2021-03-20 RX ORDER — FUROSEMIDE 40 MG/1
40 TABLET ORAL 2 TIMES DAILY
Status: DISCONTINUED | OUTPATIENT
Start: 2021-03-20 | End: 2021-03-22 | Stop reason: HOSPADM

## 2021-03-20 RX ADMIN — Medication 10 ML: at 20:39

## 2021-03-20 RX ADMIN — ENOXAPARIN SODIUM 40 MG: 40 INJECTION SUBCUTANEOUS at 08:58

## 2021-03-20 RX ADMIN — AMIODARONE HYDROCHLORIDE 200 MG: 200 TABLET ORAL at 08:58

## 2021-03-20 RX ADMIN — BACITRACIN ZINC: 500 OINTMENT TOPICAL at 14:00

## 2021-03-20 RX ADMIN — FAMOTIDINE 20 MG: 20 TABLET, FILM COATED ORAL at 08:58

## 2021-03-20 RX ADMIN — FUROSEMIDE 40 MG: 40 TABLET ORAL at 10:59

## 2021-03-20 RX ADMIN — CARVEDILOL 12.5 MG: 12.5 TABLET, FILM COATED ORAL at 08:58

## 2021-03-20 RX ADMIN — BUDESONIDE AND FORMOTEROL FUMARATE DIHYDRATE 2 PUFF: 80; 4.5 AEROSOL RESPIRATORY (INHALATION) at 19:50

## 2021-03-20 RX ADMIN — METFORMIN HYDROCHLORIDE 500 MG: 500 TABLET ORAL at 17:35

## 2021-03-20 RX ADMIN — LEVOTHYROXINE SODIUM 50 MCG: 0.05 TABLET ORAL at 05:21

## 2021-03-20 RX ADMIN — BACITRACIN ZINC: 500 OINTMENT TOPICAL at 20:46

## 2021-03-20 RX ADMIN — FAMOTIDINE 20 MG: 20 TABLET, FILM COATED ORAL at 20:39

## 2021-03-20 RX ADMIN — Medication 400 MG: at 08:58

## 2021-03-20 RX ADMIN — BACITRACIN ZINC: 500 OINTMENT TOPICAL at 05:21

## 2021-03-20 RX ADMIN — CARVEDILOL 12.5 MG: 12.5 TABLET, FILM COATED ORAL at 17:35

## 2021-03-20 RX ADMIN — ATORVASTATIN CALCIUM 10 MG: 10 TABLET, FILM COATED ORAL at 20:39

## 2021-03-20 RX ADMIN — Medication 10 ML: at 08:59

## 2021-03-20 RX ADMIN — METFORMIN HYDROCHLORIDE 500 MG: 500 TABLET ORAL at 08:58

## 2021-03-20 RX ADMIN — BUDESONIDE AND FORMOTEROL FUMARATE DIHYDRATE 2 PUFF: 80; 4.5 AEROSOL RESPIRATORY (INHALATION) at 07:56

## 2021-03-20 RX ADMIN — TIOTROPIUM BROMIDE INHALATION SPRAY 2 PUFF: 3.12 SPRAY, METERED RESPIRATORY (INHALATION) at 07:56

## 2021-03-20 RX ADMIN — FUROSEMIDE 40 MG: 40 TABLET ORAL at 17:35

## 2021-03-20 RX ADMIN — TRAZODONE HYDROCHLORIDE 50 MG: 50 TABLET ORAL at 23:53

## 2021-03-20 ASSESSMENT — PAIN SCALES - GENERAL
PAINLEVEL_OUTOF10: 0

## 2021-03-20 NOTE — PROGRESS NOTES
Hospitalist Progress Note    CC: <principal problem not specified>      Admit date: 3/17/2021  Days in hospital:  3    Subjective/interval history: Pt S/E. No acute events. Pt deneis sob, pain. She is sitting with her O2 down below her nose, 97% so2. O2 status: room air    ROS:   Pertinent items are noted in HPI. Objective:    BP (!) 144/94   Pulse 72   Temp 97.7 °F (36.5 °C) (Oral)   Resp 17   Ht 4' 11\" (1.499 m)   Wt 224 lb 3.3 oz (101.7 kg)   SpO2 95%   BMI 45.28 kg/m²     Gen: NAD, morbidly obese  HEENT: NC/AT, moist mucous membranes  Neck: supple, trachea midline, brennen drains in place with minimal serosanguinous fluid   Heart: Normal s1/s2, RRR, no murmurs, gallops, or rubs. Lungs: clear bilaterally, no wheezing, no rales, no rhonchi, no use of accessory muscles  Abd: bowel sounds present, soft, nontender, nondistended  Extrem: No clubbing, cyanosis, no edema  Skin: no rashes or lesions  Psych: A & O x3, affect appropriate  Neuro: grossly intact, moves all four extremities spontaneously.  No focal deficits  Cap refill: +2 sec    Medications:  Scheduled Meds:   furosemide  40 mg Oral BID    magnesium oxide  400 mg Oral Daily    amiodarone  200 mg Oral Daily    budesonide-formoterol  2 puff Inhalation BID    carvedilol  12.5 mg Oral BID WC    famotidine  20 mg Oral BID    levothyroxine  50 mcg Oral Daily    metFORMIN  500 mg Oral BID     nicotine  1 patch Transdermal Daily    sodium chloride flush  10 mL Intravenous 2 times per day    enoxaparin  40 mg Subcutaneous Daily    bacitracin   Topical Q8H    atorvastatin  10 mg Oral Nightly    tiotropium  2 puff Inhalation Daily       PRN Meds:  albuterol, traZODone, sodium chloride flush, polyethylene glycol, ondansetron **OR** ondansetron, morphine, HYDROcodone 5 mg - acetaminophen    IV:        Intake/Output Summary (Last 24 hours) at 3/20/2021 1025  Last data filed at 3/20/2021 0943  Gross per 24 hour   Intake 763.33

## 2021-03-20 NOTE — PROGRESS NOTES
[facility]   Speech Therapy  Daily Dysphagia Treatment Note        Jaymie Shaver  AGE: 72 y.o. GENDER: female  : 1955  [id number]  EPISODE DATE:  3/3/2021  Patient Active Problem List   Diagnosis    Atypical chest pain    COPD exacerbation ([facility]    Coronary artery disease involving native coronary artery of native heart without angina pectoris    Type 2 diabetes mellitus ([facility]    Essential hypertension    Pure hypercholesterolemia    Acquired hypothyroidism    Ischemic heart disease due to coronary artery obstruction (HCC)    Panlobular emphysema ([facility])    Moderate episode of recurrent major depressive disorder ([facility]    Obstructive sleep apnea syndrome    H/O total hysterectomy    Tobacco abuse    Morbid obesity with BMI of 40.0-44.9, adult (HCC)    Acute on chronic heart failure with normal ejection fraction (HCC)    Acute on chronic heart failure (HCC)    Suspected sleep apnea    Nicotine dependence    Mixed hyperlipidemia    Light headed    Atrial flutter (HCC)    Preop examination    Chronic obstructive pulmonary disease (HCC)    Tobacco use    Cancer of palate (HCC)    Status post dissection of neck     Allergies   Allergen Reactions    Codeine Itching, Nausea And Vomiting, Rash and Other (See Comments)    Oxycodone-Acetaminophen Nausea And Vomiting and Rash    Tramadol Nausea And Vomiting    Hydrocodone-Acetaminophen Nausea And Vomiting     Treatment Diagnosis: Dysphagia       Chart review:   HPI:65 y. o. 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 Surgery for excision of soft palate tumor and left selective neck dissection completed by Dr Madi Odonnell on 3/17. NG tube in place; advanced to full liquid diet and SLP consulted for swallow evaluation on 3/18.  3/19 ENT note:I feel patient is overestimating how well she is doing. Per speech she was having difficulty on bedside swallow.   This is expected as she has a large defect of her palate. She really wants the NG tube out. Will plan on a calorie count today. Consider removal tomorrow. Diet to stay liquids for now. Estimate patient will be inpatient until Tuesday or Wednesday. Need to make sure diet is sufficient. Often pain will actually increase day 3-4 which could limit oral intake. Subjective:     Current diet  Full liquid    Comments regarding tolerating Current Diet:   Pt reports no issues   MD asking for recheck for possible upgrade    Objective:     Pain   Patient Currently in Pain: Denies    Cognitive/Behavior   Behavior/Cognition: Alert, Cooperative, Impulsive    Pt states she does not know what her surgery did when SLP comments on pt's soft palate. Presentations   Consistencies Administered: Dysphagia Pureed (Dysphagia I),  Thin - straw, thin - cup, thin-tsp. Minced and moist    Positioning   Upright in bed    PO Trials:  · Thin Liquids : vocal change when pt impulsively voices /ah/ after taking bolus. Clear vocal quality when pt swallows completely prior to voicing. · Nectar thick liquids:  NT  · Honey Thick liquids:  NT  · Puree  Puree x 7. Pt takes small bolus and says this is her habit. No cough, choke or vocal change noted. No oral residue  · Soft food/minced and moist:  Cracker in soup with cough and throat clear noted. · Regular food:  NT    Dysphagia Tx:   PO trials: as above. Pt refuses various items. Insight re: deficits, extent of surgery and impact on swallow function appears diminished. Pt will benefit from continued reinforcement and ed re: swallow function post palatal resection. Education on purpose of SLp and how this relates to her health and her surgery. Pt repeatedly states that she has help at home and anyone else can come in to help. Goals: The patient will tolerate recommended diet without observed clinical signs of aspiration ongoing   The patient will tolerate repeat BSE when able. Ongoing;   The patient/caregiver will demonstrate understanding of compensatory strategies for improved swallowing safety. Ongoing; suspect reduced insight    Assessment:   Impressions:   Dysphagia Diagnosis: Suspected needs further assessment   Pt alert, cooperative but impulsive, with suspected decreased safety awareness and insight re: new deficits s/p soft palate and neck surgery. Pt without any pain complaints; NG tube is no longer in place  · Apparent oropharyngeal dysphagia related to L neck dissection and removal of portion of soft palate. · Appears to tolerate purees and thin liquids this date  · Cough noted with soft solid items. · Upgrade to puree and continue thins appears appropriate  · Recommend Modified Barium Swallow on 3/22/2021 to clarify diet safety prior to suspected d/c home on 3/23 or 3/24 per ENT notes  · Please order diet upgrade and MBS if in agreement. Diet Recommendations:  Clear liquids   Recommended Form of Meds: Via alternative means of nutrition v crushed in liquid    Strategies:   Compensatory Swallowing Strategies: Upright as possible for all oral intake, Remain upright for 30-45 minutes after meals, Eat/Feed slowly, Small bites/sips    Education:  Consulted and agree with results and recommendations: Patient, RN  Patient Education: results, recommendations  Patient Education Response: Verbalizes understanding, Needs reinforcement    Prognosis:   good    Plan:     Continue Dysphagia Therapy: yes  Interventions: Therapeutic Interventions: Diet tolerance monitoring, Therapeutic PO trials with SLP, Patient/Family education  Duration/Frequency of therapy while on unit: Duration/Frequency of Treatment  Duration/Frequency of Treatment: 3-5x/wk while on acute unit  Discharge Instructions:   Anticipate Yes for further skilled Speech Therapy for Dysphagia at discharge    This note serves as a D/C Summary in the event that this patient is discharged prior to the next therapy session.     Coded treatment time: 15  Total treatment time: 28    Electronically signed by  Dorothy Cosme MS CCC/SLP [id number]  Speech Language Pathologist  03/20/21  1:46 PM

## 2021-03-20 NOTE — PROGRESS NOTES
Calorie Count Note    Type and Reason for Visit: Consult(Poor intakes/ calorie count)    Nutrition Assessment: Calorie count results below. Intake inadequate at this time, but did encourage Ensure intake. Will continue liberalized supplement as it does provide more calories and protein. No RD in house on Sunday, will have next update on Monday morning. Current diet and supplement order:    DIET FULL LIQUID;  Dietary Nutrition Supplements: Standard High Calorie Oral Supplement    Comparative Standards (Estimated Nutrition Needs):   · Estimated Daily Total Kcal: 4837-3927 kcal (11-15 kcal/kg CBW)  · Estimated Daily Protein (g): 86 gm pro (2gm/kg IBW)    Day 1 3/19  Meal Calories Protein Comments   Breakfast 330 0 No dinner recorded   Lunch 295 16    Dinner      Snacks/Supplements      Total 41% 19%     % Kcal needs met % Protein needs met      Day 2 3/20  Meal Calories Protein Comments   Breakfast 150 0    Lunch          Dinner      Snacks/Supplements      Total       % Kcal needs met % Protein needs met      Day 3   Meal Calories Protein Comments   Breakfast      Lunch      Dinner      Snacks/Supplements      Total       % Kcal needs met % Protein needs met      Intervention & Recommendations:   1. Continue Calorie Count  2.  Will continue current diet and supplements     Electronically signed by Arlin Seip, RD, LD on 3/20/21 at 9:28 AM EDT    Contact Number: [phone]

## 2021-03-20 NOTE — PLAN OF CARE
Problem: Pain:  Goal: Control of acute pain  Description: Control of acute pain  3/19/2021 2317 by Mendel Monte, RN  Outcome: Ongoing     Problem: OXYGENATION/RESPIRATORY FUNCTION  Goal: Patient will maintain patent airway  3/19/2021 2317 by Mendel Monte, RN  Outcome: Ongoing     Problem: ACTIVITY INTOLERANCE/IMPAIRED MOBILITY  Goal: Mobility/activity is maintained at optimum level for patient  3/19/2021 2317 by Mendel Monte, RN  Outcome: Ongoing     Problem: Nutrition  Goal: Optimal nutrition therapy  3/19/2021 2317 by Mendel Monte, RN  Outcome: Ongoing

## 2021-03-20 NOTE — PROGRESS NOTES
INT Inpatient Progress Note  Carl Lewis is an 72 y.o. female 2 from resection of palate SCCa and left SND levels IIA/B, III, IV. Significant discomfort overnight due to NG tube. Nutrition consulted - calorie counts recorded (next will be 3/22/2021). Tolerating PO without coughing/choking. OOB to chair. Exam:  No stridor, stertor; able to control secretions without issue  NG tube in place - removed following discussion with Dr. Bella Pinto  Neck incision C/D/I; JPs with serosanguinous drainage, neck soft. Medial MINO drain removed, steri-strip placed  Appropriate bilateral tonsillar eschars - no active hemorrhage or clots    MINO output:  65 lateral; 12.5 medial (medial removed)    Plan:  Diet: NG tube removed at this visit, patient currently on full liquid with Ensures. Appreciate SLP evaluation - will advance diet per their recommendations (currently on clears)  Neuro: Continue current pain control, patient states minimal pain  Pulm: Continue supplemental oxygen (2L/min, wears at home), inhalers   MSK: Continue to ambulate - OOB to chair as tolerated  -Continue wound care  Cardiovascular: Continuing statins, diuretics, blood pressure medications; rate controlled for atrial fibrillation (rate 64-89bpm)  -On apixaban/ASA 81mg outpatient   Endocrinology: Continue diabetes regimen, levothyroxine  GI: on famotidine, continue anti-nausea meds  DVT prophylaxis: will place SCDs, has been refusing lovenox shots, discussed with patient and she is amenable to subcutaneous lovenox;   Drains/Tubes: NG tube removed, medial MINO drain removed 3/220/2021; will remove lateral MINO drain when output <20cc/24h or <10cc/8h shift    Estimate patient will be inpatient until Tuesday or Wednesday. Need to make sure diet is sufficient. Often pain will actually increase day 3-4 which could limit oral intake. Also need to make sure she has someone who can bring her back to hospital urgently if she has a bleed (risk of bleeding up to 14 days). Appreciate medicine recommendations and assistance in her care.

## 2021-03-21 LAB
ANION GAP SERPL CALCULATED.3IONS-SCNC: 9 MMOL/L (ref 3–16)
BASOPHILS ABSOLUTE: 0 K/UL (ref 0–0.2)
BASOPHILS RELATIVE PERCENT: 0.6 %
BUN BLDV-MCNC: 15 MG/DL (ref 7–20)
CALCIUM SERPL-MCNC: 8.4 MG/DL (ref 8.3–10.6)
CHLORIDE BLD-SCNC: 105 MMOL/L (ref 99–110)
CO2: 28 MMOL/L (ref 21–32)
CREAT SERPL-MCNC: 0.7 MG/DL (ref 0.6–1.2)
EOSINOPHILS ABSOLUTE: 0.1 K/UL (ref 0–0.6)
EOSINOPHILS RELATIVE PERCENT: 1.7 %
GFR AFRICAN AMERICAN: >60
GFR NON-AFRICAN AMERICAN: >60
GLUCOSE BLD-MCNC: 120 MG/DL (ref 70–99)
HCT VFR BLD CALC: 34.8 % (ref 36–48)
HEMOGLOBIN: 11.6 G/DL (ref 12–16)
LYMPHOCYTES ABSOLUTE: 1.9 K/UL (ref 1–5.1)
LYMPHOCYTES RELATIVE PERCENT: 21.3 %
MCH RBC QN AUTO: 28.8 PG (ref 26–34)
MCHC RBC AUTO-ENTMCNC: 33.4 G/DL (ref 31–36)
MCV RBC AUTO: 86.3 FL (ref 80–100)
MONOCYTES ABSOLUTE: 0.9 K/UL (ref 0–1.3)
MONOCYTES RELATIVE PERCENT: 9.7 %
NEUTROPHILS ABSOLUTE: 5.8 K/UL (ref 1.7–7.7)
NEUTROPHILS RELATIVE PERCENT: 66.7 %
PDW BLD-RTO: 13.8 % (ref 12.4–15.4)
PLATELET # BLD: 223 K/UL (ref 135–450)
PMV BLD AUTO: 8.8 FL (ref 5–10.5)
POTASSIUM SERPL-SCNC: 3.6 MMOL/L (ref 3.5–5.1)
RBC # BLD: 4.03 M/UL (ref 4–5.2)
SODIUM BLD-SCNC: 142 MMOL/L (ref 136–145)
WBC # BLD: 8.7 K/UL (ref 4–11)

## 2021-03-21 PROCEDURE — 2580000003 HC RX 258: Performed by: OTOLARYNGOLOGY

## 2021-03-21 PROCEDURE — 94640 AIRWAY INHALATION TREATMENT: CPT

## 2021-03-21 PROCEDURE — 6360000002 HC RX W HCPCS: Performed by: OTOLARYNGOLOGY

## 2021-03-21 PROCEDURE — 85025 COMPLETE CBC W/AUTO DIFF WBC: CPT

## 2021-03-21 PROCEDURE — 6370000000 HC RX 637 (ALT 250 FOR IP): Performed by: FAMILY MEDICINE

## 2021-03-21 PROCEDURE — 94761 N-INVAS EAR/PLS OXIMETRY MLT: CPT

## 2021-03-21 PROCEDURE — 99024 POSTOP FOLLOW-UP VISIT: CPT | Performed by: OTOLARYNGOLOGY

## 2021-03-21 PROCEDURE — 80048 BASIC METABOLIC PNL TOTAL CA: CPT

## 2021-03-21 PROCEDURE — 2060000000 HC ICU INTERMEDIATE R&B

## 2021-03-21 PROCEDURE — 36415 COLL VENOUS BLD VENIPUNCTURE: CPT

## 2021-03-21 PROCEDURE — 2700000000 HC OXYGEN THERAPY PER DAY

## 2021-03-21 PROCEDURE — 6370000000 HC RX 637 (ALT 250 FOR IP): Performed by: OTOLARYNGOLOGY

## 2021-03-21 RX ADMIN — FUROSEMIDE 40 MG: 40 TABLET ORAL at 18:00

## 2021-03-21 RX ADMIN — METFORMIN HYDROCHLORIDE 500 MG: 500 TABLET ORAL at 09:00

## 2021-03-21 RX ADMIN — FAMOTIDINE 20 MG: 20 TABLET, FILM COATED ORAL at 09:00

## 2021-03-21 RX ADMIN — METFORMIN HYDROCHLORIDE 500 MG: 500 TABLET ORAL at 18:00

## 2021-03-21 RX ADMIN — ATORVASTATIN CALCIUM 10 MG: 10 TABLET, FILM COATED ORAL at 21:48

## 2021-03-21 RX ADMIN — BACITRACIN ZINC: 500 OINTMENT TOPICAL at 21:49

## 2021-03-21 RX ADMIN — BUDESONIDE AND FORMOTEROL FUMARATE DIHYDRATE 2 PUFF: 80; 4.5 AEROSOL RESPIRATORY (INHALATION) at 20:13

## 2021-03-21 RX ADMIN — ENOXAPARIN SODIUM 40 MG: 40 INJECTION SUBCUTANEOUS at 09:00

## 2021-03-21 RX ADMIN — FUROSEMIDE 40 MG: 40 TABLET ORAL at 09:00

## 2021-03-21 RX ADMIN — BACITRACIN ZINC: 500 OINTMENT TOPICAL at 14:10

## 2021-03-21 RX ADMIN — Medication 400 MG: at 09:00

## 2021-03-21 RX ADMIN — LEVOTHYROXINE SODIUM 50 MCG: 0.05 TABLET ORAL at 06:19

## 2021-03-21 RX ADMIN — BACITRACIN ZINC: 500 OINTMENT TOPICAL at 06:19

## 2021-03-21 RX ADMIN — FAMOTIDINE 20 MG: 20 TABLET, FILM COATED ORAL at 21:48

## 2021-03-21 RX ADMIN — BUDESONIDE AND FORMOTEROL FUMARATE DIHYDRATE 2 PUFF: 80; 4.5 AEROSOL RESPIRATORY (INHALATION) at 07:50

## 2021-03-21 RX ADMIN — Medication 10 ML: at 09:01

## 2021-03-21 RX ADMIN — TRAZODONE HYDROCHLORIDE 50 MG: 50 TABLET ORAL at 21:48

## 2021-03-21 RX ADMIN — TIOTROPIUM BROMIDE INHALATION SPRAY 2 PUFF: 3.12 SPRAY, METERED RESPIRATORY (INHALATION) at 07:50

## 2021-03-21 RX ADMIN — AMIODARONE HYDROCHLORIDE 200 MG: 200 TABLET ORAL at 09:00

## 2021-03-21 RX ADMIN — Medication 10 ML: at 21:48

## 2021-03-21 ASSESSMENT — PAIN SCALES - GENERAL
PAINLEVEL_OUTOF10: 0
PAINLEVEL_OUTOF10: 0

## 2021-03-21 NOTE — PROGRESS NOTES
Hospitalist Progress Note    CC: <principal problem not specified>      Admit date: 3/17/2021  Days in hospital:  4  Subjective/interval history: No acute events. O2 status: on her home O2 of 2L nc    ROS:   Pertinent items are noted in HPI. Objective:    /72   Pulse 71   Temp 98.1 °F (36.7 °C) (Oral)   Resp 16   Ht 4' 11\" (1.499 m)   Wt 218 lb 0.6 oz (98.9 kg)   SpO2 94%   BMI 44.04 kg/m²       Medications:  Scheduled Meds:   furosemide  40 mg Oral BID    magnesium oxide  400 mg Oral Daily    amiodarone  200 mg Oral Daily    budesonide-formoterol  2 puff Inhalation BID    carvedilol  12.5 mg Oral BID WC    famotidine  20 mg Oral BID    levothyroxine  50 mcg Oral Daily    metFORMIN  500 mg Oral BID WC    nicotine  1 patch Transdermal Daily    sodium chloride flush  10 mL Intravenous 2 times per day    enoxaparin  40 mg Subcutaneous Daily    bacitracin   Topical Q8H    atorvastatin  10 mg Oral Nightly    tiotropium  2 puff Inhalation Daily       PRN Meds:  albuterol, traZODone, sodium chloride flush, polyethylene glycol, ondansetron **OR** ondansetron, morphine, HYDROcodone 5 mg - acetaminophen    IV:        Intake/Output Summary (Last 24 hours) at 3/21/2021 0938  Last data filed at 3/21/2021 0430  Gross per 24 hour   Intake 440 ml   Output 1558.5 ml   Net -1118.5 ml       Results:  CBC:   Recent Labs     03/19/21  1046 03/20/21  0446 03/21/21  0541   WBC 10.6 9.4 8.7   HGB 11.8* 11.9* 11.6*   HCT 35.8* 35.3* 34.8*   MCV 87.3 86.6 86.3    226 223     BMP:   Recent Labs     03/19/21  1046 03/20/21  0446 03/21/21  0541    142 142   K 3.7 3.7 3.6    105 105   CO2 25 28 28   BUN 17 16 15   CREATININE 0.7 0.6 0.7     Mag: No results for input(s): MAG in the last 72 hours.   Phos:   Lab Results   Component Value Date    PHOS 2.6 03/18/2021     No components found for: GLU    LIVER PROFILE: No results for input(s): AST, ALT, LIPASE, BILIDIR, BILITOT, ALKPHOS in the last 72 hours. Invalid input(s): AMYLASE,  ALB  PT/INR: No results for input(s): PROTIME, INR in the last 72 hours. APTT: No results for input(s): APTT in the last 72 hours. UA:No results for input(s): NITRITE, COLORU, PHUR, LABCAST, WBCUA, RBCUA, MUCUS, TRICHOMONAS, YEAST, BACTERIA, CLARITYU, SPECGRAV, LEUKOCYTESUR, UROBILINOGEN, BILIRUBINUR, BLOODU, GLUCOSEU, AMORPHOUS in the last 72 hours. Invalid input(s): Monserrat Michael input(s): ABG  Lab Results   Component Value Date    CALCIUM 8.4 03/21/2021    PHOS 2.6 03/18/2021       Assessment:    Active Problems:    Cancer of palate ([facility] [facility]    Status post dissection of neck  Resolved Problems:    * No resolved hospital problems. [facility] course: A 73 yo female, with Oropharyngeal squamous cell carcinoma, s/p excision of soft palate tumor by Dr Rolando Morgan. We were consulted for medical management of chd, cad, copd on 2L o2 at baseline, pvd. She recovered well in post op. Plan:    Leukocytosis - resolved  - likely reactive to surgery  - she is afebrile, respiratory status improving  - on unasyn     Oropharyngeal cancer  - s/p excision of tumor  - management, diet per primary     bl freeman   CAD  - s/p pci 2016  - cont BB  - not on a statin?  restarted  - resume asa per ENT     PAF/aflutter    - currently in nsr  - continue bb, amio  - restart eliquis per ENT     Chronic hypoxic respiratory failure - stable   Copd  - stable  - cont home inhalers  - on her 2L O2 per her home dose     H/o Ischemic cardiomyopathy - euvolemic   - echo 2/2021, lvef 45%, normal diastolic function   - wt 686 standing scale today, c/w her dry wt  - cont coreg  - cont home lasix       htn  - bp stable  - cont coreg    Diabetes  - stable  - cont metformin   - SSI, accuchecks      Code status:  full  DVT prophylaxis: [x] Lovenox  [] SQ Heparin  [] SCDs because of  [] warfarin/oral direct thrombin inhibitor [] Encourage ambulation      Disposition:  [] Home [] Rehab [] Psych [] SNF  [] LTAC  [] Transfer to ICU  [] Transfer to PCU [] Other:   per primary; from a medical standpoint she is sable for discharge when ENT feels ready.  Will follow along her admission until she is discharged    Electronically signed by Vijay Corado DO on 3/21/2021 at 9:38 AM

## 2021-03-21 NOTE — PROGRESS NOTES
INT Inpatient Progress Note  Lexis Salinas is an 72 y.o. female s/p from resection of palate SCCa and left SND levels IIA/B, III, IV on 3/17/2021. Final pathology pending. Tolerating PO however coughing with clear liquids. OOB to chair. Exam:  No stridor, stertor; able to control secretions without issue  Neck incision C/D/I; MINO with serosanguinous drainage, neck soft. Lateral MINO drain removed, steri-strip placed  Good tongue mobility  No difficulty with shoulder range of motion/strength  Appropriate bilateral tonsillar eschars - no active hemorrhage or clots    MINO output:  13.5 lateral (lateral removed)    Plan:  Diet:  Appreciate SLP evaluation - continue pureed diet, thin liquids (cough noted per SLP)   -Plan for nodified barium swallow 3/22/2021  Neuro: Continue current pain control, patient states minimal pain  Pulm: Continue supplemental oxygen (2L/min, wears at home), inhalers   MSK: Continue to ambulate - OOB to chair as tolerated  -Continue wound care  Cardiovascular: Continuing statins, diuretics, blood pressure medications; rate controlled for atrial fibrillation (rate 64-89bpm)  -On apixaban/ASA 81mg outpatient   Endocrinology: Continue diabetes regimen, levothyroxine  GI: on famotidine, continue anti-nausea meds  DVT prophylaxis: subcutaneous lovenox  Drains/Tubes: none (lateral drain removed today)    Appreciate medicine recommendations and assistance in her care.

## 2021-03-22 ENCOUNTER — APPOINTMENT (OUTPATIENT)
Dept: GENERAL RADIOLOGY | Age: 66
DRG: 141 | End: 2021-03-22
Attending: OTOLARYNGOLOGY
Payer: COMMERCIAL

## 2021-03-22 VITALS
RESPIRATION RATE: 16 BRPM | OXYGEN SATURATION: 94 % | TEMPERATURE: 97.3 F | DIASTOLIC BLOOD PRESSURE: 66 MMHG | WEIGHT: 215.17 LBS | BODY MASS INDEX: 43.38 KG/M2 | SYSTOLIC BLOOD PRESSURE: 118 MMHG | HEART RATE: 73 BPM | HEIGHT: 59 IN

## 2021-03-22 LAB
ANION GAP SERPL CALCULATED.3IONS-SCNC: 11 MMOL/L (ref 3–16)
BASOPHILS ABSOLUTE: 0 K/UL (ref 0–0.2)
BASOPHILS RELATIVE PERCENT: 0.4 %
BUN BLDV-MCNC: 14 MG/DL (ref 7–20)
CALCIUM SERPL-MCNC: 8.6 MG/DL (ref 8.3–10.6)
CHLORIDE BLD-SCNC: 101 MMOL/L (ref 99–110)
CO2: 30 MMOL/L (ref 21–32)
CREAT SERPL-MCNC: 0.7 MG/DL (ref 0.6–1.2)
EOSINOPHILS ABSOLUTE: 0.3 K/UL (ref 0–0.6)
EOSINOPHILS RELATIVE PERCENT: 3.3 %
GFR AFRICAN AMERICAN: >60
GFR NON-AFRICAN AMERICAN: >60
GLUCOSE BLD-MCNC: 95 MG/DL (ref 70–99)
HCT VFR BLD CALC: 36.6 % (ref 36–48)
HEMOGLOBIN: 12.1 G/DL (ref 12–16)
LYMPHOCYTES ABSOLUTE: 2.5 K/UL (ref 1–5.1)
LYMPHOCYTES RELATIVE PERCENT: 27.8 %
MAGNESIUM: 1.7 MG/DL (ref 1.8–2.4)
MCH RBC QN AUTO: 28.6 PG (ref 26–34)
MCHC RBC AUTO-ENTMCNC: 33.2 G/DL (ref 31–36)
MCV RBC AUTO: 86 FL (ref 80–100)
MONOCYTES ABSOLUTE: 0.8 K/UL (ref 0–1.3)
MONOCYTES RELATIVE PERCENT: 9.2 %
NEUTROPHILS ABSOLUTE: 5.3 K/UL (ref 1.7–7.7)
NEUTROPHILS RELATIVE PERCENT: 59.3 %
PDW BLD-RTO: 13.7 % (ref 12.4–15.4)
PLATELET # BLD: 248 K/UL (ref 135–450)
PMV BLD AUTO: 9.2 FL (ref 5–10.5)
POTASSIUM SERPL-SCNC: 3.1 MMOL/L (ref 3.5–5.1)
RBC # BLD: 4.25 M/UL (ref 4–5.2)
SODIUM BLD-SCNC: 142 MMOL/L (ref 136–145)
WBC # BLD: 8.9 K/UL (ref 4–11)

## 2021-03-22 PROCEDURE — 94761 N-INVAS EAR/PLS OXIMETRY MLT: CPT

## 2021-03-22 PROCEDURE — BD1BYZZ FLUOROSCOPY OF MOUTH/OROPHARYNX USING OTHER CONTRAST: ICD-10-PCS | Performed by: RADIOLOGY

## 2021-03-22 PROCEDURE — 85025 COMPLETE CBC W/AUTO DIFF WBC: CPT

## 2021-03-22 PROCEDURE — 6370000000 HC RX 637 (ALT 250 FOR IP): Performed by: OTOLARYNGOLOGY

## 2021-03-22 PROCEDURE — 97116 GAIT TRAINING THERAPY: CPT | Performed by: PHYSICAL THERAPIST

## 2021-03-22 PROCEDURE — 83735 ASSAY OF MAGNESIUM: CPT

## 2021-03-22 PROCEDURE — 6370000000 HC RX 637 (ALT 250 FOR IP): Performed by: FAMILY MEDICINE

## 2021-03-22 PROCEDURE — 97530 THERAPEUTIC ACTIVITIES: CPT | Performed by: PHYSICAL THERAPIST

## 2021-03-22 PROCEDURE — 2700000000 HC OXYGEN THERAPY PER DAY

## 2021-03-22 PROCEDURE — 92526 ORAL FUNCTION THERAPY: CPT

## 2021-03-22 PROCEDURE — 36415 COLL VENOUS BLD VENIPUNCTURE: CPT

## 2021-03-22 PROCEDURE — 6360000002 HC RX W HCPCS: Performed by: OTOLARYNGOLOGY

## 2021-03-22 PROCEDURE — 74230 X-RAY XM SWLNG FUNCJ C+: CPT

## 2021-03-22 PROCEDURE — 2580000003 HC RX 258: Performed by: OTOLARYNGOLOGY

## 2021-03-22 PROCEDURE — 92611 MOTION FLUOROSCOPY/SWALLOW: CPT

## 2021-03-22 PROCEDURE — 94640 AIRWAY INHALATION TREATMENT: CPT

## 2021-03-22 PROCEDURE — 80048 BASIC METABOLIC PNL TOTAL CA: CPT

## 2021-03-22 RX ORDER — POTASSIUM CHLORIDE 20 MEQ/1
40 TABLET, EXTENDED RELEASE ORAL PRN
Status: DISCONTINUED | OUTPATIENT
Start: 2021-03-22 | End: 2021-03-22 | Stop reason: HOSPADM

## 2021-03-22 RX ORDER — POTASSIUM CHLORIDE 7.45 MG/ML
10 INJECTION INTRAVENOUS PRN
Status: DISCONTINUED | OUTPATIENT
Start: 2021-03-22 | End: 2021-03-22 | Stop reason: HOSPADM

## 2021-03-22 RX ORDER — BACITRACIN ZINC AND POLYMYXIN B SULFATE 500; 1000 [USP'U]/G; [USP'U]/G
OINTMENT TOPICAL
Qty: 1 TUBE | Refills: 0 | Status: SHIPPED | OUTPATIENT
Start: 2021-03-22 | End: 2021-03-29

## 2021-03-22 RX ORDER — LANOLIN ALCOHOL/MO/W.PET/CERES
400 CREAM (GRAM) TOPICAL DAILY
Qty: 30 TABLET | Refills: 0 | OUTPATIENT
Start: 2021-03-23

## 2021-03-22 RX ADMIN — Medication 10 ML: at 08:47

## 2021-03-22 RX ADMIN — FUROSEMIDE 40 MG: 40 TABLET ORAL at 08:46

## 2021-03-22 RX ADMIN — TIOTROPIUM BROMIDE INHALATION SPRAY 2 PUFF: 3.12 SPRAY, METERED RESPIRATORY (INHALATION) at 08:07

## 2021-03-22 RX ADMIN — LEVOTHYROXINE SODIUM 50 MCG: 0.05 TABLET ORAL at 06:31

## 2021-03-22 RX ADMIN — BUDESONIDE AND FORMOTEROL FUMARATE DIHYDRATE 2 PUFF: 80; 4.5 AEROSOL RESPIRATORY (INHALATION) at 08:07

## 2021-03-22 RX ADMIN — BACITRACIN ZINC: 500 OINTMENT TOPICAL at 13:55

## 2021-03-22 RX ADMIN — FAMOTIDINE 20 MG: 20 TABLET, FILM COATED ORAL at 08:46

## 2021-03-22 RX ADMIN — Medication 400 MG: at 08:46

## 2021-03-22 RX ADMIN — METFORMIN HYDROCHLORIDE 500 MG: 500 TABLET ORAL at 08:46

## 2021-03-22 RX ADMIN — POTASSIUM CHLORIDE 40 MEQ: 1500 TABLET, EXTENDED RELEASE ORAL at 11:17

## 2021-03-22 RX ADMIN — BACITRACIN ZINC: 500 OINTMENT TOPICAL at 06:31

## 2021-03-22 RX ADMIN — AMIODARONE HYDROCHLORIDE 200 MG: 200 TABLET ORAL at 08:48

## 2021-03-22 RX ADMIN — ENOXAPARIN SODIUM 40 MG: 40 INJECTION SUBCUTANEOUS at 08:47

## 2021-03-22 RX ADMIN — CARVEDILOL 12.5 MG: 12.5 TABLET, FILM COATED ORAL at 08:46

## 2021-03-22 ASSESSMENT — PAIN SCALES - GENERAL
PAINLEVEL_OUTOF10: 0

## 2021-03-22 NOTE — CARE COORDINATION
[facility]  Received referral from case management   Faxed orders to [facility] to resume care  Home care to see patient by   3/24/2021  Danie Garvin LPN    [address]. Cell [phone], Fax [phone]

## 2021-03-22 NOTE — PROGRESS NOTES
Patient had no acute events overnight. She says that she is eating very well. She has no pain. She does not even want pain medications at this point. Exam  Neck incision is clean dry and intact without any evidence of hematoma. The left-sided oropharynx has had appropriate eschar. No evidence of bleeding    Pathology came back which showed negative margins, no evidence of perivascular lymphovascular invasion and all lymph nodes removed and left neck were negative for cancer    Plan  I gave the patient the good news that we had negative margins on her final pathology. The closest is about a millimeter deep. There is also not any poor prognostic indicators such as perineural or lymphovascular invasion. In addition she had 54 lymph nodes removed from her left neck all of which were negative for cancer. I think that it is unlikely that we would need to consider radiation therapy at this time. Patient is adamant about getting out of the hospital.  I think she is tolerating at least enough by mouth that it is okay to discharge. I would like to see her in 1 week to make sure she is doing okay. I think it will be okay for her to start her blood thinners on postoperative day 10. I had a long discussion with her that I want somebody at her house with her 24 hours a day for the next 10 days. There is a chance of postsurgical bleeding of the oropharynx and she would need a way to get to the hospital emergently. She says that somebody will be with her all the time.

## 2021-03-22 NOTE — PROGRESS NOTES
3/22/2021    To whom it may concern:    Marty Bosch  was hospitalized from 3/17/2021 to 3/22/2021. She had an extensive medical procedure completed and is now very weak and unable to ambulate far distances and trouble with stairs. She plans on moving close to her family for help with daily care, whch I feel would be beneficial for her. If you have any questions or concerns, please do not hesitate to contact me at [phone].     Sincerely,          Electronically signed by Analia Hicks DO on 3/22/2021 at 2:42 PM

## 2021-03-22 NOTE — PLAN OF CARE
Problem: OXYGENATION/RESPIRATORY FUNCTION  Goal: Patient will maintain patent airway  Outcome: Ongoing  Goal: Patient will achieve/maintain normal respiratory rate/effort  Description: Respiratory rate and effort will be within normal limits for the patient  Outcome: Ongoing     Problem: HEMODYNAMIC STATUS  Goal: Patient has stable vital signs and fluid balance  Outcome: Ongoing     Problem: FLUID AND ELECTROLYTE IMBALANCE  Goal: Fluid and electrolyte balance are achieved/maintained  Outcome: Ongoing     Problem: ACTIVITY INTOLERANCE/IMPAIRED MOBILITY  Goal: Mobility/activity is maintained at optimum level for patient  Outcome: Ongoing     Problem: Falls - Risk of:  Goal: Will remain free from falls  Description: Will remain free from falls  Outcome: Ongoing  Goal: Absence of physical injury  Description: Absence of physical injury  Outcome: Ongoing     Problem: Nutrition  Goal: Optimal nutrition therapy  Outcome: Ongoing

## 2021-03-22 NOTE — DISCHARGE INSTR - COC
Continuity of Care Form    Patient Name: Vijay Arias   :  1955  MRN:  [id number]    Admit date:  3/17/2021  Discharge date:  21    Code Status Order: Full Code   Advance Directives:   Advance Care Flowsheet Documentation     Date/Time Healthcare Directive Type of Healthcare Directive Copy in [address] Agent's Name Healthcare Agent's Phone Number    03/15/21 1513  No, patient does not have an advance directive for healthcare treatment -- -- -- -- --          Admitting Physician:  Carl Ricketts MD  PCP: TERESA Roger - NP    Discharging Nurse: Adry Damico Unit/Room#: H8M-5778/5122-01  Discharging Unit Phone Number: [phone]    Emergency Contact:   Extended Emergency Contact Information  Primary Emergency Contact: Eulalia Soto   [address] Phone: [phone]  Relation: Child  Secondary Emergency Contact: Erin Horowitz, [address] [place] [address] [address] Phone: [phone]  Mobile Phone: [phone]  Relation: Brother/Sister    Past Surgical History:  Past Surgical History:   Procedure Laterality Date    ADENOIDECTOMY       SECTION      x3    CORONARY ANGIOPLASTY WITH STENT PLACEMENT      EYE SURGERY Right     laser    FINGER TRIGGER RELEASE Bilateral     HERNIA REPAIR      umbilical--X 2    HYSTERECTOMY      complete--d/t fibroid tumors    LARYNGOSCOPY N/A 2021    DIRECT LARYNGOSCOPY performed by Carl Ricketts MD at [place] [address] N/A 2021    ORAL PHARYNGEAL BIOPSY performed by Carl Ricketts MD at [address] [place] [address] 3/17/2021    EXCISION OF PALATE LESION performed by Carl Ricketts MD at [facility] N/A 2021    NASAL ENDOSCOPY performed by Carl Ricketts MD at [facility] 13 Left 3/17/2021    LEFT NECK DISSECTION performed by Carl Ricketts MD at [address] [place] [address] History:   Immunization History   Administered Date(s) Administered    Influenza Vaccine, unspecified formulation 11/14/2012, 11/01/2016    Influenza Virus Vaccine 10/28/2011    Influenza, Courtney Tiarra, IM, PF (6 mo and older Fluzone, Flulaval, Fluarix, and 3 yrs and older Afluria) 10/29/2019    Influenza, Courtney Tiarra, Recombinant, IM PF (Flublok 18 yrs and older) 01/29/2019    Pneumococcal Polysaccharide ([id number]) 04/16/2007, 11/14/2008, 10/28/2011, 11/01/2016       Active Problems:  Patient Active Problem List   Diagnosis Code    Atypical chest pain R07.89    COPD exacerbation ([facility] J44.1    Coronary artery disease involving native coronary artery of native heart without angina pectoris I25.10    Type 2 diabetes mellitus ([facility] E11.9    Essential hypertension I10    Pure hypercholesterolemia E78.00    Acquired hypothyroidism E03.9    Ischemic heart disease due to coronary artery obstruction (HCC) I24.0, I25.9    Panlobular emphysema ([facility]) J43.1    Moderate episode of recurrent major depressive disorder ([facility] F33.1    Obstructive sleep apnea syndrome G47.33    H/O total hysterectomy Z90.710    Tobacco abuse Z72.0    Morbid obesity with BMI of 40.0-44.9, adult (HCC) E66.01, Z68.41    Acute on chronic heart failure with normal ejection fraction (HCC) I50.33    Acute on chronic heart failure (HCC) I50.9    Suspected sleep apnea R29.818    Nicotine dependence F17.200    Mixed hyperlipidemia E78.2    Light headed R42    Atrial flutter (HCC) I48.92    Preop examination Z01.818    Chronic obstructive pulmonary disease (HCC) J44.9    Tobacco use Z72.0    Cancer of palate ([facility]) C05.9    Status post dissection of neck Z98.890       Isolation/Infection:   Isolation          No Isolation        Patient Infection Status     Infection Onset Added Last Indicated Last Indicated By Review Planned Expiration Resolved Resolved By    None active    Resolved    COVID-19 Rule Out 02/22/21 02/22/21 02/22/21 COVID-19, Rapid (Ordered)   02/22/21 Rule-Out Test Resulted          Nurse Assessment:  Last Vital Signs: /67   Pulse 66   Temp 98.6 °F (37 °C) (Oral)   Resp 18   Ht 4' 11\" (1.499 m)   Wt 215 lb 2.7 oz (97.6 kg)   SpO2 92%   BMI 43.46 kg/m²     Last documented pain score (0-10 scale): Pain Level: 0  Last Weight:   Wt Readings from Last 1 Encounters:   03/22/21 215 lb 2.7 oz (97.6 kg)     Mental Status:  oriented and alert    IV Access:  - None    Nursing Mobility/ADLs:  Walking   Independent  Transfer  Independent  Bathing  Independent  Dressing  Independent  Toileting  Independent  Feeding  Independent  Med Admin  Independent  Med Delivery   none    Wound Care Documentation and Therapy:        Elimination:  Continence:   · Bowel: Yes  · Bladder: Yes  Urinary Catheter: None   Colostomy/Ileostomy/Ileal Conduit: No       Date of Last BM: 03/22/21    Intake/Output Summary (Last 24 hours) at 3/22/2021 1300  Last data filed at 3/22/2021 1200  Gross per 24 hour   Intake 1000 ml   Output 1660 ml   Net -660 ml     I/O last 3 completed shifts: In: 36 [P.O.:760]  Out: 1910 [Urine:1910]    Safety Concerns:     None    Impairments/Disabilities:      None    Nutrition Therapy:  Current Nutrition Therapy:   - Oral Diet:  Dysphagia 1 pureed    Routes of Feeding: Oral  Liquids: No Restrictions  Daily Fluid Restriction: no  Last Modified Barium Swallow with Video (Video Swallowing Test): done on 03/22/2021    Treatments at the Time of Hospital Discharge:   Respiratory Treatments:   Oxygen Therapy:  is on oxygen at 2 L/min per nasal cannula. Ventilator:    - No ventilator support    Rehab Therapies: Physical Therapy, Occupational Therapy and Nurse  Weight Bearing Status/Restrictions: No weight bearing restirctions  Other Medical Equipment (for information only, NOT a DME order):     Other Treatments: HOME HEALTH CARE: [facility] [address] agency to establish plan of care for patient over 60 day period [id number]}    PHYSICIAN SIGNATURE:  {Esignature:[id number]}

## 2021-03-22 NOTE — PROGRESS NOTES
Physical Therapy  Facility/Department: [facility] 5 PROGRESSIVE CARE  Daily Treatment Note/Discharge Note  NAME: Ronna Nguyen  : 1955  MRN: [id number]    Date of Service: 3/22/2021    Discharge Recommendations:  Home with assist PRN   PT Equipment Recommendations  Equipment Needed: No  Ronna Nguyen scored a 23/24 on the AM-PAC short mobility form. At this time, no further PT is recommended upon discharge. Recommend patient returns to prior setting with prior services. Assessment   Assessment: pt appears close to her baseline; no further therapy indicated  PT Education: General Safety  REQUIRES PT FOLLOW UP: No  Activity Tolerance  Activity Tolerance: Patient Tolerated treatment well     Patient Diagnosis(es): The encounter diagnosis was Oropharyngeal cancer ([facility] has a past medical history of Anxiety and depression, Asthma, Atrial fibrillation ([facility] CAD (coronary artery disease), Cancer ([facility] CHF (congestive heart failure) ([facility] COPD (chronic obstructive pulmonary disease) ([facility] Diabetes mellitus ([facility] GERD (gastroesophageal reflux disease), Hyperlipidemia, Hypertension, Hypothyroidism, Morbid obesity with BMI of 40.0-44.9, adult ([facility] On home O2, Sleep apnea, Thyroid disease, and Type 2 diabetes mellitus without complication ([facility] has a past surgical history that includes  section; sinus surgery; Tonsillectomy; Adenoidectomy; Coronary angioplasty with stent; Mouth surgery (N/A, 2021); Nasal sinus surgery (N/A, 2021); laryngoscopy (N/A, 2021); eye surgery (Right); hernia repair; Hysterectomy; Finger trigger release (Bilateral); Mouth surgery (N/A, 3/17/2021); and Thyroidectomy (Left, 3/17/2021). Restrictions  Subjective   General  Chart Reviewed: Yes  Additional Pertinent Hx: 73 y/o female admit 3/17/2021 with Oropharyngeal Ca. 3/17/2021 S/P Excision of Palate Lesion/L Neck Dissection.    PMH as noted including CAD, A-Fib, Angio with Stent, Ischemic Transfer Training, Gait Training, Safety Education & Training, Patient/Caregiver Education & Training  Safety Devices  Type of devices: Call light within reach, Nurse notified(pt left sitting on EOB finishing her coffee)  Restraints  Initially in place: No     Therapy Time   Individual Concurrent Group Co-treatment   Time In 1316         Time Out 3436         Minutes 33                 MAURILIO HOLLAND, [address], [place], [address]

## 2021-03-22 NOTE — DISCHARGE SUMMARY
Discharge Summary    Carl Lewis  :  1955  MRN:  [id number]    ADMIT DATE:  3/17/2021  DISCHARGE DATE:  3/22/2021    PRIMARY CARE PHYSICIAN:  TERESA Awad NP    VISIT STATUS: inpatietn    CODE STATUS:  Full Code    DISCHARGE DIAGNOSES:  Active Problems:    Cancer of palate ([address]    Status post dissection of neck  Resolved Problems:    * No resolved hospital problems. 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 The patient actually did remarkably well considering the nature of the surgery. She was tolerating a soft puréed diet. Pain was completely under control. MINO drains were removed over the weekend. Decision was made to discharge her on . SIGNIFICANT DIAGNOSTIC STUDIES:  Pathology showed negative margins and no evidence of metastatic lymphadenopathy in the 54 lymph nodes removed from the left neck  CONSULTANTS:  Internal medicine was consulted to assist with her medical comorbidities.   RECOMMENDED NEXT STEPS:   She will follow up with me next Monday     DISCHARGE MEDICATIONS:       Thu Fail   Home Medication Instructions [id number]    Printed on:21 1326   Medication Information                      albuterol sulfate  (90 Base) MCG/ACT inhaler  INHALE 2 PUFFS EVERY 6 HOURS AS NEEDED FOR WHEEZING             amiodarone (CORDARONE) 200 MG tablet  Take 1 tablet by mouth daily             atorvastatin (LIPITOR) 20 MG tablet  Take 20 mg by mouth daily             bacitracin-polymyxin b (POLYSPORIN) [id number] UNIT/GM ointment  Apply topically twice a day to neck incision             budesonide-formoterol (SYMBICORT) 80-4.5 MCG/ACT AERO  Inhale 2 puffs into the lungs 2 times daily             carvedilol (COREG) 12.5 MG tablet  Take 1 tablet by mouth 2 times daily (with meals)             famotidine (PEPCID) 20 MG tablet  Take 1 tablet by mouth 2 times daily             furosemide (LASIX) 40 MG tablet  TAKE ONE (1) TABLET BY MOUTH TWICE A DAY              hydrOXYzine (ATARAX) 10 MG tablet  Take 10 mg by mouth 3 times daily as needed for Itching             levothyroxine (SYNTHROID) 50 MCG tablet  Take 1 tablet by mouth Daily             metFORMIN (GLUCOPHAGE) 1000 MG tablet  Take 1 tablet by mouth 2 times daily (with meals)             nicotine (NICODERM CQ) 14 MG/24HR  Place 1 patch onto the skin daily             Spacer/Aero-Holding Chambers (E-Z SPACER) ALBA  1 Device by Does not apply route daily             tiotropium (SPIRIVA RESPIMAT) 2.5 MCG/ACT AERS inhaler  Inhale 2 puffs into the lungs daily             traZODone (DESYREL) 50 MG tablet  Take 1 tablet by mouth as needed for Sleep                 DIET: Dietary Nutrition Supplements: Standard High Calorie Oral Supplement  DIET DYSPHAGIA PUREED;    ACTIVITY: Light activity, no lifting more than 15 pounds for the next 10 days      DISPOSITION: Home   Long discussion with the patient on having someone available at home. She said that her brother lives with her and will be there all the time. She understands that the biggest risk of being discharged is a postoperative bleed which tends to occur somewhere around postoperative day 5-12. She needs to be able to get back to the emergency room immediately if this happens. She said that somebody will be with her all the time        Follow up with Dr. Madi Odonnell on Monday    Discharged condition: Good    INSTRUCTIONS TO MA/SW: Please call patient on day after discharge (must document patient  contacted within 2 business days of discharge). FOLLOW UP QUESTIONS FOR MA/SW:  1. Did you get medications filled and taking them as instructed from discharge? 2. Are you following your discharge instructions from your hospital stay? 3. Please confirm patient is scheduled for a follow up appointment within the above time frame.       SIGNED:  Alex Wesley MD   3/22/2021, 1:26 PM

## 2021-03-22 NOTE — PROGRESS NOTES
CLINICAL PHARMACY NOTE: MEDS TO [facility] Select Patient?: No  Total # of Prescriptions Filled: 1   The following medications were delivered to the patient:  Current Discharge Medication List      START taking these medications    Details   bacitracin-polymyxin b (POLYSPORIN) [id number] UNIT/GM ointment Apply topically twice a day to neck incision  Qty: 1 Tube, Refills: 0         ·   Total # of Interventions Completed: 0  Time Spent (min): 30    Additional Documentation:

## 2021-03-22 NOTE — PROGRESS NOTES
Hospitalist Progress Note    CC: <principal problem not specified>      Admit date: 3/17/2021  Days in hospital:  5  Subjective/interval history: No acute events. She wants to go home. States shes eating well. O2 status: on her home O2 of 2L nc    ROS:   Pertinent items are noted in HPI.      Objective:    /74   Pulse 65   Temp 98.3 °F (36.8 °C) (Oral)   Resp 18   Ht 4' 11\" (1.499 m)   Wt 215 lb 2.7 oz (97.6 kg)   SpO2 94%   BMI 43.46 kg/m²      Gen: alert, NAD, morbidly obese  HEENT: NC/AT, moist mucous membranes, no oropharyngeal erythema or exudate  Neck: supple, trachea midline, no anterior cervical or SC LAD  Heart:  Normal s1/s2, RRR, no murmurs, gallops, or rubs  Lungs:  clear bilaterally, no wheezing, no rales, no rhonchi, no use of accessory muscles  Abd: bowel sounds present, soft, nontender, nondistended, no masses  Extrem:  No clubbing, cyanosis, no edema  Skin: no rashes or lesions  Psych: A & O x3, affect appropriate  Neuro: grossly intact, moves all four extremities spontaneously      Medications:  Scheduled Meds:   furosemide  40 mg Oral BID    magnesium oxide  400 mg Oral Daily    amiodarone  200 mg Oral Daily    budesonide-formoterol  2 puff Inhalation BID    carvedilol  12.5 mg Oral BID WC    famotidine  20 mg Oral BID    levothyroxine  50 mcg Oral Daily    metFORMIN  500 mg Oral BID WC    nicotine  1 patch Transdermal Daily    sodium chloride flush  10 mL Intravenous 2 times per day    enoxaparin  40 mg Subcutaneous Daily    bacitracin   Topical Q8H    atorvastatin  10 mg Oral Nightly    tiotropium  2 puff Inhalation Daily       PRN Meds:  potassium chloride **OR** potassium alternative oral replacement **OR** potassium chloride, albuterol, traZODone, sodium chloride flush, polyethylene glycol, ondansetron **OR** ondansetron, morphine, HYDROcodone 5 mg - acetaminophen    IV:        Intake/Output Summary (Last 24 hours) at 3/22/2021 0932  Last data filed at 3/21/2021 2200  Gross per 24 hour   Intake 760 ml   Output 1910 ml   Net -1150 ml       Results:  CBC:   Recent Labs     03/20/21  0446 03/21/21  0541 03/22/21 0449   WBC 9.4 8.7 8.9   HGB 11.9* 11.6* 12.1   HCT 35.3* 34.8* 36.6   MCV 86.6 86.3 86.0    223 248     BMP:   Recent Labs     03/20/21  0446 03/21/21  0541 03/22/21 0449    142 142   K 3.7 3.6 3.1*    105 101   CO2 28 28 30   BUN 16 15 14   CREATININE 0.6 0.7 0.7     Mag: No results for input(s): MAG in the last 72 hours. Phos:   Lab Results   Component Value Date    PHOS 2.6 03/18/2021     No components found for: GLU    LIVER PROFILE: No results for input(s): AST, ALT, LIPASE, BILIDIR, BILITOT, ALKPHOS in the last 72 hours. Invalid input(s): AMYLASE,  ALB  PT/INR: No results for input(s): PROTIME, INR in the last 72 hours. APTT: No results for input(s): APTT in the last 72 hours. UA:No results for input(s): NITRITE, COLORU, PHUR, LABCAST, WBCUA, RBCUA, MUCUS, TRICHOMONAS, YEAST, BACTERIA, CLARITYU, SPECGRAV, LEUKOCYTESUR, UROBILINOGEN, BILIRUBINUR, BLOODU, GLUCOSEU, AMORPHOUS in the last 72 hours. Invalid input(s): [place] input(s): ABG  Lab Results   Component Value Date    CALCIUM 8.6 03/22/2021    PHOS 2.6 03/18/2021       Assessment:    Active Problems:    Cancer of palate (HCC)    Status post dissection of neck  Resolved Problems:    * No resolved hospital problems. [facility] course: A 73 yo female, with Oropharyngeal squamous cell carcinoma, s/p excision of soft palate tumor by Dr Macrina Anderson. We were consulted for medical management of chd, cad, copd on 2L o2 at baseline, pvd. She recovered well in post op. Plan:    Oropharyngeal cancer  - s/p excision of tumor  - management, diet per primary     Hypokalemia  - replace PRN and recheck    bl freeman   CAD  - s/p pci 2016  - cont BB  - not on a statin?  restarted  - resume asa per ENT     PAF/aflutter    - currently in nsr  - continue bb, amio  -

## 2021-03-22 NOTE — PROGRESS NOTES
Pt discharged via wheelchair. Discharge instructions given. Iv and Telemetry removed. Pt has no complaints.

## 2021-03-22 NOTE — PROCEDURES
INSTRUMENTAL SWALLOW REPORT  MODIFIED BARIUM SWALLOW    NAME: Jaymie Shaver   : 1955  MRN: [id number]       Date of Eval: 3/22/2021     Ordering Physician: Dr. Madi Odonnell  Radiologist:       Medical Diagnosis: The encounter diagnosis was Oropharyngeal cancer ([facility] · HPI:65 y. o. 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  Surgery for excision of soft palate tumor and left selective neck dissection completed by Dr Madi Odonnell on 3/17. Taffy Chihuahua tube in place; advanced to full liquid diet and SLP consulted for swallow evaluation on 3/18. · 3/19 ENT note:I feel patient is overestimating how well she is doing.  Per speech she was having difficulty on bedside swallow.  This is expected as she has a large defect of her palate.  She really wants the NG tube out.  Will plan on a calorie count today.  Consider removal tomorrow.  Diet to stay liquids for now. Estimate patient will be inpatient until Tuesday or Wednesday.  Need to make sure diet is sufficient.  Often pain will actually increase day 3-4 which could limit oral intake. Onset : 3/17/2021  Referring Diagnosis(es):  Oropharyngeal Dysphagia   · Type of Study: Initial MBS    Past Medical History:  has a past medical history of Anxiety and depression, Asthma, Atrial fibrillation ([address] CAD (coronary artery disease), Cancer ([address] CHF (congestive heart failure) ([address] COPD (chronic obstructive pulmonary disease) ([address] Diabetes mellitus ([address] GERD (gastroesophageal reflux disease), Hyperlipidemia, Hypertension, Hypothyroidism, Morbid obesity with BMI of 40.0-44.9, adult ([address] On home O2, Sleep apnea, Thyroid disease, and Type 2 diabetes mellitus without complication ([address] Past Surgical History:  has a past surgical history that includes  section; sinus surgery; Tonsillectomy; Adenoidectomy; Coronary angioplasty with stent; Mouth surgery (N/A, 2021);  Nasal sinus surgery (N/A, 2021); laryngoscopy (N/A, 2021); eye surgery (Right); hernia repair; Hysterectomy; Finger trigger release (Bilateral); Mouth surgery (N/A, 3/17/2021); and Thyroidectomy (Left, 3/17/2021). Current Diet  Current Diet Solid Consistency: Dysphagia Pureed (Dysphagia I)  Current Diet Liquid Consistency: Thin    Chest XR: no recent chest XR since NG tube was removed      Patient Complaints/Reason for Referral:  Lexis Salinas was referred for a MBS to assess the efficiency of his/her swallow function, assess for aspiration, and to make recommendations regarding safe dietary consistencies, effective compensatory strategies, and safe eating environment. Assessment Impressions:  1. Behavior/Cognition/Vision/Hearing:  · Alert; Cooperative;Pleasant mood;Distractible. Pt is able to follows simple commands; needs reminders at times for compensatory swallow strategy (ie consistent carryover)). · Vision: Impaired: Blurry vision L eye; Hearing: Within functional limits  · Pain denies  · Pt is currently on room air   ·  Patient Position: Lateral;A-P and Patient Degrees: upright in Videofluoroscopic Chair   · Consistencies Administered: Dysphagia Pureed (Dysphagia I); Honey cup;Nectar cup;Nectar  teaspoon; Thin cup; Thin straw    2. Oropharyngeal Dysphagia characterized by episodic holding of material in mouth with variable lingual pumping for A-P oral initiation; reduced lingual coordination with variable premature loss of material to pharynx; delayed weak palatal trigger;  delayed initiation of swallow with reduced laryngeal elevation and reduced epiglottic inversion; reduced tongue base retraction; episodic pharyngeal retrograde flow to the .  Close monitor of puree with thin liquids  · Pt with pooling of material to the valleculae prior to the swallow  · Pt with pooling of thin liquids beyond the valleculae toward the pyriform sinus with episodes of penetration before/during the swallow (one episode deep) which appeared to clear  · Episodes of pharyngeal retrograde flow to the ; with 2 episodes of shallow retrograde flow to the nasal cavity beyond the  (most symptomatic with puree)  · Post swallow posterior lingual and valleculae residue which was pt was able to clear residue with 1-2 clearance swallows  · Pt with spontaneous cough to both retrograde flow and penetration  Close monitor with puree with thin liquids with clearance swallow. Upright for all meals. Recipes were provided. Recommend continue therapy while on acute medical flow. Pt will need continued therapy at d/c. Dysphagia Outcome Severity Scale: Level 3: Moderate dysphagia- Total assistance, supervision or strategies. Two or more diet consistencies restricted  Penetration-Aspiration Scale (PAS): 2 - Material enters the airway, remains above the vocal folds, and is ejected from the airway    Recommended Diet:  Solid consistency: Dysphagia Pureed (Dysphagia I)  Liquid consistency: Thin  Medication administration: Meds in puree(crush if able OR liquid form)    Safe Swallow Protocol:  Compensatory Swallowing Strategies: Upright as possible for all oral intake;Small bites/sips;Swallow 2 times per bite/sip; Remain upright for 30-45 minutes after meals  Go slowly; monitor for any nasal reflux    Recommendations/Treatment  Requires SLP Intervention: Yes   Duration/ Frequency: Continue daily therapy 3-5 times while on acute medical floor  D/C Recommendations: Home ST    Recommended Exercises:    Therapeutic Interventions: Diet tolerance monitoring;Patient/Family education; Bolus control exercises;Oral motor exercises; Laryngeal exercises    Education: Images and recommendations were reviewed with pt and pt's RN following this exam.   Patient Education Response: Verbalizes understanding;Needs reinforcement    Prognosis  Prognosis for safe diet advancement: good  Safety Devices  Safety Devices in place: Yes  Type of devices:  All fall risk precautions in place    Oral Preparation / Oral Phase  Oral Phase: Impaired  Oral Phase - Major Contributing Deficits  Poor Mastication: (DNT)  Weak Lingual Manipulation: (disorganized but functional)  Reduced Posterior Propulsion: (disorganized but functional)  Reduced Bolus Control: (noted)  Lingual / Palatal Residue: (noted across consistencies)  Premature Bolus Loss to Pharynx: (inconsistent for liquids)  Decreased Velopharyngeal Closure: (noted)  Delayed Trigger of Palatal Elevation: (inconsistent liquids)  Nasal Reflux: (pt has retrograde flow to  level with 2 episodes of retrograde flow to the nasal cavity most symptomatic post swallow of puree)  Reduced Tongue Base Retraction: (most symptomatic for puree)    Pharyngeal Phase  Pharyngeal Phase: Impaired  Pharyngeal Phase - Major Contributing Deficits  Delayed Swallow Initiation: All  Premature Spillage to Valleculae: All  Reduced Pharyngeal Peristalsis: (variable retrograde to level of )  Reduced Epiglottic Distention: (inconsistent)  Reduced Laryngeal Elevation: All  Pooling Valleculae: All  Reduced Tongue Base: (noted to be most symptomatic for puree)  Shallow Penetration Before: Thin straw; Thin cup  Shallow Penetration During: Thin cup; Thin straw  Complete Self-clearing (shallow): Thin straw;Ice chips  Deep Penetration Before: (x 1 thin liquid)  Deep Penetration During: (x 1 thin liquid)  Complete Retrieval (deep): (x 1 thin liquid)  Pharyngeal Residue - Valleculae: All      Esophageal Phase  Esophageal Screen: (DNT)      Therapy Time:   Individual   Time In 1435   Time Out 1505   Minutes 30           Signed  Erika Joyce,MS,CCC,SLP [id number]  Speech and Language Pathologist  3/22/2021, 5:03 PM

## 2021-04-02 DIAGNOSIS — I10 ESSENTIAL HYPERTENSION: ICD-10-CM

## 2021-04-02 DIAGNOSIS — I25.10 CORONARY ARTERY DISEASE INVOLVING NATIVE CORONARY ARTERY OF NATIVE HEART WITHOUT ANGINA PECTORIS: ICD-10-CM

## 2021-04-02 RX ORDER — CARVEDILOL 12.5 MG/1
12.5 TABLET ORAL 2 TIMES DAILY WITH MEALS
Qty: 60 TABLET | Refills: 1 | Status: SHIPPED | OUTPATIENT
Start: 2021-04-02 | End: 2021-07-08

## 2021-04-02 RX ORDER — AMIODARONE HYDROCHLORIDE 200 MG/1
200 TABLET ORAL DAILY
Qty: 30 TABLET | Refills: 1 | Status: SHIPPED | OUTPATIENT
Start: 2021-04-02 | End: 2021-07-08

## 2021-04-02 NOTE — TELEPHONE ENCOUNTER
Last OV: 8/12/2020  Next OV: none  Labs: 2/23/2021 TSH,3/22/2021 BMP  Last EKG (if needed):2/22/2021

## 2021-04-05 ENCOUNTER — OFFICE VISIT (OUTPATIENT)
Dept: ENT CLINIC | Age: 66
End: 2021-04-05

## 2021-04-05 VITALS — WEIGHT: 222 LBS | TEMPERATURE: 97.7 F | BODY MASS INDEX: 44.76 KG/M2 | HEIGHT: 59 IN

## 2021-04-05 DIAGNOSIS — C10.9 OROPHARYNGEAL CANCER (HCC): Primary | ICD-10-CM

## 2021-04-05 PROCEDURE — 1111F DSCHRG MED/CURRENT MED MERGE: CPT | Performed by: OTOLARYNGOLOGY

## 2021-04-05 PROCEDURE — 1090F PRES/ABSN URINE INCON ASSESS: CPT | Performed by: OTOLARYNGOLOGY

## 2021-04-05 PROCEDURE — 3017F COLORECTAL CA SCREEN DOC REV: CPT | Performed by: OTOLARYNGOLOGY

## 2021-04-05 PROCEDURE — G8427 DOCREV CUR MEDS BY ELIG CLIN: HCPCS | Performed by: OTOLARYNGOLOGY

## 2021-04-05 PROCEDURE — 4040F PNEUMOC VAC/ADMIN/RCVD: CPT | Performed by: OTOLARYNGOLOGY

## 2021-04-05 PROCEDURE — 1123F ACP DISCUSS/DSCN MKR DOCD: CPT | Performed by: OTOLARYNGOLOGY

## 2021-04-05 PROCEDURE — 99024 POSTOP FOLLOW-UP VISIT: CPT | Performed by: OTOLARYNGOLOGY

## 2021-04-05 PROCEDURE — G8417 CALC BMI ABV UP PARAM F/U: HCPCS | Performed by: OTOLARYNGOLOGY

## 2021-04-05 PROCEDURE — G8400 PT W/DXA NO RESULTS DOC: HCPCS | Performed by: OTOLARYNGOLOGY

## 2021-04-05 PROCEDURE — 1036F TOBACCO NON-USER: CPT | Performed by: OTOLARYNGOLOGY

## 2021-04-05 NOTE — PROGRESS NOTES
[address] [facility] SURGERY  Follow up      Patient Name: Elvis [facility] Record Number:  <[id number]  Primary Care Physician:  TERESA Estrada NP  Date of Consultation: 4/5/2021    Chief Complaint: Oropharyngeal cancer        Interval History  Patient is following up from the resection of the left posterior palate squamous cell carcinoma with a left neck dissection that I performed on 3-. The patient says that she is actually doing quite well. She does note that her left shoulder hurts a little bit at times. She says that she is eating okay and not losing weight. She does not have a lot of nasal regurgitation of food or water. REVIEW OF SYSTEMS  As above    PHYSICAL EXAM  GENERAL: No Acute Distress, Alert and Oriented, no Hoarseness, strong voice  EYES: EOMI, Anti-icteric  HENT:   Head: Normocephalic and atraumatic. Face:  Symmetric, facial nerve intact, no sinus tenderness  Right Ear: Normal external ear, normal external auditory canal, intact tympanic membrane with normal mobility and aerated middle ear  Left Ear: Normal external ear, normal external auditory canal, intact tympanic membrane with normal mobility and aerated middle ear  Oral exam-the patient's left oropharynx is granulating nicely. I do not see any evidence of recent bleeding. Neck-the left neck incision is healing well. There is a little bit of erythema, but no evidence of infection    Patient does seem to have a little bit of shoulder weakness on the left side      ASSESSMENT/PLAN  1. Oropharyngeal cancer (HCC)  T2 N0 M0 squamous cell carcinoma of the left posterior palate. Patient seems to be doing very well. Given that we had negative margins and none the 54 lymph nodes that were removed had cancer, we have opted to do a primary treatment with just surgery. She needs to have frequent surveillance for her cancer. She plans on moving to [facility].   She is going to do this in May. I would like to see her one last time prior to moving. 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 Patient does have a little shoulder weakness, but it seems to still be working. Expect this to continue to improve. I have performed a head and neck physical exam personally or was physically present during the key or critical portions of the service. This note was generated completely or in part utilizing Dragon dictation speech recognition software. Occasionally, words are mistranscribed and despite editing, the text may contain inaccuracies due to incorrect word recognition. If further clarification is needed please contact the office at [phone].

## 2021-04-27 ENCOUNTER — TELEPHONE (OUTPATIENT)
Dept: CARDIOLOGY CLINIC | Age: 66
End: 2021-04-27

## 2021-04-27 NOTE — TELEPHONE ENCOUNTER
Marisela Ruddy called in this morning wanting to speak with someone about her medications. She needs to know what she should and shouldn't be taking.      You can reach Mariselakassandra Fox at [phone]

## 2021-04-28 NOTE — TELEPHONE ENCOUNTER
Called patient and she says that her questions have been answered by someone else. I also asked her if she wanted me to make her an appointment with Dr Myles Sutton as she is overdue. She says that she will call back when she is ready to reschedule.

## 2021-05-04 RX ORDER — NITROGLYCERIN 0.4 MG/1
TABLET SUBLINGUAL
Qty: 50 TABLET | Refills: 1 | Status: SHIPPED | OUTPATIENT
Start: 2021-05-04

## 2021-07-07 DIAGNOSIS — I10 ESSENTIAL HYPERTENSION: ICD-10-CM

## 2021-07-07 DIAGNOSIS — I25.10 CORONARY ARTERY DISEASE INVOLVING NATIVE CORONARY ARTERY OF NATIVE HEART WITHOUT ANGINA PECTORIS: ICD-10-CM

## 2021-07-08 RX ORDER — AMIODARONE HYDROCHLORIDE 200 MG/1
TABLET ORAL
Qty: 30 TABLET | Refills: 0 | Status: SHIPPED | OUTPATIENT
Start: 2021-07-08 | End: 2021-07-26

## 2021-07-08 RX ORDER — CARVEDILOL 12.5 MG/1
TABLET ORAL
Qty: 60 TABLET | Refills: 0 | Status: SHIPPED | OUTPATIENT
Start: 2021-07-08 | End: 2021-07-26

## 2021-07-08 NOTE — TELEPHONE ENCOUNTER
Last ov 8/12/20  Pending appt overdue for apt  Last refill both meds 4/2/21 1 month x1  Last labs 3/22/21      My chart message sent to pt of needing an apt

## 2021-07-26 DIAGNOSIS — I10 ESSENTIAL HYPERTENSION: ICD-10-CM

## 2021-07-26 DIAGNOSIS — I25.10 CORONARY ARTERY DISEASE INVOLVING NATIVE CORONARY ARTERY OF NATIVE HEART WITHOUT ANGINA PECTORIS: ICD-10-CM

## 2021-07-26 RX ORDER — CARVEDILOL 12.5 MG/1
TABLET ORAL
Qty: 60 TABLET | Refills: 0 | Status: SHIPPED | OUTPATIENT
Start: 2021-07-26

## 2021-07-26 RX ORDER — AMLODIPINE BESYLATE 5 MG/1
TABLET ORAL
Qty: 30 TABLET | Refills: 5 | OUTPATIENT
Start: 2021-07-26

## 2021-07-26 RX ORDER — AMIODARONE HYDROCHLORIDE 200 MG/1
TABLET ORAL
Qty: 30 TABLET | Refills: 0 | Status: SHIPPED | OUTPATIENT
Start: 2021-07-26

## 2021-07-26 NOTE — TELEPHONE ENCOUNTER
Last OV: 8/12/2020  Next OV: none  Last refill:7/8/2021  Most recent Labs: 2/23/2021  Last PT/INR (if needed):  Last EKG (if needed):2/22/2021

## 2021-09-22 ENCOUNTER — TELEPHONE (OUTPATIENT)
Dept: ENT CLINIC | Age: 66
End: 2021-09-22

## 2021-09-22 NOTE — TELEPHONE ENCOUNTER
Pt calling to give Dr Colin Wilburn a message; she says she knows he was getting  and she wants to tell him congratulations. She also wants him to know she appreciates everything he has done for her and to let him know she is doing well.

## 2025-04-25 NOTE — PROGRESS NOTES
4 Eyes Skin Assessment     NAME:  Gricelda Lopes  YOB: 1955  MEDICAL RECORD NUMBER:  [id number]    The patient is being assess for  Admission    I agree that 2 RN's have performed a thorough Head to Toe Skin Assessment on the patient. ALL assessment sites listed below have been assessed. Areas assessed by both nurses:    Head, Face, Ears, Shoulders, Back, Chest, Arms, Elbows, Hands, Sacrum. Buttock, Coccyx, Ischium and Legs. Feet and Heels        Does the Patient have a Wound?  Other surgical incisions       Naresh Prevention initiated:  Yes   Wound Care Orders initiated:  Yes    Pressure Injury (Stage 3,4, Unstageable, DTI, NWPT, and Complex wounds) if present place consult order under [de-identified] NA    New and Established Ostomies if present place consult order under : NA      Nurse 1 eSignature: Electronically signed by Desiree Downey RN on 3/17/21 at 7:00 PM EDT    **SHARE this note so that the co-signing nurse is able to place an eSignature**    Nurse 2 eSignature: Electronically signed by Layla Mcfarland RN on 3/17/21 at 7:00 PM EDT [] : no respiratory distress [Respiration, Rhythm And Depth] : normal respiratory rhythm and effort [Exaggerated Use Of Accessory Muscles For Inspiration] : no accessory muscle use [Heart Rate And Rhythm] : heart rate was normal and rhythm regular [Examination Of The Chest] : the chest was normal in appearance [Chest Visual Inspection Thoracic Asymmetry] : no chest asymmetry [Diminished Respiratory Excursion] : normal chest expansion [2+] : left 2+ [Bowel Sounds] : normal bowel sounds [Abdomen Soft] : soft [Abdomen Tenderness] : non-tender [Involuntary Movements] : no involuntary movements were seen [Skin Color & Pigmentation] : normal skin color and pigmentation [Oriented To Time, Place, And Person] : oriented to person, place, and time [FreeTextEntry1] : + Expiratory wheeze in all lobes

## 2025-07-04 NOTE — PROGRESS NOTES
Physical Therapy    Visit Type: initial evaluation  SUBJECTIVE  Pt notes limited ambulator at baseline with walker since at least April per notes and pt reporting \" 5 years\"  Pt prefers to go by name Xavier  Pt responding consistently to questions and cues but initially performing most tasks with eyes closed.  Requested eyes open for more alertness to strength testing with pt complying   Patient / Family Goal: maximize function Get stronger    Pain   Patient denies pain.    At onset of session (out of 10): 0     OBJECTIVE     Cognitive Status   Orientation    - Oriented to: person, place and situation  Following Direction   - follows one step commands with repetition and follows multistep commands with repetition  Awareness of Deficits   - assistance required to compensate for deficits    Observation   Stands with bilateral knees flexed and hips extended.     Strength  (out of 5 unless noted, standard test position unless noted)   Hip:    - Flexion:         Left: 4+         Right: 4+  Knee:    - Flexion:         Left: 4         Right: 4    - Extension:         Left: 5         Right: 4+  Ankle:    - Dorsiflexion:         Left: 5         Right: 5      Standing Balance  (MARCELO = base of support)  Firm Surface: Double Leg      - Static, Eyes Open       - Trial 1 (sec): 30       - Trial 1 details: supervision      Coordination  LLE:     - Rapid Alternating Movement Test: toe tapping: intact  RLE:     - Rapid Alternating Movement Test: toe tapping: intact      Transfers  Assistive devices: gait belt, 2-wheeled walker, non-mechanical sit to stand lift  - Sit to stand: contact guard/touching/steadying assist, supervision, moderate assist  - Stand to sit: contact guard/touching/steadying assist, supervision, moderate assist  Initial transfer with gray steady.  Safe hand placement an sequencing, rising well with combination of UE and LE strength. Once upright tendency toward knee flexed position.  Able to perform lateral weight  3/19/2021 0815  Gross per 24 hour   Intake 1137.13 ml   Output 2152 ml   Net -1014.87 ml       Results:  CBC:   Recent Labs     03/18/21  0630   WBC 12.8*   HGB 12.0   HCT 36.6   MCV 86.5        BMP:   Recent Labs     03/18/21  0630      K 4.2      CO2 26   PHOS 2.6   BUN 17   CREATININE 0.6     Mag: No results for input(s): MAG in the last 72 hours. Phos:   Lab Results   Component Value Date    PHOS 2.6 03/18/2021     No components found for: GLU    LIVER PROFILE: No results for input(s): AST, ALT, LIPASE, BILIDIR, BILITOT, ALKPHOS in the last 72 hours. Invalid input(s): AMYLASE,  ALB  PT/INR: No results for input(s): PROTIME, INR in the last 72 hours. APTT: No results for input(s): APTT in the last 72 hours. UA:No results for input(s): NITRITE, COLORU, PHUR, LABCAST, WBCUA, RBCUA, MUCUS, TRICHOMONAS, YEAST, BACTERIA, CLARITYU, SPECGRAV, LEUKOCYTESUR, UROBILINOGEN, BILIRUBINUR, BLOODU, GLUCOSEU, AMORPHOUS in the last 72 hours. Invalid input(s): Milli Dominguez input(s): ABG  Lab Results   Component Value Date    CALCIUM 8.0 (L) 03/18/2021    PHOS 2.6 03/18/2021       Assessment:    Active Problems:    Cancer of palate (HCC)    Status post dissection of neck  Resolved Problems:    * No resolved hospital problems. [facility] course: A 73 yo female, with Oropharyngeal squamous cell carcinoma, s/p excision of soft palate tumor by Dr Dwayne Seo. We were consulted for medical management of chd, cad, copd on 2L o2 at baseline, pvd. She recovered well in post op. Plan:    Leukocytosis - improving  - likely reactive to surgery  - she is afebrile, respiratory status improving  - on unasyn     Oropharyngeal cancer  - s/p excision of tumor  - management, diet per primary     bl freeman   CAD  - s/p pci 2016  - cont BB  - not on a statin?  restarted  - resume asa per ENT     PAF/aflutter    - currently in nsr  - continue bb, amio  - restart eliquis per ENT     Copd  - stable  - cont home shifting, heel raises, mini squats, and hip hinging x 10 before returning to seated with hands on for safety  Second and third transfer with walker,  more posterior lean noted with poor LE utilization noted bilaterally and overall transfer posture compared to when using gray steady.  Once upright similar standing posture with knees flexed and hips at neutral.  Performed heel raises and weight shifting with walker then seated rest.  Last transfer with greater ease on third attempt to rise but up to mod assist on 2 failed transfer attempts.      Ambulation / Gait  - Assistive device: gait belt and 2-wheeled walker  - Distance (feet unless otherwise indicated): 4  - Assist Level: minimal assist    Semi suffling gait with lesser stance time right than left,  safe use of walker and UE to support right knee.  Continuous cues for greater knee extension right to maximize stability in single limb stance.  Chair follow for safety              Education:   - Present and ready to learn: patient  Education provided during session:  - transfer technique, gait training, role of PT, bed mobility techniques, proper body mechanics and safety  - Results of above outlined education: Verbalizes understanding, Demonstrates understanding and Needs reinforcement    ASSESSMENT   Patient will benefit from skilled therapy to address listed impairments and functional limitations.  Interfering components: decreased activity tolerance and decreased insight into deficit    Discharge needs based on today's assessment:   - Current level of function: significantly below baseline level of function   - Therapy needs at discharge: therapy 5 or more times per week   - Activities of daily living (ADLs) requiring support at discharge: bed mobility, transfers, ambulation, stairs, toileting, dressing and personal hygiene   - Instrumental activities of daily living (IADLs) requiring support at discharge: shopping, home management, meal preparation, emergency  inhalers     H/o Ischemic cardiomyopathy  - echo 2/2021, lvef 09%, normal diastolic function   - cont coreg  - restarted lasix   - stopped ivf today      htn  - bp stable  - cont coreg with hold parameters    Diabetes  - stable  - cont metformin   - SSI, accuchecks      Code status:  full  DVT prophylaxis: [x] Lovenox  [] SQ Heparin  [] SCDs because of  [] warfarin/oral direct thrombin inhibitor [] Encourage ambulation      Disposition:  [] Home [] Rehab [] Psych [] SNF  [] LTAC  [] Transfer to ICU  [] Transfer to PCU [] Other:  Can transfer to pcu per primary     Electronically signed by Celine Duncan DO on 3/19/2021 at 10:09 AM responses, health/medication management and community mobility   - Impairments that require further therapy intervention: activity tolerance, ROM, strength, safety awareness, balance and motor planning    AM-PAC  - Generalized Prior Level of Function: Needs a little help ([facility] 12-21)       Key: MOD A=moderate assistance, IND/MOD I=independent/modified independent  - Generalized Current Level of Function     - Current Mobility Score: 14       AM-PAC Scoring Key= >21 Modified Independent; 20-21 Supervision; 18-19 Minimal assist; 13-17 Moderate assist; 9-12 Max assist; <9 Total assist        Predicted patient presentation: Low (stable) - Patient comorbidities and complexities, as defined above, will have little effect on progress for prescribed plan of care.    PLAN (while hospitalized)  Suggestions for next session as indicated: Ambulation with chair follow.  Marching alternating in place focusing on terminal knee extension on right stance limb.  Transfers for reps for strength and form  PT Frequency: 3-5 x per week      PT/OT Mobility Equipment for Discharge: walker at home and in room     Interventions: bed mobility, gait training, strengthening, ROM, neuromuscular re-education, safety education, stairs retraining and functional transfer training  Agreement to plan and goals: patient agrees with goals and treatment plan        GOALS  Long Term Goals: (to be met by time of discharge from hospital)  Sit to stand: Patient will complete sit to stand transfer with 2-wheeled walker, supervision.   Ambulation (even): Patient will ambulate on even surface for 150 feet with gait belt and 2-wheeled walker, supervision.   Documented in the chart in the following areas:  Prior Function. Assessment/Plan.       Patient at End of Session:   Location: in chair  Safety measures: alarm system in place/re-engaged  Handoff to: nurse        Therapy procedure time and total treatment time can be found documented on the Time Entry  flowsheet

## (undated) DEVICE — COVER LT HNDL BLU PLAS

## (undated) DEVICE — ENT I-LF: Brand: MEDLINE INDUSTRIES, INC.

## (undated) DEVICE — Z DISCONTINUED BY MEDLINE USE 2711682 TRAY SKIN PREP DRY W/ PREM GLV

## (undated) DEVICE — SUTURE PERMAHAND SZ 2-0 L12X18IN NONABSORBABLE BLK SILK A185H

## (undated) DEVICE — 3M™ TEGADERM™ TRANSPARENT FILM DRESSING FRAME STYLE, 1624W, 2-3/8 IN X 2-3/4 IN (6 CM X 7 CM), 100/CT 4CT/CASE: Brand: 3M™ TEGADERM™

## (undated) DEVICE — SOLUTION IV IRRIG 250ML ST LF 0.9% SODIUM 2F7122

## (undated) DEVICE — BLADE ES ELASTOMERIC COAT INSUL DURABLE BEND UPTO 90DEG

## (undated) DEVICE — GLOVE ORANGE PI 7 1/2   MSG9075

## (undated) DEVICE — MERCY HEALTH WEST TURNOVER: Brand: MEDLINE INDUSTRIES, INC.

## (undated) DEVICE — STAPLER SKIN H3.9MM WIRE DIA0.58MM CRWN 6.9MM 35 STPL ROT

## (undated) DEVICE — APPLIER CLP L9.375IN APER 2.1MM CLS L3.8MM 20 SM TI CLP

## (undated) DEVICE — KIT,ANTI FOG,W/SPONGE & FLUID,SOFT PACK: Brand: MEDLINE

## (undated) DEVICE — SPONGE LAP W18XL18IN WHT COT 4 PLY FLD STRUNG RADPQ DISP ST

## (undated) DEVICE — MASTISOL ADHESIVE LIQ 2/3ML

## (undated) DEVICE — COAGULATOR SUCT 8FR L6IN HNDL FOR ENT PROC

## (undated) DEVICE — DRAIN SURG W10MMXL20CM SIL FLAT HUBLESS W/ RADPQ STRP 3/4

## (undated) DEVICE — POSITIONER,HEAD,RING CUSHION,9IN,32CS: Brand: MEDLINE

## (undated) DEVICE — PENCIL ES L3M BTTN SWCH S STL HEX LOK BLDE ELECTRD HOLSTER

## (undated) DEVICE — NEEDLE HYPO 27GA L15IN REG BVL W O SFTY FOR SYR DISPOSABLE

## (undated) DEVICE — HOOK RETRCT L5MM E SHRP SELF RET SYS LONE STAR

## (undated) DEVICE — SYRINGE MED 10ML LUERLOCK TIP W/O SFTY DISP

## (undated) DEVICE — SUTURE VCRL SZ 4-0 L18IN ABSRB UD L19MM PS-2 3/8 CIR PRIM J496H

## (undated) DEVICE — SYRINGE MED 30ML STD CLR PLAS LUERLOCK TIP N CTRL DISP

## (undated) DEVICE — GOWN SIRUS NONREIN XL W/TWL: Brand: MEDLINE INDUSTRIES, INC.

## (undated) DEVICE — GAUZE FLUFF 2 PLY: Brand: DEROYAL

## (undated) DEVICE — DRAPE,SPLIT ,77X120: Brand: MEDLINE

## (undated) DEVICE — DRAPE,INSTRUMENT,MAGNETIC,10X16: Brand: MEDLINE

## (undated) DEVICE — TONSIL SPONGES: Brand: DEROYAL

## (undated) DEVICE — TOWEL,OR,DSP,ST,BLUE,STD,4/PK,20PK/CS: Brand: MEDLINE

## (undated) DEVICE — LOOP,VESSEL,MAXI,BLUE,2/PK,STERILE: Brand: MEDLINE

## (undated) DEVICE — SUTURE PROL SZ 5-0 L36IN NONABSORBABLE BLU L13MM C-1 3/8 8720H

## (undated) DEVICE — GAUZE,SPONGE,4"X4",16PLY,XRAY,STRL,LF: Brand: MEDLINE

## (undated) DEVICE — TUBING SUCT 10FR MAL ALUM SHFT FN CAP VENT UNIV CONN W/ OBT

## (undated) DEVICE — STRIP,CLOSURE,WOUND,MEDI-STRIP,1/2X4: Brand: MEDLINE

## (undated) DEVICE — TAPE MED W1/8XL30IN WHT POLY

## (undated) DEVICE — FLOSEAL HEMOSTATIC MATRIX, 5ML: Brand: FLOSEAL HEMOSTATIC MATRIX

## (undated) DEVICE — 4-PORT MANIFOLD: Brand: NEPTUNE 2

## (undated) DEVICE — SYRINGE,EAR/ULCER, 2 OZ, STERILE: Brand: MEDLINE

## (undated) DEVICE — SYRINGE MED 10ML TRNSLUC BRL PLUNG BLK MRK POLYPR CTRL

## (undated) DEVICE — STERILE SURGICAL LUBRICANT, METAL TUBE: Brand: SURGILUBE

## (undated) DEVICE — DRAPE THYROID

## (undated) DEVICE — MINOR SET UP PK

## (undated) DEVICE — ELECTRODE PT RET AD L9FT HI MOIST COND ADH HYDRGEL CORDED

## (undated) DEVICE — CODMAN® SURGICAL PATTIES 1/2" X 1/2" (1.27CM X 1.27CM): Brand: CODMAN®

## (undated) DEVICE — FIRM 8CM: Brand: NASOPORE

## (undated) DEVICE — TUBING, SUCTION, 1/4" X 12', STRAIGHT: Brand: MEDLINE

## (undated) DEVICE — DRAIN WND SIL FLAT RADIOPAQUE 10MM FULL FLUTED

## (undated) DEVICE — TOTAL TRAY, DB, 100% SILI FOLEY, 16FR 10: Brand: MEDLINE

## (undated) DEVICE — APPLICATOR MEDICATED 26 CC SOLUTION HI LT ORNG CHLORAPREP

## (undated) DEVICE — DISSECTOR ENDOSCP L21CM TIP CURVATURE 40DEG FN CRV JAW VES

## (undated) DEVICE — SPONGE,NEURO,1"X3",XR,STRL,LF,10/PK: Brand: MEDLINE

## (undated) DEVICE — SEALANT TISS FIBRIN TISSEEL 4ML

## (undated) DEVICE — SUTURE PERMA-HAND SZ 2-0 L30IN NONABSORBABLE BLK L26MM SH K833H

## (undated) DEVICE — SUTURE PROL SZ 5-0 L18IN NONABSORBABLE BLU L13MM P-3 3/8 8698G

## (undated) DEVICE — SOLUTION SCRB 4OZ 10% POVIDONE IOD ANTIMIC BTL

## (undated) DEVICE — RETRACTOR SURGICAL 3302G

## (undated) DEVICE — Z DISCONTINUED USE 2271144 DRAIN SURG W0.25XL18IN PRECUT UNIF WALL THICKNESS PENROSE

## (undated) DEVICE — SUTURE PROL SZ 6-0 L18IN NONABSORBABLE BLU L36MM P-1 3/8 8697G

## (undated) DEVICE — SUTURE VCRL SZ 3-0 L18IN ABSRB UD L26MM SH 1/2 CIR J864D

## (undated) DEVICE — EMG TUBE 8229707 NIM TRIVANTAGE 7.0MM ID: Brand: NIM TRIVANTAGE™

## (undated) DEVICE — SYRINGE IRRIG 60ML SFT PLIABLE BLB EZ TO GRP 1 HND USE W/

## (undated) DEVICE — SUTURE ETHLN SZ 4-0 L18IN NONABSORBABLE BLK L19MM FS-2 3/8 662G

## (undated) DEVICE — INTENDED FOR TISSUE SEPARATION, AND OTHER PROCEDURES THAT REQUIRE A SHARP SURGICAL BLADE TO PUNCTURE OR CUT.: Brand: BARD-PARKER ® STAINLESS STEEL BLADES

## (undated) DEVICE — SPONGE,DISSECTOR,K,XRAY,9/16"X1/4",STRL: Brand: MEDLINE

## (undated) DEVICE — SOLUTION PREP POVIDONE IOD FOR SKIN MUCOUS MEM PRIOR TO

## (undated) DEVICE — APPLIER LIG CLP M L11IN TI STR RNG HNDL FOR 20 CLP DISP

## (undated) DEVICE — SOLUTION IV IRRIG POUR BRL 0.9% SODIUM CHL 2F7124

## (undated) DEVICE — PAD,NON-ADHERENT,3X8,STERILE,LF,1/PK: Brand: MEDLINE

## (undated) DEVICE — RETRACTOR REPROC RNG SCOTT 18.3X28.3 CM SELF RET NORYL RESIN PLAS

## (undated) DEVICE — PROBE 8225825 3PK INCREMT STD PRASS ROHS

## (undated) DEVICE — SKIN MARKER REGULAR TIP WITH RULER CAP AND LABELS: Brand: DEVON

## (undated) DEVICE — GUARD TOOTH SM

## (undated) DEVICE — Device

## (undated) DEVICE — SUTURE PERMAHAND SZ 3-0 L30IN NONABSORBABLE BLK SH L26MM K832H

## (undated) DEVICE — RESERVOIR,SUCTION,100CC,SILICONE: Brand: MEDLINE